# Patient Record
Sex: FEMALE | Race: BLACK OR AFRICAN AMERICAN | Employment: OTHER | URBAN - METROPOLITAN AREA
[De-identification: names, ages, dates, MRNs, and addresses within clinical notes are randomized per-mention and may not be internally consistent; named-entity substitution may affect disease eponyms.]

---

## 2017-04-21 ENCOUNTER — APPOINTMENT (OUTPATIENT)
Dept: GENERAL RADIOLOGY | Age: 82
DRG: 871 | End: 2017-04-21
Attending: STUDENT IN AN ORGANIZED HEALTH CARE EDUCATION/TRAINING PROGRAM
Payer: COMMERCIAL

## 2017-04-21 ENCOUNTER — HOSPITAL ENCOUNTER (INPATIENT)
Age: 82
LOS: 6 days | Discharge: NURSING, REHAB OR LONG-TERM CARE | DRG: 871 | End: 2017-04-28
Attending: STUDENT IN AN ORGANIZED HEALTH CARE EDUCATION/TRAINING PROGRAM | Admitting: INTERNAL MEDICINE
Payer: COMMERCIAL

## 2017-04-21 ENCOUNTER — APPOINTMENT (OUTPATIENT)
Dept: ULTRASOUND IMAGING | Age: 82
DRG: 871 | End: 2017-04-21
Attending: STUDENT IN AN ORGANIZED HEALTH CARE EDUCATION/TRAINING PROGRAM
Payer: COMMERCIAL

## 2017-04-21 ENCOUNTER — APPOINTMENT (OUTPATIENT)
Dept: CT IMAGING | Age: 82
DRG: 871 | End: 2017-04-21
Attending: STUDENT IN AN ORGANIZED HEALTH CARE EDUCATION/TRAINING PROGRAM
Payer: COMMERCIAL

## 2017-04-21 DIAGNOSIS — Z71.89 GOALS OF CARE, COUNSELING/DISCUSSION: ICD-10-CM

## 2017-04-21 DIAGNOSIS — R53.81 DEBILITY: ICD-10-CM

## 2017-04-21 DIAGNOSIS — R06.02 SHORTNESS OF BREATH: ICD-10-CM

## 2017-04-21 DIAGNOSIS — K81.0 ACUTE CHOLECYSTITIS: ICD-10-CM

## 2017-04-21 DIAGNOSIS — R41.0 ACUTE DELIRIUM: ICD-10-CM

## 2017-04-21 DIAGNOSIS — R40.4 TRANSIENT ALTERATION OF AWARENESS: Primary | ICD-10-CM

## 2017-04-21 LAB
ALBUMIN SERPL BCP-MCNC: 3.7 G/DL (ref 3.5–5)
ALBUMIN/GLOB SERPL: 0.9 {RATIO} (ref 1.1–2.2)
ALP SERPL-CCNC: 112 U/L (ref 45–117)
ALT SERPL-CCNC: 25 U/L (ref 12–78)
AMORPH CRY URNS QL MICRO: ABNORMAL
AMPHET UR QL SCN: NEGATIVE
ANION GAP BLD CALC-SCNC: 10 MMOL/L (ref 5–15)
APPEARANCE UR: ABNORMAL
AST SERPL W P-5'-P-CCNC: 23 U/L (ref 15–37)
BACTERIA URNS QL MICRO: NEGATIVE /HPF
BARBITURATES UR QL SCN: NEGATIVE
BASOPHILS # BLD AUTO: 0 K/UL (ref 0–0.1)
BASOPHILS # BLD: 0 % (ref 0–1)
BENZODIAZ UR QL: NEGATIVE
BILIRUB SERPL-MCNC: 0.7 MG/DL (ref 0.2–1)
BILIRUB UR QL CFM: NEGATIVE
BUN SERPL-MCNC: 28 MG/DL (ref 6–20)
BUN/CREAT SERPL: 17 (ref 12–20)
CALCIUM SERPL-MCNC: 9.4 MG/DL (ref 8.5–10.1)
CANNABINOIDS UR QL SCN: NEGATIVE
CHLORIDE SERPL-SCNC: 101 MMOL/L (ref 97–108)
CO2 SERPL-SCNC: 25 MMOL/L (ref 21–32)
COCAINE UR QL SCN: NEGATIVE
COLOR UR: ABNORMAL
CREAT SERPL-MCNC: 1.67 MG/DL (ref 0.55–1.02)
DRUG SCRN COMMENT,DRGCM: NORMAL
EOSINOPHIL # BLD: 0 K/UL (ref 0–0.4)
EOSINOPHIL NFR BLD: 0 % (ref 0–7)
EPITH CASTS URNS QL MICRO: ABNORMAL /LPF
ERYTHROCYTE [DISTWIDTH] IN BLOOD BY AUTOMATED COUNT: 15.5 % (ref 11.5–14.5)
ETHANOL SERPL-MCNC: <10 MG/DL
GLOBULIN SER CALC-MCNC: 4 G/DL (ref 2–4)
GLUCOSE SERPL-MCNC: 178 MG/DL (ref 65–100)
GLUCOSE UR STRIP.AUTO-MCNC: NEGATIVE MG/DL
GRAN CASTS URNS QL MICRO: ABNORMAL /LPF
HCT VFR BLD AUTO: 34.4 % (ref 35–47)
HGB BLD-MCNC: 10.8 G/DL (ref 11.5–16)
HGB UR QL STRIP: NEGATIVE
HYALINE CASTS URNS QL MICRO: ABNORMAL /LPF (ref 0–5)
KETONES UR QL STRIP.AUTO: ABNORMAL MG/DL
LACTATE SERPL-SCNC: 1.7 MMOL/L (ref 0.4–2)
LACTATE SERPL-SCNC: 2.3 MMOL/L (ref 0.4–2)
LEUKOCYTE ESTERASE UR QL STRIP.AUTO: NEGATIVE
LYMPHOCYTES # BLD AUTO: 6 % (ref 12–49)
LYMPHOCYTES # BLD: 1.2 K/UL (ref 0.8–3.5)
MCH RBC QN AUTO: 25.4 PG (ref 26–34)
MCHC RBC AUTO-ENTMCNC: 31.4 G/DL (ref 30–36.5)
MCV RBC AUTO: 80.8 FL (ref 80–99)
METHADONE UR QL: NEGATIVE
MONOCYTES # BLD: 1.3 K/UL (ref 0–1)
MONOCYTES NFR BLD AUTO: 7 % (ref 5–13)
NEUTS SEG # BLD: 16.5 K/UL (ref 1.8–8)
NEUTS SEG NFR BLD AUTO: 87 % (ref 32–75)
NITRITE UR QL STRIP.AUTO: NEGATIVE
OPIATES UR QL: NEGATIVE
PCP UR QL: NEGATIVE
PH UR STRIP: 5 [PH] (ref 5–8)
PLATELET # BLD AUTO: 352 K/UL (ref 150–400)
POTASSIUM SERPL-SCNC: 4.4 MMOL/L (ref 3.5–5.1)
PROT SERPL-MCNC: 7.7 G/DL (ref 6.4–8.2)
PROT UR STRIP-MCNC: 300 MG/DL
RBC # BLD AUTO: 4.26 M/UL (ref 3.8–5.2)
RBC #/AREA URNS HPF: ABNORMAL /HPF (ref 0–5)
SODIUM SERPL-SCNC: 136 MMOL/L (ref 136–145)
SP GR UR REFRACTOMETRY: 1.02 (ref 1–1.03)
TROPONIN I SERPL-MCNC: <0.04 NG/ML
UROBILINOGEN UR QL STRIP.AUTO: 0.2 EU/DL (ref 0.2–1)
WBC # BLD AUTO: 19 K/UL (ref 3.6–11)
WBC URNS QL MICRO: ABNORMAL /HPF (ref 0–4)

## 2017-04-21 PROCEDURE — 80307 DRUG TEST PRSMV CHEM ANLYZR: CPT | Performed by: STUDENT IN AN ORGANIZED HEALTH CARE EDUCATION/TRAINING PROGRAM

## 2017-04-21 PROCEDURE — 83605 ASSAY OF LACTIC ACID: CPT | Performed by: STUDENT IN AN ORGANIZED HEALTH CARE EDUCATION/TRAINING PROGRAM

## 2017-04-21 PROCEDURE — 96366 THER/PROPH/DIAG IV INF ADDON: CPT

## 2017-04-21 PROCEDURE — 77030005563 HC CATH URETH INT MMGH -A

## 2017-04-21 PROCEDURE — 70450 CT HEAD/BRAIN W/O DYE: CPT

## 2017-04-21 PROCEDURE — 74011000250 HC RX REV CODE- 250: Performed by: STUDENT IN AN ORGANIZED HEALTH CARE EDUCATION/TRAINING PROGRAM

## 2017-04-21 PROCEDURE — 51701 INSERT BLADDER CATHETER: CPT

## 2017-04-21 PROCEDURE — 71010 XR CHEST PORT: CPT

## 2017-04-21 PROCEDURE — 93005 ELECTROCARDIOGRAM TRACING: CPT

## 2017-04-21 PROCEDURE — 80053 COMPREHEN METABOLIC PANEL: CPT | Performed by: STUDENT IN AN ORGANIZED HEALTH CARE EDUCATION/TRAINING PROGRAM

## 2017-04-21 PROCEDURE — 74176 CT ABD & PELVIS W/O CONTRAST: CPT

## 2017-04-21 PROCEDURE — 74011250636 HC RX REV CODE- 250/636: Performed by: STUDENT IN AN ORGANIZED HEALTH CARE EDUCATION/TRAINING PROGRAM

## 2017-04-21 PROCEDURE — 96361 HYDRATE IV INFUSION ADD-ON: CPT

## 2017-04-21 PROCEDURE — 96365 THER/PROPH/DIAG IV INF INIT: CPT

## 2017-04-21 PROCEDURE — 84484 ASSAY OF TROPONIN QUANT: CPT | Performed by: STUDENT IN AN ORGANIZED HEALTH CARE EDUCATION/TRAINING PROGRAM

## 2017-04-21 PROCEDURE — 85025 COMPLETE CBC W/AUTO DIFF WBC: CPT | Performed by: STUDENT IN AN ORGANIZED HEALTH CARE EDUCATION/TRAINING PROGRAM

## 2017-04-21 PROCEDURE — 81001 URINALYSIS AUTO W/SCOPE: CPT | Performed by: STUDENT IN AN ORGANIZED HEALTH CARE EDUCATION/TRAINING PROGRAM

## 2017-04-21 PROCEDURE — 36415 COLL VENOUS BLD VENIPUNCTURE: CPT | Performed by: STUDENT IN AN ORGANIZED HEALTH CARE EDUCATION/TRAINING PROGRAM

## 2017-04-21 PROCEDURE — 99285 EMERGENCY DEPT VISIT HI MDM: CPT

## 2017-04-21 PROCEDURE — 74011250636 HC RX REV CODE- 250/636

## 2017-04-21 PROCEDURE — 87040 BLOOD CULTURE FOR BACTERIA: CPT | Performed by: STUDENT IN AN ORGANIZED HEALTH CARE EDUCATION/TRAINING PROGRAM

## 2017-04-21 PROCEDURE — 76705 ECHO EXAM OF ABDOMEN: CPT

## 2017-04-21 PROCEDURE — 94640 AIRWAY INHALATION TREATMENT: CPT

## 2017-04-21 RX ORDER — LEVOFLOXACIN 5 MG/ML
750 INJECTION, SOLUTION INTRAVENOUS
Status: COMPLETED | OUTPATIENT
Start: 2017-04-21 | End: 2017-04-21

## 2017-04-21 RX ORDER — POLYVINYL ALCOHOL 14 MG/ML
2 SOLUTION/ DROPS OPHTHALMIC
COMMUNITY

## 2017-04-21 RX ORDER — SIMETHICONE 80 MG
160 TABLET,CHEWABLE ORAL
COMMUNITY
End: 2019-09-28

## 2017-04-21 RX ORDER — IPRATROPIUM BROMIDE AND ALBUTEROL SULFATE 2.5; .5 MG/3ML; MG/3ML
3 SOLUTION RESPIRATORY (INHALATION)
COMMUNITY
End: 2017-04-21

## 2017-04-21 RX ORDER — LISINOPRIL 40 MG/1
40 TABLET ORAL DAILY
COMMUNITY
End: 2019-09-28

## 2017-04-21 RX ORDER — GABAPENTIN 300 MG/1
300 CAPSULE ORAL
COMMUNITY

## 2017-04-21 RX ORDER — METRONIDAZOLE 500 MG/100ML
500 INJECTION, SOLUTION INTRAVENOUS
Status: COMPLETED | OUTPATIENT
Start: 2017-04-21 | End: 2017-04-21

## 2017-04-21 RX ORDER — ALUMINA, MAGNESIA, AND SIMETHICONE 2400; 2400; 240 MG/30ML; MG/30ML; MG/30ML
5 SUSPENSION ORAL
COMMUNITY
End: 2017-04-21

## 2017-04-21 RX ORDER — POLYETHYLENE GLYCOL 3350 17 G/17G
17 POWDER, FOR SOLUTION ORAL DAILY
COMMUNITY

## 2017-04-21 RX ORDER — DICLOFENAC SODIUM 10 MG/G
2 GEL TOPICAL
COMMUNITY
End: 2019-09-28

## 2017-04-21 RX ORDER — GUAIFENESIN 100 MG/5ML
200 SOLUTION ORAL
COMMUNITY
End: 2019-09-28

## 2017-04-21 RX ORDER — DONEPEZIL HYDROCHLORIDE 5 MG/1
5 TABLET, FILM COATED ORAL
COMMUNITY
End: 2019-09-28

## 2017-04-21 RX ORDER — GABAPENTIN 100 MG/1
200 CAPSULE ORAL
COMMUNITY
End: 2017-04-21 | Stop reason: DRUGHIGH

## 2017-04-21 RX ORDER — HYDROCODONE BITARTRATE AND ACETAMINOPHEN 5; 325 MG/1; MG/1
1 TABLET ORAL
COMMUNITY
End: 2017-04-28

## 2017-04-21 RX ORDER — IPRATROPIUM BROMIDE AND ALBUTEROL SULFATE 2.5; .5 MG/3ML; MG/3ML
3 SOLUTION RESPIRATORY (INHALATION)
Status: COMPLETED | OUTPATIENT
Start: 2017-04-21 | End: 2017-04-21

## 2017-04-21 RX ORDER — ERGOCALCIFEROL 1.25 MG/1
100000 CAPSULE ORAL
COMMUNITY
End: 2019-09-28

## 2017-04-21 RX ORDER — ASPIRIN 325 MG
100000 TABLET, DELAYED RELEASE (ENTERIC COATED) ORAL
COMMUNITY
End: 2017-04-21 | Stop reason: SDUPTHER

## 2017-04-21 RX ORDER — NALOXONE HYDROCHLORIDE 1 MG/ML
INJECTION INTRAMUSCULAR; INTRAVENOUS; SUBCUTANEOUS
Status: COMPLETED
Start: 2017-04-21 | End: 2017-04-21

## 2017-04-21 RX ORDER — LANOLIN ALCOHOL/MO/W.PET/CERES
400 CREAM (GRAM) TOPICAL DAILY
COMMUNITY

## 2017-04-21 RX ORDER — ONDANSETRON 4 MG/1
4 TABLET, FILM COATED ORAL
COMMUNITY
End: 2019-09-28

## 2017-04-21 RX ADMIN — IPRATROPIUM BROMIDE AND ALBUTEROL SULFATE 3 ML: .5; 3 SOLUTION RESPIRATORY (INHALATION) at 19:35

## 2017-04-21 RX ADMIN — METRONIDAZOLE 500 MG: 500 INJECTION, SOLUTION INTRAVENOUS at 21:59

## 2017-04-21 RX ADMIN — NALOXONE HYDROCHLORIDE 2 MG: 1 INJECTION PARENTERAL at 17:03

## 2017-04-21 RX ADMIN — LEVOFLOXACIN 750 MG: 5 INJECTION, SOLUTION INTRAVENOUS at 20:33

## 2017-04-21 RX ADMIN — SODIUM CHLORIDE 1000 ML: 900 INJECTION, SOLUTION INTRAVENOUS at 17:14

## 2017-04-21 NOTE — PROGRESS NOTES
Admission Medication Reconciliation:    Information obtained from:  [facility] from Erica Kline with last administrations noted    Comments/Recommendations: Updated PTA meds/reviewed patient's allergies. 1)  Medications/Last dose per Shila COLUNGA. Patient also had medication list from [facility] where she goes during the day however [facility] list does not accurately reflect the medications patient is receiving at facility. Significant PMH/Disease States:   Past Medical History:   Diagnosis Date    Asthma     [facility] [address]     [facility] failure ([facility] [address] [facility]     Hypertension     Stroke [facility])      Chief Complaint for this Admission: AMS    Allergies:  Aspirin; Isoniazid; Pcn [penicillins]; and Sulfa (sulfonamide antibiotics)    Prior to Admission Medications:   Prior to Admission Medications   Prescriptions Last Dose Informant Patient Reported? Taking? HYDROcodone-acetaminophen (NORCO) 5-325 mg per tablet 4/15/2017  Yes Yes   Sig: Take 1 Tab by mouth every six (6) hours as needed for Pain. acetaminophen (TYLENOL) 500 mg tablet   Yes Yes   Sig: Take 500 mg by mouth every eight (8) hours as needed for Pain. Patient received afternoon dose at [facility]   amLODIPine (NORVASC) 2.5 mg tablet 4/20/2017 at HS  Yes Yes   Sig: Take 2.5 mg by mouth nightly. camphor-menthol (SARNA ANTI-ITCH) 0.5-0.5 % lotion   Yes Yes   Sig: Apply  to affected area four (4) times daily as needed (neuropathich pain). citalopram (CELEXA) 20 mg tablet 4/21/2017 at AM  Yes Yes   Sig: Take 20 mg by mouth daily. clopidogrel (PLAVIX) 75 mg tablet 4/21/2017 at AM Transfer Papers Yes Yes   Sig: Take 75 mg by mouth daily. diclofenac (VOLTAREN) 1 % gel   Yes Yes   Sig: Apply 2 g to affected area four (4) times daily as needed (Knee and shoulder pain).    donepezil (ARICEPT) 5 mg tablet 4/20/2017 at HS  Yes Yes   Sig: Take 5 mg by mouth nightly.   ergocalciferol (ERGOCALCIFEROL) 50,000 unit capsule 4/6/2017 at AM  Yes Yes   Sig: Take 100,000 Units by mouth every month. furosemide (LASIX) 20 mg tablet 4/21/2017 at AM  Yes Yes   Sig: Take 20 mg by mouth every Monday, Wednesday, Friday. gabapentin (NEURONTIN) 300 mg capsule 4/20/2017 at HS  Yes Yes   Sig: Take 300 mg by mouth nightly. guaiFENesin (ROBITUSSIN) 100 mg/5 mL liquid   Yes Yes   Sig: Take 200 mg by mouth every four (4) hours as needed for Cough. hydrALAZINE (APRESOLINE) 100 mg tablet 4/21/2017 at 1200  Yes Yes   Sig: Take 50 mg by mouth three (3) times daily. levothyroxine (SYNTHROID) 100 mcg tablet 4/21/2017 at ACB  Yes Yes   Sig: Take 100 mcg by mouth Daily (before breakfast). lisinopril (PRINIVIL, ZESTRIL) 40 mg tablet 4/21/2017 at AM  Yes Yes   Sig: Take 40 mg by mouth daily. magnesium oxide (MAG-OX) 400 mg tablet 4/21/2017 at AM  Yes Yes   Sig: Take 400 mg by mouth daily. ondansetron hcl (ZOFRAN, AS HYDROCHLORIDE,) 4 mg tablet 4/21/2017 at 0840  Yes Yes   Sig: Take 4 mg by mouth every eight (8) hours as needed for Nausea. polyethylene glycol (MIRALAX) 17 gram packet 4/21/2017 at AM  Yes Yes   Sig: Take 17 g by mouth daily. Mix in 8 oz of water, juice, soda, coffee, or tea prior to administration   polyvinyl alcohol (LIQUIFILM TEARS) 1.4 % ophthalmic solution 4/21/2017 at AM  Yes Yes   Sig: Administer 2 Drops to both eyes ACB/HS. simethicone (MYLICON) 80 mg chewable tablet   Yes Yes   Sig: Take 160 mg by mouth every eight (8) hours as needed for Flatulence.       Facility-Administered Medications: None

## 2017-04-21 NOTE — ED TRIAGE NOTES
Pt lives at [facility]. Pt was at adult  today and was noted to have increased AMS at 1500. . Previous stroke. Nonambulatory and uses raysa lift for mobility. Pt's eyes open to voice. Unaware of pt's baseline mentation.

## 2017-04-21 NOTE — ED PROVIDER NOTES
HPI Comments: 80 y.o. female with past medical history significant for heart failure, stroke, HTN, DM, and asthma who presents via EMS with chief complaint of altered mental status. EMS personnel report picking up the patient from the [facility] for a complaint of altered mental status with onset \"earlier today. \" They report the patient was noted to be altered while at adult day care \"earlier today. \" They report the patient's blood glucose level was ~175 en route to the ED. They report the patient has a history of stroke and dementia. They report the patient is non-ambulatory. They report the patient opens her eyes to verbal stimuli. They report not knowing the patient's baseline mentation. Patient's family report the patient is alert and able to eat well at her baseline; report the patient is not currently at her baseline. They report last seeing the patient ~4 days ago; report the patient was at her baseline mentation at that time. They deny the patient has fever, nausea, and vomiting. There are no other acute medical concerns at this time. Full history, physical exam, and ROS unable to be obtained due to: altered mental status. PCP: PROVIDER UNKNOWN    Note written by Mike Ball, as dictated by Mercedes Adame MD 4:00 PM     The history is provided by the EMS personnel. Past Medical History:   Diagnosis Date    Asthma     [facility] ([facility] [address]     Heart failure ([facility] [address]     Hypertension     Stroke [facility])        Past Surgical History:   Procedure Laterality Date    HX GYN      hysterectomy         No family history on file. Social History     Social History    Marital status:      Spouse name: N/A    Number of children: N/A    Years of education: N/A     Occupational History    Not on file.      Social History Main Topics    Smoking status: Never Smoker    Smokeless tobacco: Not on file    Alcohol use No    Drug use: No    Sexual activity: Not on file Other Topics Concern    Not on file     Social History Narrative    No narrative on file         ALLERGIES: Aspirin; Isoniazid; Pcn [penicillins]; and Sulfa (sulfonamide antibiotics)    Review of Systems   Unable to perform ROS: Mental status change       Vitals:    04/21/17 1546 04/21/17 1601 04/21/17 1630 04/21/17 1700   BP: 142/61 138/55 136/57 138/70   Pulse: 61 (!) 59 79 81   Resp: 18 21 19 22   Temp: 99.9 °F (37.7 °C)      SpO2: 96% 96% 95% 94%            Physical Exam   Constitutional: She appears well-developed and well-nourished. No distress. Lethargic. HENT:   Head: Normocephalic and atraumatic. Nose: Nose normal.   Mouth/Throat: Oropharynx is clear and moist. No oropharyngeal exudate. Eyes: Right eye exhibits no discharge. Left eye exhibits no discharge. No scleral icterus. Pinpoint pupils. Neck: Normal range of motion. Neck supple. No JVD present. No tracheal deviation present. No thyromegaly present. Cardiovascular: Normal rate, regular rhythm, normal heart sounds and intact distal pulses. Exam reveals no gallop and no friction rub. No murmur heard. Pulmonary/Chest: Effort normal and breath sounds normal. No stridor. No respiratory distress. She has no wheezes. She has no rales. She exhibits no tenderness. Abdominal: Bowel sounds are normal. She exhibits no distension and no mass. There is no tenderness. There is no rebound. Musculoskeletal: Normal range of motion. She exhibits no edema or tenderness. Lymphadenopathy:     She has no cervical adenopathy. Neurological: She is alert. No cranial nerve deficit. Coordination normal.   Responds to verbal stimuli. Skin: Skin is warm and dry. No rash noted. She is not diaphoretic. No erythema. No pallor. Psychiatric: She has a normal mood and affect.  Her behavior is normal. Judgment and thought content normal.   Note written by Mike Girard, as dictated by Naomie Hurley MD 4:12 PM      MDM  Number of Diagnoses or Management Options  Diagnosis management comments: Sepsis, AMS, CVA. 81 y/o female with unclear etiology of AMS. Will eval with cbc, cmp, blood, urine cultures, ecg, ct head, drug/etoh screen. CXR. Pt with leukocytosis, elevated lactate. Unclear source will obtain CT abd/pelvis. Concern for acute cholecystitis will order US. Consult surgery and hospitalist for admission. Amount and/or Complexity of Data Reviewed  Clinical lab tests: ordered and reviewed  Tests in the radiology section of CPT®: ordered and reviewed  Obtain history from someone other than the patient: yes  Review and summarize past medical records: yes  Discuss the patient with other providers: yes    Risk of Complications, Morbidity, and/or Mortality  Presenting problems: high  Diagnostic procedures: moderate  Management options: moderate    Critical Care  Total time providing critical care: 30-74 minutes (Total critical care time spend exclusive of procedures:  40 min)    Patient Progress  Patient progress: stable    ED Course       Procedures    PROGRESS NOTE:  6:50 PM  Radiologist called stating that the patient's CT scan shows concern for acute cholecystitis. Recommends abdominal ultrasound. CONSULT NOTE:  8:18 PM Jimmie Mosley MD spoke with Dr. Emelyn Erickson MD, Consult for Hospitalist. Discussed available diagnostic tests and clinical findings. Provider is in agreement with care plans as outlined. Dr. Emelyn Erickson MD recommends getting an ultrasound of the gallbladder. CONSULT NOTE:  9:55 PM Jimmie Mosley MD spoke with Dr. Emelyn Erickson MD, Consult for Hospitalist. Discussed available diagnostic tests and clinical findings. Provider is in agreement with care plans as outlined. Dr. Emelyn Erickson MD will see and admit the patient. CONSULT NOTE:  10:07 PM Jimmie Mosley MD spoke with Dr. Salvador Toney for General Surgery.  Discussed available diagnostic tests and clinical findings. Provider is in agreement with care plans as outlined. Dr. Valeriy Kramer will see the patient.

## 2017-04-21 NOTE — IP AVS SNAPSHOT
Summary of Care Report The Summary of Care report has been created to help improve care coordination. Users with access to [employer] or [address] (Web-based application) may access additional patient information including the Discharge Summary. If you are not currently a [address] user and need more information, please call the number listed below in the [place] [address] section and ask to be connected with Medical Records. Facility Information Name Address Phone [address] [facility] [address] [phone] [phone] Patient Information Patient Name Sex  Miranda Boyce ([id number]) Female 3/8/1933 Discharge Information Admitting Provider Service Area Unit Brandi Chen MD / [address] [facility] / [phone] Discharge Provider Discharge Date/Time Discharge Disposition Destination (none) 2017 (Pending) SNF (none) Patient Language Language ENGLISH [13] Hospital Problems as of 2017  Reviewed: 2017  3:36 PM by Yeison Kim NP Class Noted - Resolved Last Modified POA Active Problems * (Principal)Acute delirium  2017 - Present 2017 by Brandi Chen MD Yes Entered by Brandi Chen MD  
  Shortness of breath  2017 - Present 2017 by Yan Lemus MD Unknown Entered by Yan Lemus MD  
  Delirium  2017 - Present 2017 by Yan Lemus MD Unknown Entered by Yan Lemus MD  
  Debility  2017 - Present 2017 by Yan Lemus MD Unknown Entered by Yan Lemus MD  
  Goals of care, counseling/discussion  2017 - Present 2017 by Yan Lemus MD Unknown Entered by Yan Lemus MD  
  
Non-Hospital Problems as of 2017  Reviewed: 2017  3:36 PM by Yeison Kim NP None You are allergic to the following Allergen Reactions Latex Rash Aspirin Unknown (comments) Isoniazid Other (comments) Pcn (Penicillins) Rash  
    
 Sulfa (Sulfonamide Antibiotics) Rash Current Discharge Medication List  
  
CONTINUE these medications which have CHANGED Dose & Instructions Dispensing Information Comments  
 donepezil 5 mg tablet Commonly known as:  ARICEPT What changed:  Another medication with the same name was removed. Continue taking this medication, and follow the directions you see here. Dose:  5 mg Take 5 mg by mouth nightly. Refills:  0  
   
 gabapentin 300 mg capsule Commonly known as:  NEURONTIN What changed:  Another medication with the same name was removed. Continue taking this medication, and follow the directions you see here. Dose:  300 mg Take 300 mg by mouth nightly. Refills:  0 CONTINUE these medications which have NOT CHANGED Dose & Instructions Dispensing Information Comments  
 acetaminophen 500 mg tablet Commonly known as:  TYLENOL Dose:  500 mg Take 500 mg by mouth every eight (8) hours as needed for Pain. Patient received afternoon dose at [facility] Refills:  0  
   
 amLODIPine 2.5 mg tablet Commonly known as:  Karyle Rohrer Dose:  2.5 mg Take 2.5 mg by mouth nightly. Refills:  0  
   
 citalopram 20 mg tablet Commonly known as:  Mary Guess Dose:  20 mg Take 20 mg by mouth daily. Refills:  0  
   
 ergocalciferol 50,000 unit capsule Commonly known as:  ERGOCALCIFEROL Dose:  121593 Units Take 100,000 Units by mouth every month. Refills:  0  
   
 furosemide 20 mg tablet Commonly known as:  LASIX Dose:  20 mg Take 20 mg by mouth every Monday, Wednesday, Friday. Refills:  0  
   
 guaiFENesin 100 mg/5 mL liquid Commonly known as:  ROBITUSSIN Dose:  200 mg Take 200 mg by mouth every four (4) hours as needed for Cough. Refills:  0 hydrALAZINE 100 mg tablet Commonly known as:  APRESOLINE Dose:  50 mg Take 50 mg by mouth three (3) times daily. Refills:  0  
   
 levothyroxine 100 mcg tablet Commonly known as:  SYNTHROID Dose:  100 mcg Take 100 mcg by mouth Daily (before breakfast). Refills:  0  
   
 lisinopril 40 mg tablet Commonly known as:  Adam Apo Dose:  40 mg Take 40 mg by mouth daily. Refills:  0  
   
 magnesium oxide 400 mg tablet Commonly known as:  MAG-OX Dose:  400 mg Take 400 mg by mouth daily. Refills:  0 MIRALAX 17 gram packet Generic drug:  polyethylene glycol Dose:  17 g Take 17 g by mouth daily. Mix in 8 oz of water, juice, soda, coffee, or tea prior to administration Refills:  0 PLAVIX 75 mg Tab Generic drug:  clopidogrel Dose:  75 mg Take 75 mg by mouth daily. Refills:  0  
   
 polyvinyl alcohol 1.4 % ophthalmic solution Commonly known as:  Hurshel Leann Dose:  2 Drop Administer 2 Drops to both eyes ACB/HS. Refills:  0 SARNA ANTI-ITCH 0.5-0.5 % lotion Generic drug:  camphor-menthol Apply  to affected area four (4) times daily as needed (neuropathich pain). Refills:  0  
   
 simethicone 80 mg chewable tablet Commonly known as:  Vickey Peers Dose:  160 mg Take 160 mg by mouth every eight (8) hours as needed for Flatulence. Refills:  0 VOLTAREN 1 % Gel Generic drug:  diclofenac Dose:  2 g Apply 2 g to affected area four (4) times daily as needed (Knee and shoulder pain). Refills:  0 ZOFRAN (AS HYDROCHLORIDE) 4 mg tablet Generic drug:  ondansetron hcl Dose:  4 mg Take 4 mg by mouth every eight (8) hours as needed for Nausea. Refills:  0 STOP taking these medications Wilman DECARA 50,000 unit Cap Generic drug:  Cholecalciferol (Vitamin D3) HYDROcodone-acetaminophen 5-325 mg per tablet Commonly known as:  Ayah Alonzo  
   
   
 VITAMIN D3 2,000 unit Tab Generic drug:  cholecalciferol (vitamin D3) Current Immunizations Name Date Influenza Vaccine 10/30/2014 Pneumococcal Vaccine (Unspecified Type) 2/28/2013 Surgery Information ID Date/Time Status Primary Surgeon All Procedures Location [id number] 4/26/2017 Shania John MD CHOLECYSTECTOMY LAPAROSCOPIC POSSIBLE OPEN  [facility] MAIN OR Follow-up Information Follow up With Details Comments Contact Info Provider Unknown   Patient not available to ask [address] [facility] [address] [facility] [address] 
[phone] Discharge Instructions Discharge SNF/Rehab Instructions/LTAC  
 
 
PATIENT ID: Will Bender MRN: [id number] YOB: 1933 DATE OF ADMISSION: 4/21/2017  3:29 PM   
DATE OF DISCHARGE: 4/28/2017 PRIMARY CARE PROVIDER: PROVIDER UNKNOWN  
 
 
DISCHARGING PHYSICIAN: Danya Schwab, NP To contact this individual call [phone] and ask the  to page. If unavailable ask to be transferred the Adult Hospitalist Department. CONSULTATIONS: IP CONSULT TO HOSPITALIST 
IP CONSULT TO GENERAL SURGERY 
IP CONSULT TO CARDIOLOGY 
IP CONSULT TO INTERVENTIONAL RADIOLOGY 
IP CONSULT TO PALLIATIVE CARE - PROVIDER PROCEDURES/SURGERIES: Procedure(s): CHOLECYSTECTOMY LAPAROSCOPIC POSSIBLE OPEN  
 
ADMITTING DIAGNOSES & HOSPITAL COURSE:  
 
80year old female with history of prior CVA with residual right-sided weakness, HTN, DM-2, dementia, chronic diastolic heart failure, hypothyroidism presented on 4/22/17 with altered mental status and poorly characterized right upper quadrant abdominal pain. She was noted to have findings concerning for acute cholecystitis on CT Abd/Pelvis which was also confirmed via hida scan.  She was offered a surgical removal of gallbladder however family wished for more conservative treatment with fluids and IV antibiotics. Despite five days of IV antibiotics and aggressive medical treatments Ms. Adelso Andre continues to not improve medically ( fever, increasing WBC, lethargy and confusion). There was a palliative care meeting today to discuss goals of care and the following was decided : 
 
 
Note from Palliative care today :  
 
We discussed the option of continuing antibiotics for a few more days vs stopping everything including TPN and focus on comfort alone. She can also return to a PACE facility and receive comfort care under their care. Family agreeable to this option and want comfort care. 
  
2. Comfort care orders initiated. 3. TPN and antibiotics stopped. 4. [facility]ACE  will co ordinate discharge with primary team and  5. Initial consult note routed to primary continuity provider 6. Communicated plan of care with: Palliative IDT, Dr. Dereje Mcnamara DISCHARGE DIAGNOSES / PLAN DURING ADMISSION:   
 
1. Delirium, acute metabolic encephalopathy 
-has hit a plateau at this point , no further improvement seen  
- suspect secondary to sepsis in setting of possible acute cholecystitis - CT head 4/21 - Chronic microvascular ischemic change. No acute intracranial process. - MRI brain 4/22 - No evidence for acute infarction. Mild to moderate bilateral subcortical and periventricular increased T2/FLAIR signal abnormalities are nonspecific but may represent changes of chronic small 
vessel ischemic disease. 
  
2. Suspected sepsis secondary to acute cholecystitis - with tachycardia, leukocytosis upon admission ( white count is rising again) - CT Abd/Pelvis 4/21 - Findings are suggestive of acute cholecystitis. - Abd U/S 4/21 - Distended gallbladder with gallstones; no biliary ductal dilatation. No right hydronephrosis. - blood cultures 4/21 - gram positive cocci in clusters in 1/2 bottles drawn - coagulase negative Staphylococcus - on empiric levofloxacin, Flagyl, added vancomycin due to GPCs in clusters as above, however, this is likely contaminant , Vanc dc'd 
- NGTD on repeat cultures 4/26 
- HIDA on 4/24 with cystic duct obstruction and possible acute cholecystitis - Attempted to place cholecystostomy tube on 4/24 and unable to do so. Family declines surgery. 
  
3. Possible STACEY 
-resolved 
- unclear baseline Cr, possibly some underlying CKD, Cr elevated to 1.80 upon admission, suspect prerenal azotemia and sepsis contributing, seemingly improving 4. Prior CVA  
- holding home Plavix in setting of possible need for intervention  
  
5. Chronic diastolic heart failure 
- does not currently appear in acute exacerbation of this diagnosis 
- unclear baseline NYHA classification, unable to determine at this juncture given her mentation 
- on hydralazine 
  
6. Dementia 
- continue home Aricept, Celexa 
  
7. DM-2 
- sliding scale insulin coverage increased to resistant, stable 
- continue home gabapentin 
  
8. HTN 
- home amlodipine, hydralazine 
-increased Amlodipine dose on 4/26- BP minimally improved on 4/27  
- monitor  
  
9. Hypothyroidism 
- resume home levothyroxine 
  
10. Wheezing- improved - predominantly upper airways, suspect some slight reactive airways - prn DuoNebs 
  
11. Morbid Obesity - BMI 37.96 
  
12 Hypernatremia - TPN started per general surgery - stopped today per comfort care measures 
- accu checks AC & HS with sliding scale insulin ordered 
-monitor 
  
  
13. Malnutrition in the setting of dysphagia 
-stop TPN today t 
-??? Dietary put in reccs for dysphagia II pureed diet PENDING TEST RESULTS:  
At the time of discharge the following test results are still pending: none FOLLOW UP APPOINTMENTS:   
Follow-up Information Follow up With Details Comments Contact Info Provider Unknown   Patient not available to ask ADDITIONAL CARE RECOMMENDATIONS: per [facility] clinic DIET: Dysphagia diet-pureed- Dysphagia II and Comfort feedings OXYGEN / BiPAP SETTINGS: Supplemental oxygen for comfort to keep saturations > 90% ACTIVITY: Activity as tolerated DISCHARGE MEDICATIONS: 
 See Medication Reconciliation Form NOTIFY YOUR PHYSICIAN FOR ANY OF THE FOLLOWING:  
Fever over 101 degrees for 24 hours. Chest pain, shortness of breath, fever, chills, nausea, vomiting, diarrhea, change in mentation, falling, weakness, bleeding. Severe pain or pain not relieved by medications. Or, any other signs or symptoms that you may have questions about. DISPOSITION: 
  Home With: 
 OT  PT  HH  RN  
  
X SNF/Inpatient Rehab/LTAC Independent/assisted living Hospice Other:  
 
 
PATIENT CONDITION AT DISCHARGE:  
 
Functional status X Poor Deconditioned Independent Cognition Ilean Mings Forgetful X Dementia Catheters/lines (plus indication) Lua PICC   
 PEG   
X None Code status Full code X DNR   
 
 
 
CHRONIC MEDICAL DIAGNOSES: 
Problem List as of 4/28/2017  Date Reviewed: 4/28/2017 Codes Class Noted - Resolved Shortness of breath ICD-10-CM: R06.02 
ICD-9-CM: 786.05  4/27/2017 - Present Delirium ICD-10-CM: R41.0 ICD-9-CM: 780.09  4/27/2017 - Present Debility ICD-10-CM: R53.81 ICD-9-CM: 799.3  4/27/2017 - Present Goals of care, counseling/discussion ICD-10-CM: Z71.89 ICD-9-CM: V65.49  4/27/2017 - Present * (Principal)Acute delirium ICD-10-CM: R41.0 ICD-9-CM: 780.09  4/22/2017 - Present CDMP Checked: Yes X Signed:  
Abiodun Vance NP 
4/28/2017 
3:37 PM 
 
  
 
Chart Review Routing History Recipient Method Report Sent By Linda Blanton Provider Lakia, MD [address] Mail IP Auto Routed Notes Yaakov Granda MD [[id number]] 1/27/2015  1:04 PM 01/27/2015 Provider Unknown, MD  
[address] Mail IP Auto Routed Notes Judge Peter MD [[id number]] 1/29/2015 10:34 AM 01/29/2015 Provider Unknown, MD  
Patient not available to ask [address] Mail IP Auto Routed Notes Naseem Tobias MD [[id number]] 4/22/2017 12:24 AM 04/22/2017 Provider Unknown, MD  
Patient not available to ask [address] Mail IP Auto Routed Ezequiel Tabor MD [[id number]] 4/22/2017  6:19 AM 04/22/2017

## 2017-04-22 ENCOUNTER — APPOINTMENT (OUTPATIENT)
Dept: MRI IMAGING | Age: 82
DRG: 871 | End: 2017-04-22
Attending: INTERNAL MEDICINE
Payer: COMMERCIAL

## 2017-04-22 PROBLEM — R41.0 ACUTE DELIRIUM: Status: ACTIVE | Noted: 2017-04-22

## 2017-04-22 LAB
ALBUMIN SERPL BCP-MCNC: 3.2 G/DL (ref 3.5–5)
ALBUMIN/GLOB SERPL: 0.8 {RATIO} (ref 1.1–2.2)
ALP SERPL-CCNC: 90 U/L (ref 45–117)
ALT SERPL-CCNC: 22 U/L (ref 12–78)
AMMONIA PLAS-SCNC: 15 UMOL/L
AMYLASE SERPL-CCNC: 73 U/L (ref 25–115)
ANION GAP BLD CALC-SCNC: 8 MMOL/L (ref 5–15)
AST SERPL W P-5'-P-CCNC: 17 U/L (ref 15–37)
ATRIAL RATE: 69 BPM
BILIRUB SERPL-MCNC: 0.6 MG/DL (ref 0.2–1)
BUN SERPL-MCNC: 32 MG/DL (ref 6–20)
BUN/CREAT SERPL: 18 (ref 12–20)
CALCIUM SERPL-MCNC: 9.1 MG/DL (ref 8.5–10.1)
CALCULATED R AXIS, ECG10: 5 DEGREES
CALCULATED T AXIS, ECG11: 50 DEGREES
CHLORIDE SERPL-SCNC: 105 MMOL/L (ref 97–108)
CK MB CFR SERPL CALC: NORMAL % (ref 0–2.5)
CK MB SERPL-MCNC: <1 NG/ML (ref 5–25)
CK SERPL-CCNC: 72 U/L (ref 26–192)
CO2 SERPL-SCNC: 26 MMOL/L (ref 21–32)
CREAT SERPL-MCNC: 1.8 MG/DL (ref 0.55–1.02)
DIAGNOSIS, 93000: NORMAL
ERYTHROCYTE [DISTWIDTH] IN BLOOD BY AUTOMATED COUNT: 15.5 % (ref 11.5–14.5)
EST. AVERAGE GLUCOSE BLD GHB EST-MCNC: 117 MG/DL
GLOBULIN SER CALC-MCNC: 4 G/DL (ref 2–4)
GLUCOSE BLD STRIP.AUTO-MCNC: 131 MG/DL (ref 65–100)
GLUCOSE BLD STRIP.AUTO-MCNC: 139 MG/DL (ref 65–100)
GLUCOSE BLD STRIP.AUTO-MCNC: 146 MG/DL (ref 65–100)
GLUCOSE SERPL-MCNC: 145 MG/DL (ref 65–100)
HBA1C MFR BLD: 5.7 % (ref 4.2–6.3)
HCT VFR BLD AUTO: 31 % (ref 35–47)
HGB BLD-MCNC: 9.8 G/DL (ref 11.5–16)
LIPASE SERPL-CCNC: 38 U/L (ref 73–393)
MAGNESIUM SERPL-MCNC: 1.9 MG/DL (ref 1.6–2.4)
MCH RBC QN AUTO: 25.5 PG (ref 26–34)
MCHC RBC AUTO-ENTMCNC: 31.6 G/DL (ref 30–36.5)
MCV RBC AUTO: 80.5 FL (ref 80–99)
PHOSPHATE SERPL-MCNC: 3 MG/DL (ref 2.6–4.7)
PLATELET # BLD AUTO: 285 K/UL (ref 150–400)
POTASSIUM SERPL-SCNC: 4.5 MMOL/L (ref 3.5–5.1)
PROT SERPL-MCNC: 7.2 G/DL (ref 6.4–8.2)
Q-T INTERVAL, ECG07: 448 MS
QRS DURATION, ECG06: 82 MS
QTC CALCULATION (BEZET), ECG08: 458 MS
RBC # BLD AUTO: 3.85 M/UL (ref 3.8–5.2)
SERVICE CMNT-IMP: ABNORMAL
SODIUM SERPL-SCNC: 139 MMOL/L (ref 136–145)
TROPONIN I SERPL-MCNC: <0.04 NG/ML
TSH SERPL DL<=0.05 MIU/L-ACNC: 1.96 UIU/ML (ref 0.36–3.74)
VENTRICULAR RATE, ECG03: 63 BPM
WBC # BLD AUTO: 18 K/UL (ref 3.6–11)

## 2017-04-22 PROCEDURE — 84443 ASSAY THYROID STIM HORMONE: CPT | Performed by: INTERNAL MEDICINE

## 2017-04-22 PROCEDURE — 83690 ASSAY OF LIPASE: CPT | Performed by: INTERNAL MEDICINE

## 2017-04-22 PROCEDURE — 74011250636 HC RX REV CODE- 250/636: Performed by: INTERNAL MEDICINE

## 2017-04-22 PROCEDURE — 85027 COMPLETE CBC AUTOMATED: CPT | Performed by: SURGERY

## 2017-04-22 PROCEDURE — 82962 GLUCOSE BLOOD TEST: CPT

## 2017-04-22 PROCEDURE — 84484 ASSAY OF TROPONIN QUANT: CPT | Performed by: INTERNAL MEDICINE

## 2017-04-22 PROCEDURE — 70551 MRI BRAIN STEM W/O DYE: CPT

## 2017-04-22 PROCEDURE — 74011000250 HC RX REV CODE- 250: Performed by: INTERNAL MEDICINE

## 2017-04-22 PROCEDURE — 83036 HEMOGLOBIN GLYCOSYLATED A1C: CPT | Performed by: INTERNAL MEDICINE

## 2017-04-22 PROCEDURE — 65660000000 HC RM CCU STEPDOWN

## 2017-04-22 PROCEDURE — 82140 ASSAY OF AMMONIA: CPT | Performed by: INTERNAL MEDICINE

## 2017-04-22 PROCEDURE — 77030029684 HC NEB SM VOL KT MONA -A

## 2017-04-22 PROCEDURE — 82550 ASSAY OF CK (CPK): CPT | Performed by: INTERNAL MEDICINE

## 2017-04-22 PROCEDURE — 93970 EXTREMITY STUDY: CPT

## 2017-04-22 PROCEDURE — 83735 ASSAY OF MAGNESIUM: CPT | Performed by: INTERNAL MEDICINE

## 2017-04-22 PROCEDURE — 80053 COMPREHEN METABOLIC PANEL: CPT | Performed by: SURGERY

## 2017-04-22 PROCEDURE — 84100 ASSAY OF PHOSPHORUS: CPT | Performed by: INTERNAL MEDICINE

## 2017-04-22 PROCEDURE — 93306 TTE W/DOPPLER COMPLETE: CPT

## 2017-04-22 PROCEDURE — 36415 COLL VENOUS BLD VENIPUNCTURE: CPT | Performed by: INTERNAL MEDICINE

## 2017-04-22 PROCEDURE — 82150 ASSAY OF AMYLASE: CPT | Performed by: INTERNAL MEDICINE

## 2017-04-22 PROCEDURE — 74011250636 HC RX REV CODE- 250/636: Performed by: STUDENT IN AN ORGANIZED HEALTH CARE EDUCATION/TRAINING PROGRAM

## 2017-04-22 RX ORDER — VANCOMYCIN 2 GRAM/500 ML IN 0.9 % SODIUM CHLORIDE INTRAVENOUS
2000 ONCE
Status: COMPLETED | OUTPATIENT
Start: 2017-04-22 | End: 2017-04-22

## 2017-04-22 RX ORDER — HEPARIN SODIUM 5000 [USP'U]/ML
5000 INJECTION, SOLUTION INTRAVENOUS; SUBCUTANEOUS EVERY 8 HOURS
Status: DISCONTINUED | OUTPATIENT
Start: 2017-04-22 | End: 2017-04-28

## 2017-04-22 RX ORDER — LEVOTHYROXINE SODIUM 100 UG/1
100 TABLET ORAL
Status: DISCONTINUED | OUTPATIENT
Start: 2017-04-22 | End: 2017-04-28 | Stop reason: HOSPADM

## 2017-04-22 RX ORDER — HYDRALAZINE HYDROCHLORIDE 50 MG/1
50 TABLET, FILM COATED ORAL 3 TIMES DAILY
Status: DISCONTINUED | OUTPATIENT
Start: 2017-04-22 | End: 2017-04-28 | Stop reason: HOSPADM

## 2017-04-22 RX ORDER — ERGOCALCIFEROL 1.25 MG/1
100000 CAPSULE ORAL
Status: DISCONTINUED | OUTPATIENT
Start: 2017-04-22 | End: 2017-04-28

## 2017-04-22 RX ORDER — MAGNESIUM SULFATE 100 %
4 CRYSTALS MISCELLANEOUS AS NEEDED
Status: DISCONTINUED | OUTPATIENT
Start: 2017-04-22 | End: 2017-04-28 | Stop reason: HOSPADM

## 2017-04-22 RX ORDER — LANOLIN ALCOHOL/MO/W.PET/CERES
400 CREAM (GRAM) TOPICAL DAILY
Status: DISCONTINUED | OUTPATIENT
Start: 2017-04-22 | End: 2017-04-28 | Stop reason: HOSPADM

## 2017-04-22 RX ORDER — IPRATROPIUM BROMIDE AND ALBUTEROL SULFATE 2.5; .5 MG/3ML; MG/3ML
3 SOLUTION RESPIRATORY (INHALATION)
Status: DISCONTINUED | OUTPATIENT
Start: 2017-04-22 | End: 2017-04-28 | Stop reason: HOSPADM

## 2017-04-22 RX ORDER — CITALOPRAM 20 MG/1
20 TABLET, FILM COATED ORAL DAILY
Status: DISCONTINUED | OUTPATIENT
Start: 2017-04-22 | End: 2017-04-28 | Stop reason: HOSPADM

## 2017-04-22 RX ORDER — ONDANSETRON 2 MG/ML
4 INJECTION INTRAMUSCULAR; INTRAVENOUS
Status: DISCONTINUED | OUTPATIENT
Start: 2017-04-22 | End: 2017-04-28 | Stop reason: HOSPADM

## 2017-04-22 RX ORDER — ACETAMINOPHEN 500 MG
500 TABLET ORAL
Status: DISCONTINUED | OUTPATIENT
Start: 2017-04-22 | End: 2017-04-28 | Stop reason: HOSPADM

## 2017-04-22 RX ORDER — DONEPEZIL HYDROCHLORIDE 5 MG/1
5 TABLET, FILM COATED ORAL
Status: DISCONTINUED | OUTPATIENT
Start: 2017-04-22 | End: 2017-04-28

## 2017-04-22 RX ORDER — AMLODIPINE BESYLATE 5 MG/1
2.5 TABLET ORAL
Status: DISCONTINUED | OUTPATIENT
Start: 2017-04-22 | End: 2017-04-26

## 2017-04-22 RX ORDER — GUAIFENESIN 100 MG/5ML
200 SOLUTION ORAL
Status: DISCONTINUED | OUTPATIENT
Start: 2017-04-22 | End: 2017-04-28 | Stop reason: HOSPADM

## 2017-04-22 RX ORDER — POLYETHYLENE GLYCOL 3350 17 G/17G
17 POWDER, FOR SOLUTION ORAL DAILY
Status: DISCONTINUED | OUTPATIENT
Start: 2017-04-22 | End: 2017-04-28 | Stop reason: HOSPADM

## 2017-04-22 RX ORDER — LEVOFLOXACIN 5 MG/ML
250 INJECTION, SOLUTION INTRAVENOUS EVERY 24 HOURS
Status: DISCONTINUED | OUTPATIENT
Start: 2017-04-22 | End: 2017-04-26

## 2017-04-22 RX ORDER — ACETAMINOPHEN 500 MG
TABLET ORAL
Status: DISPENSED
Start: 2017-04-22 | End: 2017-04-22

## 2017-04-22 RX ORDER — HYDROCODONE BITARTRATE AND ACETAMINOPHEN 5; 325 MG/1; MG/1
1 TABLET ORAL
Status: DISCONTINUED | OUTPATIENT
Start: 2017-04-22 | End: 2017-04-28 | Stop reason: HOSPADM

## 2017-04-22 RX ORDER — METRONIDAZOLE 500 MG/100ML
500 INJECTION, SOLUTION INTRAVENOUS EVERY 8 HOURS
Status: DISCONTINUED | OUTPATIENT
Start: 2017-04-22 | End: 2017-04-28

## 2017-04-22 RX ORDER — DEXTROSE 50 % IN WATER (D50W) INTRAVENOUS SYRINGE
12.5-25 AS NEEDED
Status: DISCONTINUED | OUTPATIENT
Start: 2017-04-22 | End: 2017-04-28 | Stop reason: HOSPADM

## 2017-04-22 RX ORDER — ACETAMINOPHEN 650 MG/1
650 SUPPOSITORY RECTAL
Status: DISCONTINUED | OUTPATIENT
Start: 2017-04-22 | End: 2017-04-28 | Stop reason: HOSPADM

## 2017-04-22 RX ORDER — INSULIN LISPRO 100 [IU]/ML
INJECTION, SOLUTION INTRAVENOUS; SUBCUTANEOUS
Status: DISCONTINUED | OUTPATIENT
Start: 2017-04-22 | End: 2017-04-22

## 2017-04-22 RX ORDER — LEVOFLOXACIN 5 MG/ML
500 INJECTION, SOLUTION INTRAVENOUS
Status: DISCONTINUED | OUTPATIENT
Start: 2017-04-23 | End: 2017-04-22

## 2017-04-22 RX ORDER — HYDROMORPHONE HYDROCHLORIDE 1 MG/ML
0.5 INJECTION, SOLUTION INTRAMUSCULAR; INTRAVENOUS; SUBCUTANEOUS
Status: DISCONTINUED | OUTPATIENT
Start: 2017-04-22 | End: 2017-04-28 | Stop reason: HOSPADM

## 2017-04-22 RX ORDER — GABAPENTIN 300 MG/1
300 CAPSULE ORAL
Status: DISCONTINUED | OUTPATIENT
Start: 2017-04-22 | End: 2017-04-28 | Stop reason: HOSPADM

## 2017-04-22 RX ORDER — SIMETHICONE 80 MG
160 TABLET,CHEWABLE ORAL
Status: DISCONTINUED | OUTPATIENT
Start: 2017-04-22 | End: 2017-04-28 | Stop reason: HOSPADM

## 2017-04-22 RX ORDER — INSULIN LISPRO 100 [IU]/ML
INJECTION, SOLUTION INTRAVENOUS; SUBCUTANEOUS EVERY 6 HOURS
Status: DISCONTINUED | OUTPATIENT
Start: 2017-04-22 | End: 2017-04-25

## 2017-04-22 RX ORDER — SODIUM CHLORIDE 9 MG/ML
75 INJECTION, SOLUTION INTRAVENOUS CONTINUOUS
Status: DISCONTINUED | OUTPATIENT
Start: 2017-04-22 | End: 2017-04-25

## 2017-04-22 RX ADMIN — HEPARIN SODIUM 5000 UNITS: 5000 INJECTION, SOLUTION INTRAVENOUS; SUBCUTANEOUS at 20:38

## 2017-04-22 RX ADMIN — LEVOFLOXACIN 250 MG: 5 INJECTION, SOLUTION INTRAVENOUS at 21:42

## 2017-04-22 RX ADMIN — SODIUM CHLORIDE 75 ML/HR: 900 INJECTION, SOLUTION INTRAVENOUS at 04:00

## 2017-04-22 RX ADMIN — METRONIDAZOLE 500 MG: 500 INJECTION, SOLUTION INTRAVENOUS at 20:38

## 2017-04-22 RX ADMIN — VANCOMYCIN HYDROCHLORIDE 2000 MG: 10 INJECTION, POWDER, LYOPHILIZED, FOR SOLUTION INTRAVENOUS at 15:06

## 2017-04-22 RX ADMIN — METRONIDAZOLE 500 MG: 500 INJECTION, SOLUTION INTRAVENOUS at 12:32

## 2017-04-22 RX ADMIN — METRONIDAZOLE 500 MG: 500 INJECTION, SOLUTION INTRAVENOUS at 05:09

## 2017-04-22 RX ADMIN — HEPARIN SODIUM 5000 UNITS: 5000 INJECTION, SOLUTION INTRAVENOUS; SUBCUTANEOUS at 12:34

## 2017-04-22 RX ADMIN — HEPARIN SODIUM 5000 UNITS: 5000 INJECTION, SOLUTION INTRAVENOUS; SUBCUTANEOUS at 05:09

## 2017-04-22 NOTE — PROGRESS NOTES
Pt's granddaughter called requesting information and for the dr to call her. RN returned call to pt's son Narcisa Kinsey, whose contact information is listed on pt's demographics sheet. Informed son of inability to release information to anyone not listed on demographics sheet due to HIPAA laws and son Narcisa Kinsey verbalized understanding. Status update given. Son stated he missed a call from the dr earlier today and requests a return call from doctor around noon tomorrow on his cell phone: [phone].

## 2017-04-22 NOTE — PROGRESS NOTES
Bedside shift change report given to Jareth Naranjo (oncoming nurse) by Usha Galeana RN (offgoing nurse). Report included the following information SBAR, Kardex, Intake/Output, MAR, Recent Results and Cardiac Rhythm SR with 1AVB 80s.

## 2017-04-22 NOTE — PROGRESS NOTES
Hospitalist Progress Note  Vasyl Sullivan MD  Office: [phone]  Cell: [phone]      Date of Service:  2017  NAME:  Martha Yanez  :  3/8/1933  MRN:  [id number]      Admission Summary:    80year old female with history of prior CVA with residual right-sided weakness, HTN, DM-2, dementia, chronic diastolic heart failure, hypothyroidism presented on 17 with altered mental status and poorly characterized right upper quadrant abdominal pain. She was noted to have findings concerning for acute cholecystitis on CT Abd/Pelvis. Interval history / Subjective:    She is mainly able mumble words to me. Denies any pain, does not endorse any other complaints. Unable to assess orientation with patient. Assessment & Plan:     1. Delirium, acute metabolic encephalopathy   - suspect secondary to sepsis in setting of possible acute cholecystitis   - CT head  - Chronic microvascular ischemic change. No acute intracranial process. - MRI brain  - No evidence for acute infarction. Mild to moderate bilateral subcortical and periventricular increased T2/FLAIR signal abnormalities are nonspecific but may represent changes of chronic small  vessel ischemic disease.   - treating underlying conditions as below    2. Suspected sepsis secondary to acute cholecystitis   - with tachycardia, leukocytosis upon admission   - CT Abd/Pelvis  - Findings are suggestive of acute cholecystitis. - Abd U/S  - Distended gallbladder with gallstones; no biliary ductal dilatation. No right hydronephrosis. - blood cultures  - gram positive cocci in clusters in 1/2 bottles drawn   - on empiric levofloxacin, Flagyl, added vancomycin   - await HIDA scan    3. Possible STACEY   - unclear baseline Cr, possibly some underlying CKD, Cr elevated to 1.80 upon admission, suspect prerenal azotemia and sepsis contributing   - IV fluid hydration    4. Prior CVA    - holding home Plavix in setting of possible need for intervention     5. Chronic diastolic heart failure   - does not currently appear in acute exacerbation of this diagnosis   - unclear baseline NYHA classification, unable to determine at this juncture given her mentation   - on hydralazine    6. Dementia   - continue home Aricept, Celexa    7. DM-2   - sliding scale insulin coverage   - continue home gabapentin    8. HTN   - resume home amlodipine, hydralazine    9. Hypothyroidism   - resume home levothyroxine    Code status: DNR  DVT prophylaxis: heparin    Care Plan discussed with: Patient/Family and Nurse  Disposition: TBD     Hospital Problems  Date Reviewed: 4/22/2017          Codes Class Noted POA    * (Principal)Acute delirium ICD-10-CM: R41.0  ICD-9-CM: 780.09  4/22/2017 Yes                Review of Systems:   Review of systems not obtained due to patient factors. Vital Signs:    Last 24hrs VS reviewed since prior progress note. Most recent are:  Visit Vitals    /56 (BP 1 Location: Right arm, BP Patient Position: At rest)    Pulse 85    Temp 99.8 °F (37.7 °C)    Resp 22    Ht 5' 3\" (1.6 m)    Wt 99 kg (218 lb 4.8 oz)    SpO2 94%    Breastfeeding No    BMI 38.67 kg/m2       No intake or output data in the 24 hours ending 04/22/17 0732     Physical Examination:             Constitutional:  No acute distress, cooperative, mumbling words   ENT:  Oral mucous slightly dry, oropharynx benign. Neck supple    Resp:  CTA bilaterally. No wheezing/rhonchi/rales. No accessory muscle use   CV:  Regular rhythm, normal rate, no murmurs, gallops, rubs    GI:  Soft, non distended, grimaces slightly on palpation of RUQ though no guarding. normoactive bowel sounds, no hepatosplenomegaly     Musculoskeletal:  No edema, warm, 2+ pulses throughout    Neurologic:  Moves all extremities.   AAOx1 (self only), otherwise, seemingly non-focal examination            Data Review:    Review and/or order of clinical lab test      Labs:     Recent Labs      04/22/17   0336  04/21/17   1557   WBC  18.0*  19.0*   HGB  9.8*  10.8*   HCT  31.0*  34.4*   PLT  285  352     Recent Labs      04/22/17   0336  04/21/17   1557   NA  139  136   K  4.5  4.4   CL  105  101   CO2  26  25   BUN  32*  28*   CREA  1.80*  1.67*   GLU  145*  178*   CA  9.1  9.4   MG  1.9   --    PHOS  3.0   --      Recent Labs      04/22/17   0336  04/21/17   1557   SGOT  17  23   ALT  22  25   AP  90  112   TBILI  0.6  0.7   TP  7.2  7.7   ALB  3.2*  3.7   GLOB  4.0  4.0   AML  73   --    LPSE  38*   --      No results for input(s): INR, PTP, APTT in the last 72 hours. No lab exists for component: INREXT   No results for input(s): FE, TIBC, PSAT, FERR in the last 72 hours. No results found for: FOL, RBCF   No results for input(s): PH, PCO2, PO2 in the last 72 hours.   Recent Labs      04/22/17 0336 04/21/17   1557   CPK  72   --    CKNDX  Cannot be calulated   --    TROIQ  <0.04  <0.04     Lab Results   Component Value Date/Time    Cholesterol, total 142 01/28/2015 03:59 AM    HDL Cholesterol 54 01/28/2015 03:59 AM    LDL, calculated 76.4 01/28/2015 03:59 AM    Triglyceride 58 01/28/2015 03:59 AM    CHOL/HDL Ratio 2.6 01/28/2015 03:59 AM     Lab Results   Component Value Date/Time    Glucose (POC) 131 04/22/2017 07:24 AM     Lab Results   Component Value Date/Time    Color DARK YELLOW 04/21/2017 03:57 PM    Appearance CLOUDY 04/21/2017 03:57 PM    Specific gravity 1.024 04/21/2017 03:57 PM    pH (UA) 5.0 04/21/2017 03:57 PM    Protein 300 04/21/2017 03:57 PM    Glucose NEGATIVE  04/21/2017 03:57 PM    Ketone TRACE 04/21/2017 03:57 PM    Bilirubin NEGATIVE  01/27/2015 12:20 PM    Urobilinogen 0.2 04/21/2017 03:57 PM    Nitrites NEGATIVE  04/21/2017 03:57 PM    Leukocyte Esterase NEGATIVE  04/21/2017 03:57 PM    Epithelial cells FEW 04/21/2017 03:57 PM    Bacteria NEGATIVE  04/21/2017 03:57 PM    WBC 0-4 04/21/2017 03:57 PM    RBC 5-10 04/21/2017 03:57 PM         Medications Reviewed:     Current Facility-Administered Medications   Medication Dose Route Frequency    acetaminophen (TYLENOL) tablet 500 mg  500 mg Oral Q8H PRN    amLODIPine (NORVASC) tablet 2.5 mg  2.5 mg Oral QHS    citalopram (CELEXA) tablet 20 mg  20 mg Oral DAILY    donepezil (ARICEPT) tablet 5 mg  5 mg Oral QHS    ergocalciferol (ERGOCALCIFEROL) capsule 100,000 Units  100,000 Units Oral EVERY MONTH    gabapentin (NEURONTIN) capsule 300 mg  300 mg Oral QHS    guaiFENesin (ROBITUSSIN) 100 mg/5 mL oral liquid 200 mg  200 mg Oral Q4H PRN    hydrALAZINE (APRESOLINE) tablet 50 mg  50 mg Oral TID    HYDROcodone-acetaminophen (NORCO) 5-325 mg per tablet 1 Tab  1 Tab Oral Q6H PRN    levothyroxine (SYNTHROID) tablet 100 mcg  100 mcg Oral ACB    magnesium oxide (MAG-OX) tablet 400 mg  400 mg Oral DAILY    polyethylene glycol (MIRALAX) packet 17 g  17 g Oral DAILY    simethicone (MYLICON) tablet 425 mg  160 mg Oral Q8H PRN    0.9% sodium chloride infusion  75 mL/hr IntraVENous CONTINUOUS    HYDROmorphone (PF) (DILAUDID) injection 0.5 mg  0.5 mg IntraVENous Q4H PRN    ondansetron (ZOFRAN) injection 4 mg  4 mg IntraVENous Q4H PRN    heparin (porcine) injection 5,000 Units  5,000 Units SubCUTAneous Q8H    albuterol-ipratropium (DUO-NEB) 2.5 MG-0.5 MG/3 ML  3 mL Nebulization Q4H PRN    metroNIDAZOLE (FLAGYL) IVPB premix 500 mg  500 mg IntraVENous Q8H    [START ON 4/23/2017] levoFLOXacin (LEVAQUIN) 500 mg in D5W IVPB  500 mg IntraVENous Q48H    lactobac ac& pc-s.therm-b.anim (ARACELIS Q/RISAQUAD)  1 Cap Oral DAILY    insulin lispro (HUMALOG) injection   SubCUTAneous AC&HS    glucose chewable tablet 16 g  4 Tab Oral PRN    dextrose (D50W) injection syrg 12.5-25 g  12.5-25 g IntraVENous PRN    glucagon (GLUCAGEN) injection 1 mg  1 mg IntraMUSCular PRN    acetaminophen (TYLENOL) 500 mg tablet ______________________________________________________________________  EXPECTED LENGTH OF STAY: - - -  ACTUAL LENGTH OF STAY:          Lisa Finley MD

## 2017-04-22 NOTE — PROCEDURES
[facility]  *** FINAL REPORT ***    Name: Christina Mobley  MRN: [id number]    Inpatient  : 08 Mar 1933  HIS Order #: [id number]  [id number] [facility] Visit #: [id number]  Date: 2017    TYPE OF TEST: Peripheral Venous Testing    REASON FOR TEST  Pain in limb, Limb swelling    Right Leg:-  Deep venous thrombosis:           No  Superficial venous thrombosis:    No  Deep venous insufficiency:        Not examined  Superficial venous insufficiency: Not examined    Left Leg:-  Deep venous thrombosis:           No  Superficial venous thrombosis:    No  Deep venous insufficiency:        Not examined  Superficial venous insufficiency: Not examined      INTERPRETATION/FINDINGS  PROCEDURE:  Color duplex ultrasound imaging of lower extremity veins. FINDINGS:       Right: The common femoral, deep femoral, femoral, popliteal,  posterior tibial, peroneal, and great saphenous are patent and without   evidence of thrombus;  each is fully compressible and there is no  narrowing of the flow channel on color Doppler imaging. Phasic flow  is observed in the common femoral vein. Left:   The common femoral, deep femoral, femoral, popliteal,  posterior tibial, peroneal, and great saphenous are patent and without   evidence of thrombus;  each is fully compressible and there is no  narrowing of the flow channel on color Doppler imaging. Phasic flow  is observed in the common femoral vein. IMPRESSION:  No evidence of right or left lower extremity vein  thrombosis. ADDITIONAL COMMENTS    I have personally reviewed the data relevant to the interpretation of  this  study.     TECHNOLOGIST: Evan Pratt RVT  Signed: 2017 09:36 AM    PHYSICIAN: Ryan Harper MD  Signed: 2017 07:27 AM

## 2017-04-22 NOTE — CONSULTS
Consult    Patient: Lo Infante MRN: [id number]  SSN: [id number]    YOB: 1933  Age: 80 y.o. Sex: female       Subjective:      Date of  Admission: 4/21/2017     Admission type: Emergency     Reason for consult: a.fib    Lo Infante is a 80 y.o. female admitted for Acute delirium. She has chronic dementia and is apparently more confused  She is unable to give any history and is oriented only to her name. The consult was for a.fib and her H&P mentions the initial interpretation of the EKG was a.fib, but this was a misinterpretation. Her EKG shows NSR. As far as I can tell from review of notes she has not have a.fib. Telemetry since she has been here shows NSR. Primary Care Provider: PROVIDER UNKNOWN  Past Medical History:   Diagnosis Date    Asthma     Diabetes ([facility]     Heart failure ([facility]     Hypertension     Stroke [facility])       Past Surgical History:   Procedure Laterality Date    HX GYN      hysterectomy     History reviewed. No pertinent family history.    Social History   Substance Use Topics    Smoking status: Never Smoker    Smokeless tobacco: Not on file    Alcohol use No      Current Facility-Administered Medications   Medication Dose Route Frequency    acetaminophen (TYLENOL) tablet 500 mg  500 mg Oral Q8H PRN    amLODIPine (NORVASC) tablet 2.5 mg  2.5 mg Oral QHS    citalopram (CELEXA) tablet 20 mg  20 mg Oral DAILY    donepezil (ARICEPT) tablet 5 mg  5 mg Oral QHS    ergocalciferol (ERGOCALCIFEROL) capsule 100,000 Units  100,000 Units Oral EVERY MONTH    gabapentin (NEURONTIN) capsule 300 mg  300 mg Oral QHS    guaiFENesin (ROBITUSSIN) 100 mg/5 mL oral liquid 200 mg  200 mg Oral Q4H PRN    hydrALAZINE (APRESOLINE) tablet 50 mg  50 mg Oral TID    HYDROcodone-acetaminophen (NORCO) 5-325 mg per tablet 1 Tab  1 Tab Oral Q6H PRN    levothyroxine (SYNTHROID) tablet 100 mcg  100 mcg Oral ACB    magnesium oxide (MAG-OX) tablet 400 mg  400 mg Oral DAILY    polyethylene glycol (MIRALAX) packet 17 g  17 g Oral DAILY    simethicone (MYLICON) tablet 297 mg  160 mg Oral Q8H PRN    0.9% sodium chloride infusion  75 mL/hr IntraVENous CONTINUOUS    HYDROmorphone (PF) (DILAUDID) injection 0.5 mg  0.5 mg IntraVENous Q4H PRN    ondansetron (ZOFRAN) injection 4 mg  4 mg IntraVENous Q4H PRN    heparin (porcine) injection 5,000 Units  5,000 Units SubCUTAneous Q8H    albuterol-ipratropium (DUO-NEB) 2.5 MG-0.5 MG/3 ML  3 mL Nebulization Q4H PRN    metroNIDAZOLE (FLAGYL) IVPB premix 500 mg  500 mg IntraVENous Q8H    lactobac ac& pc-s.therm-b.anim (ARACELIS Q/RISAQUAD)  1 Cap Oral DAILY    glucose chewable tablet 16 g  4 Tab Oral PRN    dextrose (D50W) injection syrg 12.5-25 g  12.5-25 g IntraVENous PRN    glucagon (GLUCAGEN) injection 1 mg  1 mg IntraMUSCular PRN    insulin lispro (HUMALOG) injection   SubCUTAneous Q6H    levoFLOXacin (LEVAQUIN) 250 mg in D5W IVPB  250 mg IntraVENous Q24H        Allergies   Allergen Reactions    Aspirin Unknown (comments)    Isoniazid Other (comments)    Pcn [Penicillins] Rash    Sulfa (Sulfonamide Antibiotics) Rash        Review of Systems:  A comprehensive review of systems was negative except for that written in the History of Present Illness. Subjective:       Physical Exam:  Visit Vitals    /60 (BP 1 Location: Right arm, BP Patient Position: Head of bed elevated (Comment degrees))    Pulse 90    Temp 99.1 °F (37.3 °C)    Resp 21    Ht 5' 3\" (1.6 m)    Wt 99 kg (218 lb 4.8 oz)    SpO2 96%    Breastfeeding No    BMI 38.67 kg/m2     General Appearance:  Elderly female in no acute distress. Oriented x 1   Ears/Nose/Mouth/Throat:   Hearing grossly normal.         Neck: Supple. Chest:   Lungs clear to auscultation bilaterally. Cardiovascular:  Regular rate and rhythm, S1, S2 normal, no murmur. Abdomen:   Soft, non-tender, bowel sounds are active. Extremities: No edema bilaterally. Skin: Warm and dry. Cardiographics:  Telemetry: normal sinus rhythm  ECG: normal sinus rhythm, nonspecific ST and T waves changes  Echocardiogram: Not done    Data Reviewed:   BMP:   Lab Results   Component Value Date/Time     04/22/2017 03:36 AM    K 4.5 04/22/2017 03:36 AM     04/22/2017 03:36 AM    CO2 26 04/22/2017 03:36 AM    AGAP 8 04/22/2017 03:36 AM     (H) 04/22/2017 03:36 AM    BUN 32 (H) 04/22/2017 03:36 AM    CREA 1.80 (H) 04/22/2017 03:36 AM    GFRAA 32 (L) 04/22/2017 03:36 AM    GFRNA 27 (L) 04/22/2017 03:36 AM     CBC:   Lab Results   Component Value Date/Time    WBC 18.0 (H) 04/22/2017 03:36 AM    HGB 9.8 (L) 04/22/2017 03:36 AM    HCT 31.0 (L) 04/22/2017 03:36 AM     04/22/2017 03:36 AM     All Cardiac Markers in the last 24 hours:   Lab Results   Component Value Date/Time    CPK 72 04/22/2017 03:36 AM    CKMB <1.0 04/22/2017 03:36 AM    CKNDX Cannot be calulated 04/22/2017 03:36 AM    TROIQ <0.04 04/22/2017 03:36 AM    TROIQ <0.04 04/21/2017 03:57 PM        Assessment:      1) Confusion  2) Dementia    She does not have a.fib currently and has not been in a.fib at any time during this admission. I do not see any evidence she has had documented afib prior to this admission either. Her initial EKG was misinterpreted. Plan:     Signing off.     Signed By: Kay Katz MD     April 22, 2017

## 2017-04-22 NOTE — H&P
[address]   [address] [facility], [address] [place] [address]   HISTORY AND PHYSICAL       Name:  Jose Angel Johnson   MR#:  [id number]   :  1933   Account #:  [de-identified]        Date of Adm:  2017       PRIMARY CARE PHYSICIAN: Unknown. SOURCE OF INFORMATION: The patient's son, who was present at   the bedside. CHIEF COMPLAINT: Change in mental status. HISTORY OF PRESENT ILLNESS: This is an 80-year-old woman with   a past medical history significant for stroke with predominantly right   upper extremity weakness, hypertension, type 2 diabetes, asthma,   congestive heart failure, dementia, hypothyroidism, who was in her   usual state of health until the day of presentation at the emergency   room when the patient developed change in mental status. The patient   resides at a nursing home. She goes to  from the nursing   home every other day. When the patient woke up this morning it was   noted that the patient's mental status had changed, but she was taken   to the adult . She was sent from the adult  to the   emergency room for further evaluation. According to the son, the   patient is normally alert and able to eat at baseline. She does not do   more than that, but today the patient is not alert, would not open her   eyes to verbal stimuli. This is a change for the patient, according to the   son. The last time the son saw the patient at the nursing home was 4   days ago and she was in her usual state of health at that time. It was   also reported that the patient complained of abdominal pain which is   located at the right upper quadrant, the pain is poorly characterize   because of the patient's mental status. When the patient arrived at the   emergency room, a CT scan of the head was obtained, this did not   show acute pathology. CT scan of abdomen was then obtained, the CT   scan shows evidence of cholecystitis.  The ultrasound also shows   evidence of gallbladder disease. The patient also has leukocytosis. There was a concern for acute cholecystitis, the emergency room   physician consulted the surgeon on-call. The patient was seen by the   surgeon while she was still in the emergency room. Medical treatment   was advised which will consist of antibiotics. Performing a HIDA scan   was also advised by the surgical service. The patient was   subsequently referred to the hospitalist service for evaluation for   admission. There was no history of fever, no rigors, and no chills. PAST MEDICAL HISTORY: Congestive heart failure, stroke,   hypertension, type 2 diabetes, asthma, dementia, hypothyroidism. ALLERGIES   THE PATIENT IS ALLERGIC TO:   1. ASPIRIN. 2. PENICILLIN. 3. ISONIAZID. MEDICATIONS   1. Tylenol 500 mg every 8 hours as needed for pain. 2. Norvasc 2.5 mg daily. 3. Celexa 20 mg daily. 4. Plavix 75 mg daily. 5. Aricept 5 mg daily. 6. Lasix 20 mg every other day. 7. Neurontin 300 mg daily at bedtime. 8. Apresoline 50 mg 3 times daily. 9. Norco 5/325 one tablet every 6 hours as needed for pain. 10. Synthroid 100 mcg daily. 11. Lisinopril 40 mg daily. 12. Magnesium oxide 400 mg daily. 13. MiraLax 17 grams daily. FAMILY HISTORY: This was reviewed. No family history of stroke. PAST SURGICAL HISTORY: This is significant for hysterectomy. SOCIAL HISTORY: No history of alcohol or tobacco abuse. The   patient resides at a nursing home. REVIEW OF SYSTEMS: Unable to obtain because of the patient's   mental status. PHYSICAL EXAMINATION   GENERAL APPEARANCE: The patient appeared ill, in moderate   distress. VITAL SIGNS: On arrival at the emergency room, temperature 99.9,   pulse 61, respiratory rate 18. Blood pressure 142/61, oxygen   saturation 96% on room air. HEAD: Normocephalic, atraumatic. EYES: Unable to assess eye movement, but no redness, no drainage,   no discharge.    EARS: Normal external ears with no obvious drainage. NOSE: No deformity and no drainage. MOUTH AND THROAT: Poor oral hygiene. Dry oral mucosa. NECK: Supple. No JVD. No thyromegaly. CHEST: Few expiratory wheezing. No crackles. HEART: Normal S1 and S2. Irregularly irregular. No clinically   appreciable murmur. ABDOMEN: Soft. Mild right upper quadrant tenderness. No rebound   tenderness, no guarding. Normal bowel sounds. CENTRAL NERVOUS SYSTEM: Lethargic, but arousable. EXTREMITIES: Edema 2+, pulses 2+ bilaterally. MUSCULOSKELETAL: Contracture of the right upper extremity noted. SKIN: No active skin lesions seen in exposed parts of the body. PSYCHIATRY: Normal mood, but flat affect. LYMPHATICS: No cervical lymphadenopathy. DIAGNOSTIC DATA: EKG shows atrial fibrillation and nonspecific ST   and T-wave abnormalities. Chest x-ray shows mild cardiomegaly with   increased vascular congestion. CT scan of the abdomen and pelvis   shows evidence of cholecystitis. CT scan of the head without contrast   negative for acute pathology. Abdominal ultrasound shows a distended   gallbladder with gallstones, no biliary ductal dilatation. LABORATORY DATA: Urine toxicology negative. Urinalysis: This is   significant for negative nitrites, negative leukocyte esterase. Serum   alcohol level less than 10. Lactic acid level 2.3. A repeat lactic acid   level was 1.7. Cardiac profile: Troponin less than 0.04. Chemistry:   Sodium 136, potassium 4.4, chloride 101, CO2 of 25, glucose 178,   BUN 28, creatinine 1.67, calcium 9.4, bilirubin total 0.7, ALT 25, AST   23, alkaline phosphatase 113. Total protein 7.7, albumin level 3.7,   globulin 4.0. Hematology: WBC 19.0, hemoglobin 10.8, hematocrit   34.4, platelets 235. ASSESSMENT   1. Acute delirium. 2. Suspected acute cholecystitis. 3. Hypertension. 4. Hypothyroidism. 5. Dementia. 6. Type 2 diabetes with hyperglycemia. 7. Leukocytosis. 8. Acute renal failure. 9. Asthma.    10. Persistent atrial fibrillation. 11. Leg swelling. PLAN   1. Acute delirium. This is most likely due to metabolic event, but the   patient has had a stroke in the past. Because of that, we will obtain an   MRI of the brain to evaluate the patient for recurrent stroke as a   possible cause of acute delirium. Will monitor the patient closely. 2. Suspected acute cholecystitis. Will obtain a HIDA scan. Will   continue with Levaquin and Flagyl started in the emergency room. Will   await further recommendation from the surgeon. The patient may   require cholecystectomy tube placement if the patient is not a surgical candidate   because of multiple medical problems and that this is only if the HIDA   scan is positive for cholecystitis. 3. Hypertension. We will resume preadmission medication. We will   monitor the patient's blood pressure closely. 4. Hypothyroidism. Will continue with Synthroid. Will check a TSH level. 5. Dementia. Will continue with supportive therapy as well as Aricept. 6. Type 2 diabetes with hyperglycemia. Will place the patient on sliding   scale with insulin coverage. Will check hemoglobin A1c level. The   patient is not on any medication at home. 7. Leukocytosis. This is most likely due to the suspected acute   cholecystitis. We will treat the underlying etiological factor. 8. Acute renal failure. This is most likely due to volume depletion. Will   hold Lasix. Will carry out gentle hydration. Will monitor the patient's   renal function. 9. Asthma. The patient is stable. We will place the patient on DuoNeb. 10. Persistent atrial fibrillation. Will check TSH. We will obtain an   echocardiogram. Cardiology consult will be requested to assist in   evaluation and treatment. 11. Leg swelling. Will check ultrasound of the lower extremities for   deep venous thrombosis. OTHER ISSUES: Code status: The patient is a DNR. This was   discussed with the patient's son at the bedside.  We will place the   patient on heparin for DVT prophylaxis.         Verner Castillo, MD RE / FRANK   D:  04/22/2017   00:24   T:  04/22/2017   01:13   Job #:  [id number]

## 2017-04-22 NOTE — PROGRESS NOTES
Progress Note    Patient: James Holland MRN: [id number]  SSN: [id number]    YOB: 1933  Age: 80 y.o. Sex: female      Admit Date: 2017    Abdominal pain    Subjective:     No acute surgical issues. Pt still reporting RUQ pain. Pt with significant wheezing. Objective:     Visit Vitals    /61 (BP 1 Location: Left arm, BP Patient Position: At rest)    Pulse 88    Temp 100.1 °F (37.8 °C)    Resp 22    Ht 5' 3\" (1.6 m)    Wt 218 lb 4.8 oz (99 kg)    SpO2 96%    Breastfeeding No    BMI 38.67 kg/m2       Temp (24hrs), Av.7 °F (37.6 °C), Min:99.1 °F (37.3 °C), Max:100.3 °F (37.9 °C)        Physical Exam:    Gen:  NAD  Pulm:  Unlabored  Abd:  S/moderately distended/mild  TTP    Recent Results (from the past 24 hour(s))   LACTIC ACID, PLASMA    Collection Time: 17  8:20 PM   Result Value Ref Range    Lactic acid 1.7 0.4 - 2.0 MMOL/L   CBC W/O DIFF    Collection Time: 17  3:36 AM   Result Value Ref Range    WBC 18.0 (H) 3.6 - 11.0 K/uL    RBC 3.85 3.80 - 5.20 M/uL    HGB 9.8 (L) 11.5 - 16.0 g/dL    HCT 31.0 (L) 35.0 - 47.0 %    MCV 80.5 80.0 - 99.0 FL    MCH 25.5 (L) 26.0 - 34.0 PG    MCHC 31.6 30.0 - 36.5 g/dL    RDW 15.5 (H) 11.5 - 14.5 %    PLATELET 080 595 - 862 K/uL   METABOLIC PANEL, COMPREHENSIVE    Collection Time: 17  3:36 AM   Result Value Ref Range    Sodium 139 136 - 145 mmol/L    Potassium 4.5 3.5 - 5.1 mmol/L    Chloride 105 97 - 108 mmol/L    CO2 26 21 - 32 mmol/L    Anion gap 8 5 - 15 mmol/L    Glucose 145 (H) 65 - 100 mg/dL    BUN 32 (H) 6 - 20 MG/DL    Creatinine 1.80 (H) 0.55 - 1.02 MG/DL    BUN/Creatinine ratio 18 12 - 20      GFR est AA 32 (L) >60 ml/min/1.73m2    GFR est non-AA 27 (L) >60 ml/min/1.73m2    Calcium 9.1 8.5 - 10.1 MG/DL    Bilirubin, total 0.6 0.2 - 1.0 MG/DL    ALT (SGPT) 22 12 - 78 U/L    AST (SGOT) 17 15 - 37 U/L    Alk.  phosphatase 90 45 - 117 U/L    Protein, total 7.2 6.4 - 8.2 g/dL    Albumin 3.2 (L) 3.5 - 5.0 g/dL Globulin 4.0 2.0 - 4.0 g/dL    A-G Ratio 0.8 (L) 1.1 - 2.2     TROPONIN I    Collection Time: 04/22/17  3:36 AM   Result Value Ref Range    Troponin-I, Qt. <0.04 <0.05 ng/mL   PHOSPHORUS    Collection Time: 04/22/17  3:36 AM   Result Value Ref Range    Phosphorus 3.0 2.6 - 4.7 MG/DL   MAGNESIUM    Collection Time: 04/22/17  3:36 AM   Result Value Ref Range    Magnesium 1.9 1.6 - 2.4 mg/dL   CK W/ CKMB & INDEX    Collection Time: 04/22/17  3:36 AM   Result Value Ref Range    CK 72 26 - 192 U/L    CK - MB <1.0 <3.6 NG/ML    CK-MB Index Cannot be calulated 0 - 2.5     TSH 3RD GENERATION    Collection Time: 04/22/17  3:36 AM   Result Value Ref Range    TSH 1.96 0.36 - 3.74 uIU/mL   HEMOGLOBIN A1C WITH EAG    Collection Time: 04/22/17  3:36 AM   Result Value Ref Range    Hemoglobin A1c 5.7 4.2 - 6.3 %    Est. average glucose 117 mg/dL   AMYLASE    Collection Time: 04/22/17  3:36 AM   Result Value Ref Range    Amylase 73 25 - 115 U/L   LIPASE    Collection Time: 04/22/17  3:36 AM   Result Value Ref Range    Lipase 38 (L) 73 - 393 U/L   AMMONIA    Collection Time: 04/22/17  3:36 AM   Result Value Ref Range    Ammonia 15 <32 UMOL/L   GLUCOSE, POC    Collection Time: 04/22/17  7:24 AM   Result Value Ref Range    Glucose (POC) 131 (H) 65 - 100 mg/dL    Performed by Eljanesda Gregorio    GLUCOSE, POC    Collection Time: 04/22/17 11:35 AM   Result Value Ref Range    Glucose (POC) 139 (H) 65 - 100 mg/dL    Performed by Clearance Cushing    GLUCOSE, POC    Collection Time: 04/22/17  5:36 PM   Result Value Ref Range    Glucose (POC) 146 (H) 65 - 100 mg/dL    Performed by Blair Aguilar          Assessment:     Hospital Problems  Date Reviewed: 4/22/2017          Codes Class Noted POA    * (Principal)Acute delirium ICD-10-CM: R41.0  ICD-9-CM: 780.09  4/22/2017 Yes              Plan/Recommendations/Medical Decision Making:     - Abdominal pain:  Most likely related to cholecystitis but would recommend HIDA to evaluate  - If HIDA is abnormal then will plan for percutaneous cholecystostomy tube versus lap cholecystectomy.   Lap cholecystectomy would be difficult at this time given significant comorbidity and Plavix   - Continue to hold Plavix for now  - NPO with sips of clears  - Continue IV antibiotic    Signed By: Manuel Zaidi MD     April 22, 2017

## 2017-04-22 NOTE — ROUTINE PROCESS
Bedside shift change report given to Blair Kam RN (oncoming nurse) by Jenn Patricio RN (offgoing nurse). Report included the following information SBAR, ED Summary, MAR, Accordion, Recent Results and Med Rec Status.

## 2017-04-22 NOTE — ROUTINE PROCESS
Primary Nurse Lalito Méndez RN and Mitali Escobar RN performed a dual skin assessment on this patient Impairment: skin tears: R gluteal fold, under left breast  Enrico score is 15

## 2017-04-22 NOTE — CONSULTS
Chief Complaint:  Abdominal pain    HPI:  79 yo woman with hx DM, CVA, HTN, questionable dementia, Heart failure, asthma who presented from SNF with confusion. Pt was reportedly altered this morning. Pt also reported some abdominal pain especially in RUQ. No reported nausea, vomiting. No fever or chills. Pt had CT scan performed which showed cholelithiasis and some pericholecystic stranding. RUQ US showed cholelithiasis without cholecystitis. Pt does have leukocytosis. Past Medical History:   Diagnosis Date    Asthma     [facility]     Heart failure ([facility]     Hypertension     Stroke [facility])        Past Surgical History:   Procedure Laterality Date    HX GYN      hysterectomy       No current facility-administered medications on file prior to encounter. Current Outpatient Prescriptions on File Prior to Encounter   Medication Sig Dispense Refill    acetaminophen (TYLENOL) 500 mg tablet Take 500 mg by mouth every eight (8) hours as needed for Pain. [facility]      citalopram (CELEXA) 20 mg tablet Take 20 mg by mouth daily.  furosemide (LASIX) 20 mg tablet Take 20 mg by mouth every Monday, Wednesday, Friday.  hydrALAZINE (APRESOLINE) 100 mg tablet Take 50 mg by mouth three (3) times daily.  levothyroxine (SYNTHROID) 100 mcg tablet Take 100 mcg by mouth Daily (before breakfast).  amLODIPine (NORVASC) 2.5 mg tablet Take 2.5 mg by mouth nightly.  clopidogrel (PLAVIX) 75 mg tablet Take 75 mg by mouth daily.          Allergies   Allergen Reactions    Aspirin Unknown (comments)    Isoniazid Other (comments)    Pcn [Penicillins] Rash    Sulfa (Sulfonamide Antibiotics) Rash       Review of Systems - General ROS: negative  Psychological ROS: negative  Respiratory ROS: negative  Cardiovascular ROS: negative  Gastrointestinal ROS: positive for - abdominal pain     Visit Vitals    /60    Pulse 87    Temp 99.9 °F (37.7 °C)    Resp 20    SpO2 95%         Physical Exam:    Gen:  NAD  Pulm:  Unlabored  Abd:  S/mildly distended/mild TTP in RUQ    Recent Results (from the past 24 hour(s))   CBC WITH AUTOMATED DIFF    Collection Time: 04/21/17  3:57 PM   Result Value Ref Range    WBC 19.0 (H) 3.6 - 11.0 K/uL    RBC 4.26 3.80 - 5.20 M/uL    HGB 10.8 (L) 11.5 - 16.0 g/dL    HCT 34.4 (L) 35.0 - 47.0 %    MCV 80.8 80.0 - 99.0 FL    MCH 25.4 (L) 26.0 - 34.0 PG    MCHC 31.4 30.0 - 36.5 g/dL    RDW 15.5 (H) 11.5 - 14.5 %    PLATELET 470 508 - 411 K/uL    NEUTROPHILS 87 (H) 32 - 75 %    LYMPHOCYTES 6 (L) 12 - 49 %    MONOCYTES 7 5 - 13 %    EOSINOPHILS 0 0 - 7 %    BASOPHILS 0 0 - 1 %    ABS. NEUTROPHILS 16.5 (H) 1.8 - 8.0 K/UL    ABS. LYMPHOCYTES 1.2 0.8 - 3.5 K/UL    ABS. MONOCYTES 1.3 (H) 0.0 - 1.0 K/UL    ABS. EOSINOPHILS 0.0 0.0 - 0.4 K/UL    ABS. BASOPHILS 0.0 0.0 - 0.1 K/UL   METABOLIC PANEL, COMPREHENSIVE    Collection Time: 04/21/17  3:57 PM   Result Value Ref Range    Sodium 136 136 - 145 mmol/L    Potassium 4.4 3.5 - 5.1 mmol/L    Chloride 101 97 - 108 mmol/L    CO2 25 21 - 32 mmol/L    Anion gap 10 5 - 15 mmol/L    Glucose 178 (H) 65 - 100 mg/dL    BUN 28 (H) 6 - 20 MG/DL    Creatinine 1.67 (H) 0.55 - 1.02 MG/DL    BUN/Creatinine ratio 17 12 - 20      GFR est AA 35 (L) >60 ml/min/1.73m2    GFR est non-AA 29 (L) >60 ml/min/1.73m2    Calcium 9.4 8.5 - 10.1 MG/DL    Bilirubin, total 0.7 0.2 - 1.0 MG/DL    ALT (SGPT) 25 12 - 78 U/L    AST (SGOT) 23 15 - 37 U/L    Alk.  phosphatase 112 45 - 117 U/L    Protein, total 7.7 6.4 - 8.2 g/dL    Albumin 3.7 3.5 - 5.0 g/dL    Globulin 4.0 2.0 - 4.0 g/dL    A-G Ratio 0.9 (L) 1.1 - 2.2     TROPONIN I    Collection Time: 04/21/17  3:57 PM   Result Value Ref Range    Troponin-I, Qt. <0.04 <0.05 ng/mL   LACTIC ACID, PLASMA    Collection Time: 04/21/17  3:57 PM   Result Value Ref Range    Lactic acid 2.3 (HH) 0.4 - 2.0 MMOL/L   URINALYSIS W/ RFLX MICROSCOPIC    Collection Time: 04/21/17  3:57 PM   Result Value Ref Range Color DARK YELLOW      Appearance CLOUDY (A) CLEAR      Specific gravity 1.024 1.003 - 1.030      pH (UA) 5.0 5.0 - 8.0      Protein 300 (A) NEG mg/dL    Glucose NEGATIVE  NEG mg/dL    Ketone TRACE (A) NEG mg/dL    Blood NEGATIVE  NEG      Urobilinogen 0.2 0.2 - 1.0 EU/dL    Nitrites NEGATIVE  NEG      Leukocyte Esterase NEGATIVE  NEG      WBC 0-4 0 - 4 /hpf    RBC 5-10 0 - 5 /hpf    Epithelial cells FEW FEW /lpf    Bacteria NEGATIVE  NEG /hpf    Amorphous Crystals 3+ (A) NEG    Hyaline cast 10-20 0 - 5 /lpf    Granular cast 2-5 (A) NEG /lpf   ETHYL ALCOHOL    Collection Time: 04/21/17  3:57 PM   Result Value Ref Range    ALCOHOL(ETHYL),SERUM <10 <10 MG/DL   BILIRUBIN, CONFIRM    Collection Time: 04/21/17  3:57 PM   Result Value Ref Range    Bilirubin UA, confirm NEGATIVE  NEG     DRUG SCREEN, URINE    Collection Time: 04/21/17  3:57 PM   Result Value Ref Range    AMPHETAMINE NEGATIVE  NEG      BARBITURATES NEGATIVE  NEG      BENZODIAZEPINE NEGATIVE  NEG      COCAINE NEGATIVE  NEG      METHADONE NEGATIVE  NEG      OPIATES NEGATIVE  NEG      PCP(PHENCYCLIDINE) NEGATIVE  NEG      THC (TH-CANNABINOL) NEGATIVE  NEG      Drug screen comment (NOTE)    EKG, 12 LEAD, INITIAL    Collection Time: 04/21/17  4:09 PM   Result Value Ref Range    Ventricular Rate 63 BPM    Atrial Rate 69 BPM    QRS Duration 82 ms    Q-T Interval 448 ms    QTC Calculation (Bezet) 458 ms    Calculated R Axis 5 degrees    Calculated T Axis 50 degrees    Diagnosis       Atrial fibrillation  Inferior infarct (cited on or before 27-JAN-2015)  When compared with ECG of 27-JAN-2015 10:19,  Atrial fibrillation has replaced Sinus rhythm  Non-specific change in ST segment in Inferior leads     LACTIC ACID, PLASMA    Collection Time: 04/21/17  8:20 PM   Result Value Ref Range    Lactic acid 1.7 0.4 - 2.0 MMOL/L       AP: 79 yo woman with altered mental status consulted for possible cholecystitis    - Abdominal pain:  May be cholecystitis but RUQ US failed to showed gallbladder wall thickening  - Patient is a high surgical risk due to CVA, heart failure and actively wheezing in ED  - Recommend medical evaluation for admission for IV antibiotic and IV fluid hydration  - No acute indication for cholecystectomy at this time. Patient is also on Plavix which will need to be held if an operation is indicated.   - If abdominal pain and AMS persist then recommend HIDA   - Labs in am

## 2017-04-22 NOTE — ROUTINE PROCESS
TRANSFER - OUT REPORT:    Verbal report given to [address], RN (name[facility] ([facility] for routine progression of care       Report consisted of patients Situation, Background, Assessment and   Recommendations(SBAR). Information from the following report(s) SBAR, ED Summary, [facility] and Recent Results was reviewed with the receiving nurse. Lines:   Peripheral IV 04/21/17 Left Antecubital (Active)   Site Assessment Clean, dry, & intact 4/21/2017  7:04 PM   Phlebitis Assessment 0 4/21/2017  7:04 PM   Infiltration Assessment 0 4/21/2017  7:04 PM   Dressing Status Clean, dry, & intact 4/21/2017  7:04 PM   Dressing Type Transparent 4/21/2017  7:04 PM       Peripheral IV 04/21/17 Right Forearm (Active)   Site Assessment Clean, dry, & intact 4/21/2017  7:04 PM   Phlebitis Assessment 0 4/21/2017  7:04 PM   Infiltration Assessment 0 4/21/2017  7:04 PM   Dressing Status Clean, dry, & intact 4/21/2017  7:04 PM   Dressing Type Transparent 4/21/2017  7:04 PM        Opportunity for questions and clarification was provided.       Patient transported with:   Monitor  Registered Nurse

## 2017-04-22 NOTE — PROGRESS NOTES
Day #2 of Levaquin  Indication:  Suspected acute cholecystitis  Current regimen:  750 mg IV Q 24 hr  Abx regimen:  Levaquin + Flagyl  ID Following ?: NO  Frequency of BMP?: Not ordered, last done   Recent Labs      17   0336  17   1557   WBC  18.0*  19.0*   CREA  1.80*  1.67*   BUN  32*  28*     Est CrCl: ~20-30 ml/min  Temp (24hrs), Av.8 °F (37.7 °C), Min:99.3 °F (37.4 °C), Max:100.3 °F (37.9 °C)    Cultures:    Blood: NGTD    Plan: Change to 250 mg IV Q 24 hr per [facility] P&T Committee Protocol with respect to renal function. Pharmacy will continue to monitor patient daily and will make dosage adjustments based upon changing renal function.

## 2017-04-22 NOTE — ED NOTES
Spoke with pt's son Dara Lindsey. Son stating that pt is typically more alert at baseline and able to hold logical conversations.  Son updated on pt's status and informed of pt's admission, son stating he will come to hospital to be with patient

## 2017-04-23 LAB
ANION GAP BLD CALC-SCNC: 5 MMOL/L (ref 5–15)
BACTERIA SPEC CULT: NORMAL
BACTERIA SPEC CULT: NORMAL
BASOPHILS # BLD AUTO: 0 K/UL (ref 0–0.1)
BASOPHILS # BLD: 0 % (ref 0–1)
BUN SERPL-MCNC: 37 MG/DL (ref 6–20)
BUN/CREAT SERPL: 25 (ref 12–20)
CALCIUM SERPL-MCNC: 8.6 MG/DL (ref 8.5–10.1)
CHLORIDE SERPL-SCNC: 107 MMOL/L (ref 97–108)
CO2 SERPL-SCNC: 28 MMOL/L (ref 21–32)
CREAT SERPL-MCNC: 1.49 MG/DL (ref 0.55–1.02)
EOSINOPHIL # BLD: 0 K/UL (ref 0–0.4)
EOSINOPHIL NFR BLD: 0 % (ref 0–7)
ERYTHROCYTE [DISTWIDTH] IN BLOOD BY AUTOMATED COUNT: 15.4 % (ref 11.5–14.5)
GLUCOSE BLD STRIP.AUTO-MCNC: 105 MG/DL (ref 65–100)
GLUCOSE BLD STRIP.AUTO-MCNC: 121 MG/DL (ref 65–100)
GLUCOSE BLD STRIP.AUTO-MCNC: 122 MG/DL (ref 65–100)
GLUCOSE BLD STRIP.AUTO-MCNC: 123 MG/DL (ref 65–100)
GLUCOSE BLD STRIP.AUTO-MCNC: 129 MG/DL (ref 65–100)
GLUCOSE SERPL-MCNC: 132 MG/DL (ref 65–100)
HCT VFR BLD AUTO: 28.6 % (ref 35–47)
HGB BLD-MCNC: 9.1 G/DL (ref 11.5–16)
LYMPHOCYTES # BLD AUTO: 9 % (ref 12–49)
LYMPHOCYTES # BLD: 1.4 K/UL (ref 0.8–3.5)
MCH RBC QN AUTO: 25.3 PG (ref 26–34)
MCHC RBC AUTO-ENTMCNC: 31.8 G/DL (ref 30–36.5)
MCV RBC AUTO: 79.4 FL (ref 80–99)
MONOCYTES # BLD: 1.1 K/UL (ref 0–1)
MONOCYTES NFR BLD AUTO: 7 % (ref 5–13)
NEUTS SEG # BLD: 13.8 K/UL (ref 1.8–8)
NEUTS SEG NFR BLD AUTO: 84 % (ref 32–75)
PLATELET # BLD AUTO: 303 K/UL (ref 150–400)
POTASSIUM SERPL-SCNC: 4.3 MMOL/L (ref 3.5–5.1)
RBC # BLD AUTO: 3.6 M/UL (ref 3.8–5.2)
SERVICE CMNT-IMP: ABNORMAL
SERVICE CMNT-IMP: NORMAL
SODIUM SERPL-SCNC: 140 MMOL/L (ref 136–145)
WBC # BLD AUTO: 16.3 K/UL (ref 3.6–11)

## 2017-04-23 PROCEDURE — 80048 BASIC METABOLIC PNL TOTAL CA: CPT | Performed by: STUDENT IN AN ORGANIZED HEALTH CARE EDUCATION/TRAINING PROGRAM

## 2017-04-23 PROCEDURE — 74011250636 HC RX REV CODE- 250/636: Performed by: INTERNAL MEDICINE

## 2017-04-23 PROCEDURE — 74011250636 HC RX REV CODE- 250/636: Performed by: STUDENT IN AN ORGANIZED HEALTH CARE EDUCATION/TRAINING PROGRAM

## 2017-04-23 PROCEDURE — 74011000250 HC RX REV CODE- 250: Performed by: INTERNAL MEDICINE

## 2017-04-23 PROCEDURE — 36415 COLL VENOUS BLD VENIPUNCTURE: CPT | Performed by: STUDENT IN AN ORGANIZED HEALTH CARE EDUCATION/TRAINING PROGRAM

## 2017-04-23 PROCEDURE — 77030029684 HC NEB SM VOL KT MONA -A

## 2017-04-23 PROCEDURE — 94640 AIRWAY INHALATION TREATMENT: CPT

## 2017-04-23 PROCEDURE — 65660000000 HC RM CCU STEPDOWN

## 2017-04-23 PROCEDURE — 85025 COMPLETE CBC W/AUTO DIFF WBC: CPT | Performed by: STUDENT IN AN ORGANIZED HEALTH CARE EDUCATION/TRAINING PROGRAM

## 2017-04-23 RX ORDER — VANCOMYCIN HYDROCHLORIDE
1250 EVERY 24 HOURS
Status: DISCONTINUED | OUTPATIENT
Start: 2017-04-23 | End: 2017-04-23

## 2017-04-23 RX ADMIN — LEVOFLOXACIN 250 MG: 5 INJECTION, SOLUTION INTRAVENOUS at 22:46

## 2017-04-23 RX ADMIN — IPRATROPIUM BROMIDE AND ALBUTEROL SULFATE 3 ML: .5; 3 SOLUTION RESPIRATORY (INHALATION) at 18:22

## 2017-04-23 RX ADMIN — SODIUM CHLORIDE 75 ML/HR: 900 INJECTION, SOLUTION INTRAVENOUS at 15:31

## 2017-04-23 RX ADMIN — HEPARIN SODIUM 5000 UNITS: 5000 INJECTION, SOLUTION INTRAVENOUS; SUBCUTANEOUS at 12:45

## 2017-04-23 RX ADMIN — METRONIDAZOLE 500 MG: 500 INJECTION, SOLUTION INTRAVENOUS at 21:36

## 2017-04-23 RX ADMIN — HEPARIN SODIUM 5000 UNITS: 5000 INJECTION, SOLUTION INTRAVENOUS; SUBCUTANEOUS at 21:36

## 2017-04-23 RX ADMIN — HEPARIN SODIUM 5000 UNITS: 5000 INJECTION, SOLUTION INTRAVENOUS; SUBCUTANEOUS at 04:30

## 2017-04-23 RX ADMIN — METRONIDAZOLE 500 MG: 500 INJECTION, SOLUTION INTRAVENOUS at 04:30

## 2017-04-23 RX ADMIN — METRONIDAZOLE 500 MG: 500 INJECTION, SOLUTION INTRAVENOUS at 12:44

## 2017-04-23 NOTE — PROGRESS NOTES
Day #2 of Vancomycin  Indication:  Possible GPC bacteremia; acute cholecystitis  Current regimen:  2 gm IV x 1 dose (given  @ 1506)  Abx regimen:  Levaquin + Flagyl + Vancomycin  ID Following ?: NO  Concomitant nephrotoxic drugs (requires more frequent monitoring): None  Frequency of BMP?: Daily    Recent Labs      17   0243  17   0336  17   1557   WBC  16.3*  18.0*  19.0*   CREA  1.49*  1.80*  1.67*   BUN  37*  32*  28*     Est CrCl: ~30-35 ml/min  Temp (24hrs), Av.6 °F (37.6 °C), Min:99.1 °F (37.3 °C), Max:100.1 °F (37.8 °C)    Cultures:    Blood: CoNS in 1/2 bottles, pending   MRSA screen: pending    Goal trough = 15 - 20 mcg/mL    Recent trough history (date/time/level/dose/action taken):  None    Plan: Given the improvement in the patient's Scr, a maintenance dose of vancomycin 1250 mg IV Q 24 hr will be started this afternoon at 1500.

## 2017-04-23 NOTE — PROGRESS NOTES
Bedside shift change report given to Jareth Naranjo (oncoming nurse) by Ghazal Coy RN (offgoing nurse). Report included the following information SBAR, Kardex, Intake/Output, MAR, Recent Results and Cardiac Rhythm SR 80s.

## 2017-04-23 NOTE — PROGRESS NOTES
Hospitalist Progress Note  Riki Prescott MD  Office: [phone]  Cell: [phone]      Date of Service:  2017  NAME:  Dennis Bobo  :  3/8/1933  MRN:  [id number]      Admission Summary:    80year old female with history of prior CVA with residual right-sided weakness, HTN, DM-2, dementia, chronic diastolic heart failure, hypothyroidism presented on 17 with altered mental status and poorly characterized right upper quadrant abdominal pain. She was noted to have findings concerning for acute cholecystitis on CT Abd/Pelvis. Interval history / Subjective:    Mainly mumbling words still, grimaces to palpation of right upper quadrant, otherwise no pain apparent. Assessment & Plan:     1. Delirium, acute metabolic encephalopathy   - suspect secondary to sepsis in setting of possible acute cholecystitis   - CT head  - Chronic microvascular ischemic change. No acute intracranial process. - MRI brain  - No evidence for acute infarction. Mild to moderate bilateral subcortical and periventricular increased T2/FLAIR signal abnormalities are nonspecific but may represent changes of chronic small  vessel ischemic disease.   - treating underlying conditions as below    2. Suspected sepsis secondary to acute cholecystitis   - with tachycardia, leukocytosis upon admission   - CT Abd/Pelvis  - Findings are suggestive of acute cholecystitis. - Abd U/S  - Distended gallbladder with gallstones; no biliary ductal dilatation. No right hydronephrosis. - blood cultures  - gram positive cocci in clusters in 1/2 bottles drawn - coagulase negative Staphylococcus   - on empiric levofloxacin, Flagyl, added vancomycin due to GPCs in clusters as above, however, this is likely contaminant   - repeat blood cultures in AM   - await HIDA scan    3.   Possible STACEY   - unclear baseline Cr, possibly some underlying CKD, Cr elevated to 1.80 upon admission, suspect prerenal azotemia and sepsis contributing, seemingly improving   - IV fluid hydration    4. Prior CVA    - holding home Plavix in setting of possible need for intervention     5. Chronic diastolic heart failure   - does not currently appear in acute exacerbation of this diagnosis   - unclear baseline NYHA classification, unable to determine at this juncture given her mentation   - on hydralazine    6. Dementia   - continue home Aricept, Celexa    7. DM-2   - sliding scale insulin coverage   - continue home gabapentin    8. HTN   - resume home amlodipine, hydralazine    9. Hypothyroidism   - resume home levothyroxine    10. Wheezing   - predominantly upper airways, suspect some slight reactive airways   - prn DuoNebs    Code status: DNR  DVT prophylaxis: heparin    Care Plan discussed with: Patient/Family and Nurse  Disposition: TBD     Hospital Problems  Date Reviewed: 4/22/2017          Codes Class Noted POA    * (Principal)Acute delirium ICD-10-CM: R41.0  ICD-9-CM: 780.09  4/22/2017 Yes                Review of Systems:   Review of systems not obtained due to patient factors. Vital Signs:    Last 24hrs VS reviewed since prior progress note. Most recent are:  Visit Vitals    /60 (BP 1 Location: Left arm, BP Patient Position: At rest;Lying right side)    Pulse 82    Temp 99 °F (37.2 °C)    Resp 21    Ht 5' 3\" (1.6 m)    Wt 97.9 kg (215 lb 12.8 oz)    SpO2 99%    Breastfeeding No    BMI 38.23 kg/m2       No intake or output data in the 24 hours ending 04/23/17 1452     Physical Examination:             Constitutional:  No acute distress, cooperative, mumbling words   ENT:  Oral mucous slightly dry, oropharynx benign. Neck supple    Resp:  Mainly upper airway wheeze, bases are clear to auscultation   CV:  Regular rhythm, normal rate, no murmurs, gallops, rubs    GI:  Soft, non distended, grimaces slightly on palpation of RUQ though no guarding.  normoactive bowel sounds, no hepatosplenomegaly     Musculoskeletal:  No edema, warm, 2+ pulses throughout    Neurologic:  Moves all extremities. AAOx1 (self only), otherwise, seemingly non-focal examination            Data Review:    Review and/or order of clinical lab test      Labs:     Recent Labs      04/23/17 0243 04/22/17   0336   WBC  16.3*  18.0*   HGB  9.1*  9.8*   HCT  28.6*  31.0*   PLT  303  285     Recent Labs      04/23/17   0243  04/22/17   0336  04/21/17   1557   NA  140  139  136   K  4.3  4.5  4.4   CL  107  105  101   CO2  28  26  25   BUN  37*  32*  28*   CREA  1.49*  1.80*  1.67*   GLU  132*  145*  178*   CA  8.6  9.1  9.4   MG   --   1.9   --    PHOS   --   3.0   --      Recent Labs      04/22/17 0336 04/21/17   1557   SGOT  17  23   ALT  22  25   AP  90  112   TBILI  0.6  0.7   TP  7.2  7.7   ALB  3.2*  3.7   GLOB  4.0  4.0   AML  73   --    LPSE  38*   --      No results for input(s): INR, PTP, APTT in the last 72 hours. No lab exists for component: INREXT, INREXT   No results for input(s): FE, TIBC, PSAT, FERR in the last 72 hours. No results found for: FOL, RBCF   No results for input(s): PH, PCO2, PO2 in the last 72 hours.   Recent Labs      04/22/17 0336 04/21/17   1557   CPK  72   --    CKNDX  Cannot be calulated   --    TROIQ  <0.04  <0.04     Lab Results   Component Value Date/Time    Cholesterol, total 142 01/28/2015 03:59 AM    HDL Cholesterol 54 01/28/2015 03:59 AM    LDL, calculated 76.4 01/28/2015 03:59 AM    Triglyceride 58 01/28/2015 03:59 AM    CHOL/HDL Ratio 2.6 01/28/2015 03:59 AM     Lab Results   Component Value Date/Time    Glucose (POC) 121 04/23/2017 11:46 AM    Glucose (POC) 122 04/23/2017 06:10 AM    Glucose (POC) 123 04/22/2017 11:58 PM    Glucose (POC) 146 04/22/2017 05:36 PM    Glucose (POC) 139 04/22/2017 11:35 AM     Lab Results   Component Value Date/Time    Color DARK YELLOW 04/21/2017 03:57 PM    Appearance CLOUDY 04/21/2017 03:57 PM    Specific gravity 1.024 04/21/2017 03:57 PM    pH (UA) 5.0 04/21/2017 03:57 PM    Protein 300 04/21/2017 03:57 PM    Glucose NEGATIVE  04/21/2017 03:57 PM    Ketone TRACE 04/21/2017 03:57 PM    Bilirubin NEGATIVE  01/27/2015 12:20 PM    Urobilinogen 0.2 04/21/2017 03:57 PM    Nitrites NEGATIVE  04/21/2017 03:57 PM    Leukocyte Esterase NEGATIVE  04/21/2017 03:57 PM    Epithelial cells FEW 04/21/2017 03:57 PM    Bacteria NEGATIVE  04/21/2017 03:57 PM    WBC 0-4 04/21/2017 03:57 PM    RBC 5-10 04/21/2017 03:57 PM         Medications Reviewed:     Current Facility-Administered Medications   Medication Dose Route Frequency    vancomycin (VANCOCIN) 1250 mg in  ml infusion  1,250 mg IntraVENous Q24H    acetaminophen (TYLENOL) tablet 500 mg  500 mg Oral Q8H PRN    amLODIPine (NORVASC) tablet 2.5 mg  2.5 mg Oral QHS    citalopram (CELEXA) tablet 20 mg  20 mg Oral DAILY    donepezil (ARICEPT) tablet 5 mg  5 mg Oral QHS    ergocalciferol (ERGOCALCIFEROL) capsule 100,000 Units  100,000 Units Oral EVERY MONTH    gabapentin (NEURONTIN) capsule 300 mg  300 mg Oral QHS    guaiFENesin (ROBITUSSIN) 100 mg/5 mL oral liquid 200 mg  200 mg Oral Q4H PRN    hydrALAZINE (APRESOLINE) tablet 50 mg  50 mg Oral TID    HYDROcodone-acetaminophen (NORCO) 5-325 mg per tablet 1 Tab  1 Tab Oral Q6H PRN    levothyroxine (SYNTHROID) tablet 100 mcg  100 mcg Oral ACB    magnesium oxide (MAG-OX) tablet 400 mg  400 mg Oral DAILY    polyethylene glycol (MIRALAX) packet 17 g  17 g Oral DAILY    simethicone (MYLICON) tablet 495 mg  160 mg Oral Q8H PRN    0.9% sodium chloride infusion  75 mL/hr IntraVENous CONTINUOUS    HYDROmorphone (PF) (DILAUDID) injection 0.5 mg  0.5 mg IntraVENous Q4H PRN    ondansetron (ZOFRAN) injection 4 mg  4 mg IntraVENous Q4H PRN    heparin (porcine) injection 5,000 Units  5,000 Units SubCUTAneous Q8H    albuterol-ipratropium (DUO-NEB) 2.5 MG-0.5 MG/3 ML  3 mL Nebulization Q4H PRN    metroNIDAZOLE (FLAGYL) IVPB premix 500 mg  500 mg IntraVENous Q8H    lactobac ac& pc-s.therm-b.anim (ARACELIS Q/RISAQUAD)  1 Cap Oral DAILY    glucose chewable tablet 16 g  4 Tab Oral PRN    dextrose (D50W) injection syrg 12.5-25 g  12.5-25 g IntraVENous PRN    glucagon (GLUCAGEN) injection 1 mg  1 mg IntraMUSCular PRN    insulin lispro (HUMALOG) injection   SubCUTAneous Q6H    levoFLOXacin (LEVAQUIN) 250 mg in D5W IVPB  250 mg IntraVENous Q24H    acetaminophen (TYLENOL) suppository 650 mg  650 mg Rectal Q6H PRN    Vancomycin dosing per pharmacy   Other Rx Dosing/Monitoring     ______________________________________________________________________  EXPECTED LENGTH OF STAY: - - -  ACTUAL LENGTH OF STAY:          [address] [facility], [place]

## 2017-04-23 NOTE — PROGRESS NOTES
Pharmacist Note - Vancomycin Dosing    Consult provided for this 80 y.o. female for indication of suspected acute cholecystitis. Recent Labs      17   0336  17   1557   WBC  18.0*  19.0*   CREA  1.80*  1.67*   BUN  32*  28*     Frequency of BMP: daily per protocol  Height: 160 cm  Weight: 99 kg  Est CrCl: ~26 ml/min based on an adjusted body weight as the patient weighs > 120% of IBW  Temp (24hrs), Av.7 °F (37.6 °C), Min:99.1 °F (37.3 °C), Max:100.3 °F (37.9 °C)    Cultures:   Blood:  gram positive cocci in clusters    Goal trough = 15 - 20 mcg/mL    Therapy will be initiated with a loading dose of 2000 mg IV x 1. A maintenance dose will not be scheduled at this time secondary to the patient's current renal insufficiency requiring at least a 36 hour dosing interval to avoid a supra-therapeutic trough. Recommend review of am labs on 2017 to help determine a dosing regimen or if the vancomycin should be dosed based on levels at this time. Pharmacy to follow patient daily and order levels / make dose adjustments as appropriate.

## 2017-04-23 NOTE — PROGRESS NOTES
Problem: Sepsis: Day 2  Goal: *Tolerating diet  Outcome: Not Progressing Towards Goal  NPO pending SLP eval d/t failed STAND.

## 2017-04-23 NOTE — ROUTINE PROCESS
Bedside shift change report given to Laila Ashley RN (oncoming nurse) by Tyler Sin RN (offgoing nurse). Report included the following information SBAR, ED Summary, MAR, Accordion, Recent Results and Med Rec Status.

## 2017-04-24 ENCOUNTER — APPOINTMENT (OUTPATIENT)
Dept: NUCLEAR MEDICINE | Age: 82
DRG: 871 | End: 2017-04-24
Attending: INTERNAL MEDICINE
Payer: COMMERCIAL

## 2017-04-24 ENCOUNTER — APPOINTMENT (OUTPATIENT)
Dept: INTERVENTIONAL RADIOLOGY/VASCULAR | Age: 82
DRG: 871 | End: 2017-04-24
Attending: SURGERY
Payer: COMMERCIAL

## 2017-04-24 ENCOUNTER — APPOINTMENT (OUTPATIENT)
Dept: INTERVENTIONAL RADIOLOGY/VASCULAR | Age: 82
DRG: 871 | End: 2017-04-24
Attending: HOSPITALIST
Payer: COMMERCIAL

## 2017-04-24 ENCOUNTER — APPOINTMENT (OUTPATIENT)
Dept: GENERAL RADIOLOGY | Age: 82
DRG: 871 | End: 2017-04-24
Attending: RADIOLOGY
Payer: COMMERCIAL

## 2017-04-24 ENCOUNTER — APPOINTMENT (OUTPATIENT)
Dept: CT IMAGING | Age: 82
DRG: 871 | End: 2017-04-24
Attending: SURGERY
Payer: COMMERCIAL

## 2017-04-24 LAB
ANION GAP BLD CALC-SCNC: 8 MMOL/L (ref 5–15)
APTT PPP: 34.5 SEC (ref 22.1–32.5)
BASOPHILS # BLD AUTO: 0 K/UL (ref 0–0.1)
BASOPHILS # BLD: 0 % (ref 0–1)
BUN SERPL-MCNC: 32 MG/DL (ref 6–20)
BUN/CREAT SERPL: 29 (ref 12–20)
CALCIUM SERPL-MCNC: 8.3 MG/DL (ref 8.5–10.1)
CHLORIDE SERPL-SCNC: 111 MMOL/L (ref 97–108)
CO2 SERPL-SCNC: 25 MMOL/L (ref 21–32)
CREAT SERPL-MCNC: 1.11 MG/DL (ref 0.55–1.02)
EOSINOPHIL # BLD: 0 K/UL (ref 0–0.4)
EOSINOPHIL NFR BLD: 0 % (ref 0–7)
ERYTHROCYTE [DISTWIDTH] IN BLOOD BY AUTOMATED COUNT: 15.5 % (ref 11.5–14.5)
GLUCOSE BLD STRIP.AUTO-MCNC: 139 MG/DL (ref 65–100)
GLUCOSE BLD STRIP.AUTO-MCNC: 149 MG/DL (ref 65–100)
GLUCOSE SERPL-MCNC: 149 MG/DL (ref 65–100)
HCT VFR BLD AUTO: 29.5 % (ref 35–47)
HGB BLD-MCNC: 9.2 G/DL (ref 11.5–16)
INR PPP: 1.2 (ref 0.9–1.1)
LYMPHOCYTES # BLD AUTO: 8 % (ref 12–49)
LYMPHOCYTES # BLD: 1.1 K/UL (ref 0.8–3.5)
MCH RBC QN AUTO: 24.7 PG (ref 26–34)
MCHC RBC AUTO-ENTMCNC: 31.2 G/DL (ref 30–36.5)
MCV RBC AUTO: 79.3 FL (ref 80–99)
MONOCYTES # BLD: 1.3 K/UL (ref 0–1)
MONOCYTES NFR BLD AUTO: 9 % (ref 5–13)
NEUTS SEG # BLD: 12 K/UL (ref 1.8–8)
NEUTS SEG NFR BLD AUTO: 83 % (ref 32–75)
PLATELET # BLD AUTO: 305 K/UL (ref 150–400)
POTASSIUM SERPL-SCNC: 3.9 MMOL/L (ref 3.5–5.1)
PROTHROMBIN TIME: 11.9 SEC (ref 9–11.1)
RBC # BLD AUTO: 3.72 M/UL (ref 3.8–5.2)
SERVICE CMNT-IMP: ABNORMAL
SERVICE CMNT-IMP: ABNORMAL
SODIUM SERPL-SCNC: 144 MMOL/L (ref 136–145)
THERAPEUTIC RANGE,PTTT: ABNORMAL SECS (ref 58–77)
WBC # BLD AUTO: 14.5 K/UL (ref 3.6–11)

## 2017-04-24 PROCEDURE — 74150 CT ABDOMEN W/O CONTRAST: CPT

## 2017-04-24 PROCEDURE — 94640 AIRWAY INHALATION TREATMENT: CPT

## 2017-04-24 PROCEDURE — 36556 INSERT NON-TUNNEL CV CATH: CPT

## 2017-04-24 PROCEDURE — 36415 COLL VENOUS BLD VENIPUNCTURE: CPT | Performed by: STUDENT IN AN ORGANIZED HEALTH CARE EDUCATION/TRAINING PROGRAM

## 2017-04-24 PROCEDURE — 74011250636 HC RX REV CODE- 250/636: Performed by: RADIOLOGY

## 2017-04-24 PROCEDURE — A9537 TC99M MEBROFENIN: HCPCS

## 2017-04-24 PROCEDURE — 65660000000 HC RM CCU STEPDOWN

## 2017-04-24 PROCEDURE — 85025 COMPLETE CBC W/AUTO DIFF WBC: CPT | Performed by: STUDENT IN AN ORGANIZED HEALTH CARE EDUCATION/TRAINING PROGRAM

## 2017-04-24 PROCEDURE — 82962 GLUCOSE BLOOD TEST: CPT

## 2017-04-24 PROCEDURE — 85730 THROMBOPLASTIN TIME PARTIAL: CPT | Performed by: STUDENT IN AN ORGANIZED HEALTH CARE EDUCATION/TRAINING PROGRAM

## 2017-04-24 PROCEDURE — 74011250636 HC RX REV CODE- 250/636: Performed by: STUDENT IN AN ORGANIZED HEALTH CARE EDUCATION/TRAINING PROGRAM

## 2017-04-24 PROCEDURE — 74011000250 HC RX REV CODE- 250: Performed by: INTERNAL MEDICINE

## 2017-04-24 PROCEDURE — 74011250636 HC RX REV CODE- 250/636: Performed by: INTERNAL MEDICINE

## 2017-04-24 PROCEDURE — 80048 BASIC METABOLIC PNL TOTAL CA: CPT | Performed by: STUDENT IN AN ORGANIZED HEALTH CARE EDUCATION/TRAINING PROGRAM

## 2017-04-24 PROCEDURE — 02HV33Z INSERTION OF INFUSION DEVICE INTO SUPERIOR VENA CAVA, PERCUTANEOUS APPROACH: ICD-10-PCS | Performed by: RADIOLOGY

## 2017-04-24 PROCEDURE — 71010 XR CHEST PORT: CPT

## 2017-04-24 PROCEDURE — 87040 BLOOD CULTURE FOR BACTERIA: CPT | Performed by: STUDENT IN AN ORGANIZED HEALTH CARE EDUCATION/TRAINING PROGRAM

## 2017-04-24 PROCEDURE — 85610 PROTHROMBIN TIME: CPT | Performed by: STUDENT IN AN ORGANIZED HEALTH CARE EDUCATION/TRAINING PROGRAM

## 2017-04-24 RX ORDER — SODIUM CHLORIDE 0.9 % (FLUSH) 0.9 %
SYRINGE (ML) INJECTION
Status: COMPLETED
Start: 2017-04-24 | End: 2017-04-24

## 2017-04-24 RX ORDER — LIDOCAINE HYDROCHLORIDE 10 MG/ML
INJECTION INFILTRATION; PERINEURAL
Status: DISPENSED
Start: 2017-04-24 | End: 2017-04-25

## 2017-04-24 RX ORDER — MORPHINE SULFATE 2 MG/ML
2 INJECTION, SOLUTION INTRAMUSCULAR; INTRAVENOUS
Status: COMPLETED | OUTPATIENT
Start: 2017-04-24 | End: 2017-04-24

## 2017-04-24 RX ORDER — SODIUM CHLORIDE 0.9 % (FLUSH) 0.9 %
5 SYRINGE (ML) INJECTION AS NEEDED
Status: DISCONTINUED | OUTPATIENT
Start: 2017-04-24 | End: 2017-04-28 | Stop reason: HOSPADM

## 2017-04-24 RX ORDER — SODIUM CHLORIDE 0.9 % (FLUSH) 0.9 %
5 SYRINGE (ML) INJECTION EVERY 8 HOURS
Status: DISCONTINUED | OUTPATIENT
Start: 2017-04-24 | End: 2017-04-28 | Stop reason: HOSPADM

## 2017-04-24 RX ORDER — FENTANYL CITRATE 50 UG/ML
200 INJECTION, SOLUTION INTRAMUSCULAR; INTRAVENOUS
Status: DISCONTINUED | OUTPATIENT
Start: 2017-04-24 | End: 2017-04-24

## 2017-04-24 RX ADMIN — METRONIDAZOLE 500 MG: 500 INJECTION, SOLUTION INTRAVENOUS at 12:00

## 2017-04-24 RX ADMIN — METRONIDAZOLE 500 MG: 500 INJECTION, SOLUTION INTRAVENOUS at 21:46

## 2017-04-24 RX ADMIN — Medication 5 ML: at 21:49

## 2017-04-24 RX ADMIN — Medication 2 MG: at 10:09

## 2017-04-24 RX ADMIN — METRONIDAZOLE 500 MG: 500 INJECTION, SOLUTION INTRAVENOUS at 04:37

## 2017-04-24 RX ADMIN — Medication 10 ML: at 10:09

## 2017-04-24 RX ADMIN — HEPARIN SODIUM 5000 UNITS: 5000 INJECTION, SOLUTION INTRAVENOUS; SUBCUTANEOUS at 21:47

## 2017-04-24 RX ADMIN — LEVOFLOXACIN 250 MG: 5 INJECTION, SOLUTION INTRAVENOUS at 21:46

## 2017-04-24 RX ADMIN — FENTANYL CITRATE 25 MCG: 50 INJECTION, SOLUTION INTRAMUSCULAR; INTRAVENOUS at 15:28

## 2017-04-24 RX ADMIN — HEPARIN SODIUM 5000 UNITS: 5000 INJECTION, SOLUTION INTRAVENOUS; SUBCUTANEOUS at 04:37

## 2017-04-24 RX ADMIN — SODIUM CHLORIDE 75 ML/HR: 900 INJECTION, SOLUTION INTRAVENOUS at 12:41

## 2017-04-24 RX ADMIN — IPRATROPIUM BROMIDE AND ALBUTEROL SULFATE 3 ML: .5; 3 SOLUTION RESPIRATORY (INHALATION) at 13:54

## 2017-04-24 NOTE — PROGRESS NOTES
Bedside shift change report given to [address] [place] [address] Street (oncoming nurse) by Miguel Angel Murrieta RN (offgoing nurse). Report included the following information SBAR, Kardex, Intake/Output, MAR, Recent Results and Cardiac Rhythm SR 80s.

## 2017-04-24 NOTE — PROGRESS NOTES
Hospitalist Progress Note  Aidee Chavez NP  Office: [phone]  Cell: [phone]      Date of Service:  2017  NAME:  Apoorva Carter  :  3/8/1933  MRN:  [id number]      Admission Summary:    80year old female with history of prior CVA with residual right-sided weakness, HTN, DM-2, dementia, chronic diastolic heart failure, hypothyroidism presented on 17 with altered mental status and poorly characterized right upper quadrant abdominal pain. She was noted to have findings concerning for acute cholecystitis on CT Abd/Pelvis. Interval history / Subjective:   No issues overnight. WBC trending down but HIDA this am clearly shows abnormality. Attempted to get cholecystostomy tube today unsuccessfully so weighing options for next steps. She seems to be comfortable and denies pain. Assessment & Plan:     1. Delirium, acute metabolic encephalopathy   - suspect secondary to sepsis in setting of possible acute cholecystitis   - CT head  - Chronic microvascular ischemic change. No acute intracranial process. - MRI brain  - No evidence for acute infarction. Mild to moderate bilateral subcortical and periventricular increased T2/FLAIR signal abnormalities are nonspecific but may represent changes of chronic small  vessel ischemic disease.   - treating underlying conditions as below   - Family reports some improvement towards her baseline    2. Suspected sepsis secondary to acute cholecystitis   - with tachycardia, leukocytosis upon admission   - CT Abd/Pelvis  - Findings are suggestive of acute cholecystitis. - Abd U/S  - Distended gallbladder with gallstones; no biliary ductal dilatation. No right hydronephrosis.    - blood cultures  - gram positive cocci in clusters in 1/2 bottles drawn - coagulase negative Staphylococcus   - on empiric levofloxacin, Flagyl, added vancomycin due to GPCs in clusters as above, however, this is likely contaminant   - F/U final blood cultures   - repeat blood cultures in AM done and pending   - HIDA on 4/24 with cystic duct obstruction and possible acute cholecystitis   - Attempted to place cholecystostomy tube on 4/24 and unable to do so. Surgery notified    3. Possible STACEY   - unclear baseline Cr, possibly some underlying CKD, Cr elevated to 1.80 upon admission, suspect prerenal azotemia and sepsis contributing, seemingly improving   - IV fluid hydration    4. Prior CVA    - holding home Plavix in setting of possible need for intervention     5. Chronic diastolic heart failure   - does not currently appear in acute exacerbation of this diagnosis   - unclear baseline NYHA classification, unable to determine at this juncture given her mentation   - on hydralazine    6. Dementia   - continue home Aricept, Celexa    7. DM-2   - sliding scale insulin coverage   - continue home gabapentin    8. HTN   - resume home amlodipine, hydralazine    9. Hypothyroidism   - resume home levothyroxine    10. Wheezing   - predominantly upper airways, suspect some slight reactive airways   - prn DuoNebs    Code status: DNR  DVT prophylaxis: heparin    Care Plan discussed with: Patient/Family and Nurse. Son updated at bedside on 4/24  Disposition: TBD     Hospital Problems  Date Reviewed: 4/22/2017          Codes Class Noted POA    * (Principal)Acute delirium ICD-10-CM: R41.0  ICD-9-CM: 780.09  4/22/2017 Yes                Review of Systems:   Review of systems not obtained due to patient factors. Vital Signs:    Last 24hrs VS reviewed since prior progress note.  Most recent are:  Visit Vitals    /75    Pulse 86    Temp 99.6 °F (37.6 °C)    Resp 18    Ht 5' 3\" (1.6 m)    Wt 97.2 kg (214 lb 4.6 oz)    SpO2 97%    Breastfeeding No    BMI 37.96 kg/m2         Intake/Output Summary (Last 24 hours) at 04/24/17 9638  Last data filed at 04/24/17 0732   Gross per 24 hour   Intake 947 ml   Output             1000 ml   Net              -53 ml        Physical Examination:             Constitutional:  No acute distress, cooperative, mumbling words   ENT:  Oral mucous slightly dry, oropharynx benign. Neck supple    Resp:  Overall fairly CTAB   CV:  Regular rhythm, normal rate, no murmurs, gallops, rubs    GI:  Soft, non distended, no grimacing with deep palpation normoactive bowel sounds, no hepatosplenomegaly     Musculoskeletal:  No edema, warm, 2+ pulses throughout    Neurologic:  Moves all extremities. AAOx1 (self only), otherwise, seemingly non-focal examination            Data Review:    Review and/or order of clinical lab test on 4/24      Labs:     Recent Labs      04/24/17 0137 04/23/17 0243   WBC  14.5*  16.3*   HGB  9.2*  9.1*   HCT  29.5*  28.6*   PLT  305  303     Recent Labs      04/24/17 0137 04/23/17 0243 04/22/17 0336   NA  144  140  139   K  3.9  4.3  4.5   CL  111*  107  105   CO2  25  28  26   BUN  32*  37*  32*   CREA  1.11*  1.49*  1.80*   GLU  149*  132*  145*   CA  8.3*  8.6  9.1   MG   --    --   1.9   PHOS   --    --   3.0     Recent Labs      04/22/17 0336   SGOT  17   ALT  22   AP  90   TBILI  0.6   TP  7.2   ALB  3.2*   GLOB  4.0   AML  73   LPSE  38*     Recent Labs      04/24/17 0137   INR  1.2*   PTP  11.9*   APTT  34.5*      No results for input(s): FE, TIBC, PSAT, FERR in the last 72 hours. No results found for: FOL, RBCF   No results for input(s): PH, PCO2, PO2 in the last 72 hours.   Recent Labs      04/22/17 0336   CPK  72   CKNDX  Cannot be calulated   TROIQ  <0.04     Lab Results   Component Value Date/Time    Cholesterol, total 142 01/28/2015 03:59 AM    HDL Cholesterol 54 01/28/2015 03:59 AM    LDL, calculated 76.4 01/28/2015 03:59 AM    Triglyceride 58 01/28/2015 03:59 AM    CHOL/HDL Ratio 2.6 01/28/2015 03:59 AM     Lab Results   Component Value Date/Time    Glucose (POC) 139 04/24/2017 12:03 PM    Glucose (POC) 149 04/24/2017 07:28 AM    Glucose (POC) 129 04/23/2017 11:18 PM    Glucose (POC) 105 04/23/2017 06:03 PM    Glucose (POC) 121 04/23/2017 11:46 AM     Lab Results   Component Value Date/Time    Color DARK YELLOW 04/21/2017 03:57 PM    Appearance CLOUDY 04/21/2017 03:57 PM    Specific gravity 1.024 04/21/2017 03:57 PM    pH (UA) 5.0 04/21/2017 03:57 PM    Protein 300 04/21/2017 03:57 PM    Glucose NEGATIVE  04/21/2017 03:57 PM    Ketone TRACE 04/21/2017 03:57 PM    Bilirubin NEGATIVE  01/27/2015 12:20 PM    Urobilinogen 0.2 04/21/2017 03:57 PM    Nitrites NEGATIVE  04/21/2017 03:57 PM    Leukocyte Esterase NEGATIVE  04/21/2017 03:57 PM    Epithelial cells FEW 04/21/2017 03:57 PM    Bacteria NEGATIVE  04/21/2017 03:57 PM    WBC 0-4 04/21/2017 03:57 PM    RBC 5-10 04/21/2017 03:57 PM         Medications Reviewed:     Current Facility-Administered Medications   Medication Dose Route Frequency    lidocaine (XYLOCAINE) 10 mg/mL (1 %) injection        sodium chloride (NS) flush 5 mL  5 mL InterCATHeter PRN    sodium chloride (NS) flush 5 mL  5 mL InterCATHeter Q8H    acetaminophen (TYLENOL) tablet 500 mg  500 mg Oral Q8H PRN    amLODIPine (NORVASC) tablet 2.5 mg  2.5 mg Oral QHS    citalopram (CELEXA) tablet 20 mg  20 mg Oral DAILY    donepezil (ARICEPT) tablet 5 mg  5 mg Oral QHS    ergocalciferol (ERGOCALCIFEROL) capsule 100,000 Units  100,000 Units Oral EVERY MONTH    gabapentin (NEURONTIN) capsule 300 mg  300 mg Oral QHS    guaiFENesin (ROBITUSSIN) 100 mg/5 mL oral liquid 200 mg  200 mg Oral Q4H PRN    hydrALAZINE (APRESOLINE) tablet 50 mg  50 mg Oral TID    HYDROcodone-acetaminophen (NORCO) 5-325 mg per tablet 1 Tab  1 Tab Oral Q6H PRN    levothyroxine (SYNTHROID) tablet 100 mcg  100 mcg Oral ACB    magnesium oxide (MAG-OX) tablet 400 mg  400 mg Oral DAILY    polyethylene glycol (MIRALAX) packet 17 g  17 g Oral DAILY    simethicone (MYLICON) tablet 019 mg  160 mg Oral Q8H PRN    0.9% sodium chloride infusion  75 mL/hr IntraVENous CONTINUOUS    HYDROmorphone (PF) (DILAUDID) injection 0.5 mg  0.5 mg IntraVENous Q4H PRN    ondansetron (ZOFRAN) injection 4 mg  4 mg IntraVENous Q4H PRN    heparin (porcine) injection 5,000 Units  5,000 Units SubCUTAneous Q8H    albuterol-ipratropium (DUO-NEB) 2.5 MG-0.5 MG/3 ML  3 mL Nebulization Q4H PRN    metroNIDAZOLE (FLAGYL) IVPB premix 500 mg  500 mg IntraVENous Q8H    lactobac ac& pc-s.therm-b.anim (ARACELIS Q/RISAQUAD)  1 Cap Oral DAILY    glucose chewable tablet 16 g  4 Tab Oral PRN    dextrose (D50W) injection syrg 12.5-25 g  12.5-25 g IntraVENous PRN    glucagon (GLUCAGEN) injection 1 mg  1 mg IntraMUSCular PRN    insulin lispro (HUMALOG) injection   SubCUTAneous Q6H    levoFLOXacin (LEVAQUIN) 250 mg in D5W IVPB  250 mg IntraVENous Q24H    acetaminophen (TYLENOL) suppository 650 mg  650 mg Rectal Q6H PRN     ______________________________________________________________________  EXPECTED LENGTH OF STAY: 4d 21h  ACTUAL LENGTH OF STAY:          2                 Ivett Toledo NP

## 2017-04-24 NOTE — ROUTINE PROCESS
TRANSFER - OUT REPORT:    Verbal report given to James(name) on [place] Me[place]  being transferred to [facility](unit) for routine progression of care       Report consisted of patients Situation, Background, Assessment and   Recommendations(SBAR). Information from the following report(s) SBAR, Procedure Summary and MAR was reviewed with the receiving nurse. Lines:   Venous Access Device central line 04/24/17 Upper chest (subclavicular area, right (Active)   Central Line Being Utilized Yes 4/24/2017  2:50 PM   Criteria for Appropriate Use Limited/no vessel suitable for conventional peripheral access 4/24/2017  2:50 PM   Site Assessment Clean, dry, & intact 4/24/2017  2:50 PM   Date of Last Dressing Change 04/24/17 4/24/2017  2:50 PM   Dressing Status Clean, dry, & intact 4/24/2017  2:50 PM   Dressing Type Transparent 4/24/2017  2:50 PM   Positive Blood Return (Medial Site) Yes 4/24/2017  2:50 PM   Action Taken (Medial Site) Flushed 4/24/2017  2:50 PM       Peripheral IV 04/21/17 Right Forearm (Active)   Site Assessment Clean, dry, & intact 4/24/2017  8:00 AM   Phlebitis Assessment 0 4/24/2017  8:00 AM   Infiltration Assessment 0 4/24/2017  8:00 AM   Dressing Status Clean, dry, & intact 4/24/2017  8:00 AM   Dressing Type Transparent 4/24/2017  8:00 AM   Hub Color/Line Status Blue; Infusing 4/24/2017  4:00 AM   Action Taken Open ports on tubing capped 4/24/2017  4:00 AM   Alcohol Cap Used Yes 4/24/2017  4:00 AM        Opportunity for questions and clarification was provided.       Patient transported with:   [employer]

## 2017-04-24 NOTE — PROGRESS NOTES
General Surgery Daily Progress Note    Admit Date: 2017  Post-Operative Day: * No surgery found * from * No surgery found *     Subjective:     Last 24 hrs: Pt is lying in bed. Non-verbal but appears to nod her head 'no\" when asked if she is having abd pain   WBC trending down; tmax 99.3, on vanc, levaquiny. Objective:     Blood pressure 151/75, pulse 82, temperature 98.8 °F (37.1 °C), resp. rate 22, height 5' 3\" (1.6 m), weight 214 lb 4.6 oz (97.2 kg), SpO2 98 %, not currently breastfeeding. Temp (24hrs), Av.1 °F (37.3 °C), Min:98.8 °F (37.1 °C), Max:99.3 °F (37.4 °C)      _____________________  Physical Exam:     Alert, in no acute distress.   Cardiovascular: RRR, no peripheral edema  Lungs:CTAB anteriorally   Abdomen: soft, nl BS, TTP (deep) RUQ      Assessment:   Principal Problem:    Acute delirium (2017)            Plan:     Cholecystostomy drain placement today  Cont abx  dvt ppx  Cbc in am    Data Review:    Recent Labs      17   0137  17   0243  17   0336   WBC  14.5*  16.3*  18.0*   HGB  9.2*  9.1*  9.8*   HCT  29.5*  28.6*  31.0*   PLT  305  303  285     Recent Labs      17   0137  17   0243  17   0336  17   1557   NA  144  140  139  136   K  3.9  4.3  4.5  4.4   CL  111*  107  105  101   CO2  25  28  26  25   GLU  149*  132*  145*  178*   BUN  32*  37*  32*  28*   CREA  1.11*  1.49*  1.80*  1.67*   CA  8.3*  8.6  9.1  9.4   MG   --    --   1.9   --    PHOS   --    --   3.0   --    ALB   --    --   3.2*  3.7   SGOT   --    --   17  23   ALT   --    --      INR  1.2*   --    --    --      Recent Labs      17   0336   AML  73   LPSE  38*           ______________________  Medications:    Current Facility-Administered Medications   Medication Dose Route Frequency    acetaminophen (TYLENOL) tablet 500 mg  500 mg Oral Q8H PRN    amLODIPine (NORVASC) tablet 2.5 mg  2.5 mg Oral QHS    citalopram (CELEXA) tablet 20 mg  20 mg Oral DAILY    donepezil (ARICEPT) tablet 5 mg  5 mg Oral QHS    ergocalciferol (ERGOCALCIFEROL) capsule 100,000 Units  100,000 Units Oral EVERY MONTH    gabapentin (NEURONTIN) capsule 300 mg  300 mg Oral QHS    guaiFENesin (ROBITUSSIN) 100 mg/5 mL oral liquid 200 mg  200 mg Oral Q4H PRN    hydrALAZINE (APRESOLINE) tablet 50 mg  50 mg Oral TID    HYDROcodone-acetaminophen (NORCO) 5-325 mg per tablet 1 Tab  1 Tab Oral Q6H PRN    levothyroxine (SYNTHROID) tablet 100 mcg  100 mcg Oral ACB    magnesium oxide (MAG-OX) tablet 400 mg  400 mg Oral DAILY    polyethylene glycol (MIRALAX) packet 17 g  17 g Oral DAILY    simethicone (MYLICON) tablet 272 mg  160 mg Oral Q8H PRN    0.9% sodium chloride infusion  75 mL/hr IntraVENous CONTINUOUS    HYDROmorphone (PF) (DILAUDID) injection 0.5 mg  0.5 mg IntraVENous Q4H PRN    ondansetron (ZOFRAN) injection 4 mg  4 mg IntraVENous Q4H PRN    heparin (porcine) injection 5,000 Units  5,000 Units SubCUTAneous Q8H    albuterol-ipratropium (DUO-NEB) 2.5 MG-0.5 MG/3 ML  3 mL Nebulization Q4H PRN    metroNIDAZOLE (FLAGYL) IVPB premix 500 mg  500 mg IntraVENous Q8H    lactobac ac& pc-s.therm-b.anim (ARACELIS Q/RISAQUAD)  1 Cap Oral DAILY    glucose chewable tablet 16 g  4 Tab Oral PRN    dextrose (D50W) injection syrg 12.5-25 g  12.5-25 g IntraVENous PRN    glucagon (GLUCAGEN) injection 1 mg  1 mg IntraMUSCular PRN    insulin lispro (HUMALOG) injection   SubCUTAneous Q6H    levoFLOXacin (LEVAQUIN) 250 mg in D5W IVPB  250 mg IntraVENous Q24H    acetaminophen (TYLENOL) suppository 650 mg  650 mg Rectal Q6H PRN       Liddie Ing Ettie Scheuermann) Mohsen Pinedo NP  4/24/2017

## 2017-04-24 NOTE — PROGRESS NOTES
Consult received and appreciated, however order entered per protocol, which is a nursing order. SLP requires a physician's order to complete swallowing evaluation. If formal evaluation is desired, please re-consult with MD order. Thanks. Shelby Duverney, M.CD.  CCC-SLP

## 2017-04-24 NOTE — PROGRESS NOTES
IR-Unable to perform cholecystostomy due to anatomic limitations including limited mobility of right arm and position of right colon overlying GB.-IMER.

## 2017-04-24 NOTE — PROGRESS NOTES
Care Management: patient has [employer] / [employer]: reaching out to  / Derick Pennington / [phone] ( for [employer]) to give update on patient. Left message on voice mail to call writer back. Fax number for updates is : [phone]. Will follow up and discuss first with [address]. 3 [facility] still reviewing.     Care Management Interventions  PCP Verified by CM: No ([facility] doctors)  Mode of Transport at Discharge: BLS  Transition of [address] (CM Consult): [address] ([facility])  Franchesca Pollock: No  Discharge Durable Medical Equipment: No  Physical Therapy Consult: No  Occupational Therapy Consult: No  Speech Therapy Consult: Yes  Current Support Network: [address] [place] [address] ([facility]  Confirm Follow Up Transport: Other (see comment)  Discharge Location  Discharge Placement: [facility] ([facility]      Donna Sin RN BSN CM

## 2017-04-24 NOTE — PROGRESS NOTES
Son, Garett Hsieh, called and updated that although central line is in, unable to access gallbladder as right arm is contracted and unable to get right arm up over head.

## 2017-04-24 NOTE — PROGRESS NOTES
New orders received and case discussed with nsg who reports coughing with liquids over the weekend. Attempted to see for swallowing evaluation, however patient NPO for procedure this morning, and now off the floor for drain placement. SLP to follow up tomorrow once cleared from surgery standpoint for PO intake. If patient should be cleared for PO intake this evening, recommend complete nsg dysphagia screen prior to any intake. Thanks. Imani Dominguez M.CD.  CCC-SLP

## 2017-04-24 NOTE — PROGRESS NOTES
Progress Note    Patient: Kaylene Galindo MRN: [id number]  SSN: [id number]    YOB: 1933  Age: 80 y.o. Sex: female      Admit Date: 2017    Abdominal pain    Subjective:     No acute surgical issues. Pt still with significant abdominal pain. Leukocytosis persists but is slightly improving. Objective:     Visit Vitals    /54 (BP 1 Location: Left arm, BP Patient Position: At rest;Head of bed elevated (Comment degrees))    Pulse 82    Temp 99 °F (37.2 °C)    Resp 21    Ht 5' 3\" (1.6 m)    Wt 215 lb 12.8 oz (97.9 kg)    SpO2 99%    Breastfeeding No    BMI 38.23 kg/m2       Temp (24hrs), Av.3 °F (37.4 °C), Min:99 °F (37.2 °C), Max:99.8 °F (37.7 °C)        Physical Exam:    Gen:  NAD  Pulm:  Unlabored  Abd:  S/mildly distended/moderate TTP    Recent Results (from the past 24 hour(s))   GLUCOSE, POC    Collection Time: 17 11:58 PM   Result Value Ref Range    Glucose (POC) 123 (H) 65 - 100 mg/dL    Performed by Edu Green    CBC WITH AUTOMATED DIFF    Collection Time: 17  2:43 AM   Result Value Ref Range    WBC 16.3 (H) 3.6 - 11.0 K/uL    RBC 3.60 (L) 3.80 - 5.20 M/uL    HGB 9.1 (L) 11.5 - 16.0 g/dL    HCT 28.6 (L) 35.0 - 47.0 %    MCV 79.4 (L) 80.0 - 99.0 FL    MCH 25.3 (L) 26.0 - 34.0 PG    MCHC 31.8 30.0 - 36.5 g/dL    RDW 15.4 (H) 11.5 - 14.5 %    PLATELET 250 279 - 634 K/uL    NEUTROPHILS 84 (H) 32 - 75 %    LYMPHOCYTES 9 (L) 12 - 49 %    MONOCYTES 7 5 - 13 %    EOSINOPHILS 0 0 - 7 %    BASOPHILS 0 0 - 1 %    ABS. NEUTROPHILS 13.8 (H) 1.8 - 8.0 K/UL    ABS. LYMPHOCYTES 1.4 0.8 - 3.5 K/UL    ABS. MONOCYTES 1.1 (H) 0.0 - 1.0 K/UL    ABS. EOSINOPHILS 0.0 0.0 - 0.4 K/UL    ABS.  BASOPHILS 0.0 0.0 - 0.1 K/UL   METABOLIC PANEL, BASIC    Collection Time: 17  2:43 AM   Result Value Ref Range    Sodium 140 136 - 145 mmol/L    Potassium 4.3 3.5 - 5.1 mmol/L    Chloride 107 97 - 108 mmol/L    CO2 28 21 - 32 mmol/L    Anion gap 5 5 - 15 mmol/L    Glucose 132 (H) 65 - 100 mg/dL    BUN 37 (H) 6 - 20 MG/DL    Creatinine 1.49 (H) 0.55 - 1.02 MG/DL    BUN/Creatinine ratio 25 (H) 12 - 20      GFR est AA 40 (L) >60 ml/min/1.73m2    GFR est non-AA 33 (L) >60 ml/min/1.73m2    Calcium 8.6 8.5 - 10.1 MG/DL   GLUCOSE, POC    Collection Time: 04/23/17  6:10 AM   Result Value Ref Range    Glucose (POC) 122 (H) 65 - 100 mg/dL    Performed by Carrington Anthony    GLUCOSE, POC    Collection Time: 04/23/17 11:46 AM   Result Value Ref Range    Glucose (POC) 121 (H) 65 - 100 mg/dL    Performed by Mitchell Padilla    GLUCOSE, POC    Collection Time: 04/23/17  6:03 PM   Result Value Ref Range    Glucose (POC) 105 (H) 65 - 100 mg/dL    Performed by Rodríguez Freeman I      Assessment:     Hospital Problems  Date Reviewed: 4/22/2017          Codes Class Noted POA    * (Principal)Acute delirium ICD-10-CM: R41.0  ICD-9-CM: 780.09  4/22/2017 Yes              Plan/Recommendations/Medical Decision Making:     - Continue IV antibiotic  - Will order percutaneous cholecystostomy tube placement for am  - NPO with ice chips    Signed By: Олег Torres MD     April 23, 2017

## 2017-04-24 NOTE — PROGRESS NOTES
Daughter noted to have filled up a mug with ice water to take to pt. Discussed NPO status yesterday when RN wrote same on pt's communication board (\"nothing to eat or drink by mouth\") and RN reiterated same at this time. Daughter reports she will just moisten pt's mouth. RN later saw daughter using saturated sponge to drop water into pt's mouth. Reminded that pt failed STAND and needs to remain NPO for safety, as she is at risk for aspiration. Daughter verbalized understanding and stated she would stop. RN also noted juice cup and can of pop in pt's room that were not previously present.

## 2017-04-24 NOTE — INTERDISCIPLINARY ROUNDS
IDR/SLIDR Summary          Patient: Eduardo Lux MRN: [id number]    Age: 80 y.o. YOB: 1933 Room/Bed: [facility]   Admit Diagnosis: Acute delirium  Principal Diagnosis: Acute delirium   Goals: Safety, HIDA scan  Readmission: NO  Quality Measure: SEPSIS  VTE Prophylaxis: Chemical  Influenza Vaccine screening completed? YES  Pneumococcal Vaccine screening completed? YES  Mobility needs: Yes   Nutrition plan:Yes  Consults:P.T, O.T., Speech, Respiratory and Case Management    Financial concerns:No  Escalated to CM? YES  RRAT Score: 12   Interventions:  Testing due for pt today?  YES  LOS: 2 days Expected length of stay >2 days  Discharge plan: TBD   PCP: PROVIDER UNKNOWN  Transportation needs: Yes    Days before discharge:two or more days before discharge   Discharge disposition: Nursing Home    Signed:     Nitish Castro RN  4/24/2017  12:11 AM

## 2017-04-24 NOTE — PROCEDURES
PROCEDURE:CVL placement at bedside. INDICATION:no venous access. ANESTHESIA:local.  COMPLICATION:NONE. SPECIMENS REMOVED:none. BLOOD LOSS:NONE. /ASSISTANT:YARON Sinha RECOMMENDATIONS:may use. CONSENT OBTAINED:YES.  NOTES:none.

## 2017-04-24 NOTE — CDMP QUERY
Patient is noted to have a BMI of 37.96. Please clarify if this patient is:     =>Morbidly obese  =>Obese   =>Overweight  =>Other explanation of clinical findings  =>Unable to determine (no explanation for clinical findings)    Presentation:BMI: 37.96 5'3\" 214LB  REFERENCE:  The [address] [place] [address] has issued a statement indicating that, \"Individuals who are overweight, obese, or morbidly obese are at an increased risk for certain medical conditions when compared to persons of normal weight. Therefore, these conditions are always clinically significant and reportable when documented by the provider. Please clarify and document your clinical opinion in the progress notes and discharge summary, including the definitive and or presumptive diagnosis, (suspected or probable), related to the above clinical findings. Please include clinical findings supporting your diagnosis.      Thank Shavonne Burton [facility]  [phone]

## 2017-04-24 NOTE — PROGRESS NOTES
Attempted new IV access x2, right arm with PIV but swollen with crepitus felt around site. Bedside RN, Joel Paz, called and asked to consult with MD for IV access.

## 2017-04-25 LAB
ALBUMIN SERPL BCP-MCNC: 2.8 G/DL (ref 3.5–5)
ALBUMIN/GLOB SERPL: 0.8 {RATIO} (ref 1.1–2.2)
ALP SERPL-CCNC: 85 U/L (ref 45–117)
ALT SERPL-CCNC: 17 U/L (ref 12–78)
ANION GAP BLD CALC-SCNC: 10 MMOL/L (ref 5–15)
AST SERPL W P-5'-P-CCNC: 13 U/L (ref 15–37)
BILIRUB SERPL-MCNC: 0.5 MG/DL (ref 0.2–1)
BUN SERPL-MCNC: 25 MG/DL (ref 6–20)
BUN/CREAT SERPL: 26 (ref 12–20)
CALCIUM SERPL-MCNC: 8.5 MG/DL (ref 8.5–10.1)
CHLORIDE SERPL-SCNC: 115 MMOL/L (ref 97–108)
CO2 SERPL-SCNC: 24 MMOL/L (ref 21–32)
CREAT SERPL-MCNC: 0.95 MG/DL (ref 0.55–1.02)
ERYTHROCYTE [DISTWIDTH] IN BLOOD BY AUTOMATED COUNT: 15.7 % (ref 11.5–14.5)
GLOBULIN SER CALC-MCNC: 3.4 G/DL (ref 2–4)
GLUCOSE BLD STRIP.AUTO-MCNC: 122 MG/DL (ref 65–100)
GLUCOSE BLD STRIP.AUTO-MCNC: 136 MG/DL (ref 65–100)
GLUCOSE BLD STRIP.AUTO-MCNC: 145 MG/DL (ref 65–100)
GLUCOSE BLD STRIP.AUTO-MCNC: 151 MG/DL (ref 65–100)
GLUCOSE BLD STRIP.AUTO-MCNC: 162 MG/DL (ref 65–100)
GLUCOSE SERPL-MCNC: 150 MG/DL (ref 65–100)
HCT VFR BLD AUTO: 29.7 % (ref 35–47)
HGB BLD-MCNC: 9.2 G/DL (ref 11.5–16)
MCH RBC QN AUTO: 24.8 PG (ref 26–34)
MCHC RBC AUTO-ENTMCNC: 31 G/DL (ref 30–36.5)
MCV RBC AUTO: 80.1 FL (ref 80–99)
PLATELET # BLD AUTO: 279 K/UL (ref 150–400)
POTASSIUM SERPL-SCNC: 3.7 MMOL/L (ref 3.5–5.1)
PROT SERPL-MCNC: 6.2 G/DL (ref 6.4–8.2)
RBC # BLD AUTO: 3.71 M/UL (ref 3.8–5.2)
SERVICE CMNT-IMP: ABNORMAL
SODIUM SERPL-SCNC: 149 MMOL/L (ref 136–145)
WBC # BLD AUTO: 16.2 K/UL (ref 3.6–11)

## 2017-04-25 PROCEDURE — 74011250636 HC RX REV CODE- 250/636: Performed by: INTERNAL MEDICINE

## 2017-04-25 PROCEDURE — 74011636637 HC RX REV CODE- 636/637: Performed by: NURSE PRACTITIONER

## 2017-04-25 PROCEDURE — 74011000250 HC RX REV CODE- 250: Performed by: HOSPITALIST

## 2017-04-25 PROCEDURE — 74011000258 HC RX REV CODE- 258: Performed by: NURSE PRACTITIONER

## 2017-04-25 PROCEDURE — 80053 COMPREHEN METABOLIC PANEL: CPT | Performed by: NURSE PRACTITIONER

## 2017-04-25 PROCEDURE — 92610 EVALUATE SWALLOWING FUNCTION: CPT | Performed by: SPEECH-LANGUAGE PATHOLOGIST

## 2017-04-25 PROCEDURE — 65660000000 HC RM CCU STEPDOWN

## 2017-04-25 PROCEDURE — 85027 COMPLETE CBC AUTOMATED: CPT | Performed by: NURSE PRACTITIONER

## 2017-04-25 PROCEDURE — 74011250637 HC RX REV CODE- 250/637: Performed by: INTERNAL MEDICINE

## 2017-04-25 PROCEDURE — 82962 GLUCOSE BLOOD TEST: CPT

## 2017-04-25 PROCEDURE — 36415 COLL VENOUS BLD VENIPUNCTURE: CPT | Performed by: NURSE PRACTITIONER

## 2017-04-25 PROCEDURE — 94640 AIRWAY INHALATION TREATMENT: CPT

## 2017-04-25 PROCEDURE — 74011000250 HC RX REV CODE- 250: Performed by: INTERNAL MEDICINE

## 2017-04-25 PROCEDURE — 74011250636 HC RX REV CODE- 250/636: Performed by: STUDENT IN AN ORGANIZED HEALTH CARE EDUCATION/TRAINING PROGRAM

## 2017-04-25 RX ORDER — DEXTROSE MONOHYDRATE AND SODIUM CHLORIDE 5; .45 G/100ML; G/100ML
50 INJECTION, SOLUTION INTRAVENOUS CONTINUOUS
Status: DISCONTINUED | OUTPATIENT
Start: 2017-04-25 | End: 2017-04-26

## 2017-04-25 RX ORDER — DEXTROSE 50 % IN WATER (D50W) INTRAVENOUS SYRINGE
12.5-25 AS NEEDED
Status: DISCONTINUED | OUTPATIENT
Start: 2017-04-25 | End: 2017-04-25 | Stop reason: SDUPTHER

## 2017-04-25 RX ORDER — MAGNESIUM SULFATE 100 %
4 CRYSTALS MISCELLANEOUS AS NEEDED
Status: DISCONTINUED | OUTPATIENT
Start: 2017-04-25 | End: 2017-04-25 | Stop reason: SDUPTHER

## 2017-04-25 RX ORDER — HYDRALAZINE HYDROCHLORIDE 20 MG/ML
10 INJECTION INTRAMUSCULAR; INTRAVENOUS
Status: DISCONTINUED | OUTPATIENT
Start: 2017-04-25 | End: 2017-04-28 | Stop reason: HOSPADM

## 2017-04-25 RX ORDER — INSULIN LISPRO 100 [IU]/ML
INJECTION, SOLUTION INTRAVENOUS; SUBCUTANEOUS EVERY 6 HOURS
Status: DISCONTINUED | OUTPATIENT
Start: 2017-04-25 | End: 2017-04-28 | Stop reason: HOSPADM

## 2017-04-25 RX ADMIN — HEPARIN SODIUM 5000 UNITS: 5000 INJECTION, SOLUTION INTRAVENOUS; SUBCUTANEOUS at 23:02

## 2017-04-25 RX ADMIN — HYDROCODONE BITARTRATE AND ACETAMINOPHEN 1 TABLET: 5; 325 TABLET ORAL at 16:44

## 2017-04-25 RX ADMIN — HYDRALAZINE HYDROCHLORIDE 50 MG: 50 TABLET, FILM COATED ORAL at 16:44

## 2017-04-25 RX ADMIN — AMLODIPINE BESYLATE 2.5 MG: 5 TABLET ORAL at 22:57

## 2017-04-25 RX ADMIN — METRONIDAZOLE 500 MG: 500 INJECTION, SOLUTION INTRAVENOUS at 04:46

## 2017-04-25 RX ADMIN — LEVOFLOXACIN 250 MG: 5 INJECTION, SOLUTION INTRAVENOUS at 22:55

## 2017-04-25 RX ADMIN — IPRATROPIUM BROMIDE AND ALBUTEROL SULFATE 3 ML: .5; 3 SOLUTION RESPIRATORY (INHALATION) at 08:52

## 2017-04-25 RX ADMIN — RACEPINEPHRINE HYDROCHLORIDE 0.25 ML: 11.25 SOLUTION RESPIRATORY (INHALATION) at 09:53

## 2017-04-25 RX ADMIN — INSULIN LISPRO 3 UNITS: 100 INJECTION, SOLUTION INTRAVENOUS; SUBCUTANEOUS at 13:06

## 2017-04-25 RX ADMIN — METRONIDAZOLE 500 MG: 500 INJECTION, SOLUTION INTRAVENOUS at 13:07

## 2017-04-25 RX ADMIN — IPRATROPIUM BROMIDE AND ALBUTEROL SULFATE 3 ML: .5; 3 SOLUTION RESPIRATORY (INHALATION) at 00:07

## 2017-04-25 RX ADMIN — Medication 5 ML: at 11:28

## 2017-04-25 RX ADMIN — DEXTROSE MONOHYDRATE AND SODIUM CHLORIDE 50 ML/HR: 5; .45 INJECTION, SOLUTION INTRAVENOUS at 15:27

## 2017-04-25 RX ADMIN — METRONIDAZOLE 500 MG: 500 INJECTION, SOLUTION INTRAVENOUS at 23:02

## 2017-04-25 RX ADMIN — SODIUM CHLORIDE 75 ML/HR: 900 INJECTION, SOLUTION INTRAVENOUS at 08:43

## 2017-04-25 RX ADMIN — HYDRALAZINE HYDROCHLORIDE 10 MG: 20 INJECTION INTRAMUSCULAR; INTRAVENOUS at 07:35

## 2017-04-25 RX ADMIN — Medication 5 ML: at 22:55

## 2017-04-25 RX ADMIN — Medication 5 ML: at 13:06

## 2017-04-25 RX ADMIN — DONEPEZIL HYDROCHLORIDE 5 MG: 5 TABLET, FILM COATED ORAL at 22:57

## 2017-04-25 RX ADMIN — HYDRALAZINE HYDROCHLORIDE 10 MG: 20 INJECTION INTRAMUSCULAR; INTRAVENOUS at 19:09

## 2017-04-25 RX ADMIN — Medication 5 ML: at 06:15

## 2017-04-25 RX ADMIN — HYDROMORPHONE HYDROCHLORIDE 0.5 MG: 1 INJECTION, SOLUTION INTRAMUSCULAR; INTRAVENOUS; SUBCUTANEOUS at 11:28

## 2017-04-25 RX ADMIN — HYDRALAZINE HYDROCHLORIDE 50 MG: 50 TABLET, FILM COATED ORAL at 22:57

## 2017-04-25 RX ADMIN — HEPARIN SODIUM 5000 UNITS: 5000 INJECTION, SOLUTION INTRAVENOUS; SUBCUTANEOUS at 13:06

## 2017-04-25 NOTE — WOUND CARE
WOCN Note:   New consult placed by RN for skin/wound assessment; Chart reviewed prior to assessment;     Assessment:   Patient is alert, verbal, patient rated pain 0/10; Patient repositioned with max assist from left to supine on total care bed with heels floated; Pt tolerated assessment and procedure well. Able to palpate DP pulses bilaterally, feet warm, pink with good capillary refill;       Bilateral heel, buttocks, and sacral skin intact and without erythema. Skin Care/Prevention Recommendations:  1. Continue continence checks;    2. Reposition patient approximately every 2 hours. 3. Assess/protect skin in contact with medical devices. Keep skin moisturized. 4. Float Heels with pillow under calves. 5. Continue on Total care bed for pressure redistribution;  6.  Ensure that patient is repositioning in chair shifting weight approximately every 15 minutes and standing up every hour;       Discussed above assessment and recommendations with Nina (RN)     Sagar Dove RN, BSN, [facility]  Certified Wound, Ostomy, Continence Nurse  office [phone]  pager [facility]

## 2017-04-25 NOTE — PROGRESS NOTES
Problem: Patient Education: Go to Patient Education Activity  Goal: Patient/Family Education  Outcome: Progressing Towards Goal  Pt educated    Problem: Falls - Risk of  Goal: *Absence of falls  Outcome: Progressing Towards Goal  Bed alarms, bed lowest position    Problem: Delirium Treatment  Goal: *Cognitive status restored to baseline  Outcome: Progressing Towards Goal  Reality orrientation

## 2017-04-25 NOTE — PROGRESS NOTES
Problem: Dysphagia (Adult)  Goal: *Acute Goals and Plan of Care (Insert Text)  Speech Therapy Goals  Initiated 4/25/2017   1) Patient will participate in reevaluation of swallow within 7 days. SPEECH LANGUAGE PATHOLOGY BEDSIDE SWALLOW EVALUATION  Patient: Will Bender (28 y.o. female)  Date: 4/25/2017  Primary Diagnosis: Acute delirium  GALLSTONES  Procedure(s) (LRB):  CHOLECYSTECTOMY LAPAROSCOPIC POSSIBLE OPEN  (N/A)     Precautions: Aspiration         ASSESSMENT :  Based on the objective data described below, the patient presents with severe oral dysphagia and moderate-severe pharyngeal dysphagia. Oral dysphagia characterized by poor bolus acceptance and recognition, requiring max verbal cues to accept. No lip closure despite cues with bolus needing to be placed in oral cavity. Poor and uncoordinated bolus manipulation and delayed posterior propulsion with repeated tongue pumping. Bolus control worse with thin liquids, with suspected significant premature spillage and rapid posterior propulsion. Pharyngeal dysphagia characterized by suspected delayed initiation of swallow, and decreased and weak hyolaryngeal elevation/excursion via palpation. Multiple audible and uncoordinated swallows suggestive of pharyngeal residue and decreased pharyngeal coordination. Patient with increased respiratory rate and audible respiration with all consistencies concerning for increased aspiration risk. Mental status negatively impacts safe swallowing. Patient at risk of aspiration and not suitable for PO intake at this time. Patient will benefit from skilled intervention to address the above impairments.   Patients rehabilitation potential is considered to be Guarded  Factors which may influence rehabilitation potential include:   [ ]            None noted  [X]            Mental ability/status  [X]            Medical condition  [ ]            Home/family situation and support systems  [X]            Safety awareness  [ ]            Pain tolerance/management  [ ]            Other:        PLAN :  Recommendations and Planned Interventions:  -Recommend NPO. Meds via non-oral route if possible, if necessary may be crushed in single bite of purees. -SLP to follow-up for reassessment of swallow. Do not anticipate will see improvement unless mental status improves. Frequency/Duration: Patient will be followed by speech-language pathology 3 times a week to address goals. Discharge Recommendations: To Be Determined       SUBJECTIVE:   Patient stated I want coffee. Patient responses inconsistently appropriate throughout session.       OBJECTIVE:       Past Medical History:   Diagnosis Date    Asthma      [facility]      Heart failure ([facility]      Hypertension      Stroke [facility])       Past Surgical History:   Procedure Laterality Date    HX GYN         hysterectomy     Prior Level of Function/Home Situation:   Home Situation  Home Environment: [address] [place] [address] Name: Jered bartlett  # Steps to Enter: 0  One/Two Story Residence: One story  Living Alone: No  Support Systems: Child(jose alfredo)  Patient Expects to be Discharged to[de-identified] Skilled nursing facility  Current DME Used/Available at Home: Adaptive bathing aides, Adaptive dressing aides, Adaptive feeding aides  Diet prior to admission: Regular  Current Diet:  NPO   Cognitive and Communication Status:  Neurologic State: Alert, Confused  Orientation Level: Oriented to person, Disoriented to place, Disoriented to situation, Disoriented to time  Cognition: Impaired decision making, Decreased command following, Poor safety awareness     Perseveration: No perseveration noted  Safety/Judgement: Decreased awareness of environment, Decreased awareness of need for assistance, Decreased awareness of need for safety, Lack of insight into deficits  Oral Assessment:  Oral Assessment  Labial: No impairment  Dentition: Edentulous  Oral Hygiene: moist mucosa  Lingual: No impairment  Mandible: No impairment  P.O. Trials:  Patient Position: upright in bed  Vocal quality prior to P.O.: No impairment  Consistency Presented: Ice chips;Puree; Thin liquid  How Presented: SLP-fed/presented;Cup/sip;Spoon     Bolus Acceptance: Impaired (poor recognition of bolus, max cues to accept)  Bolus Formation/Control: Impaired  Type of Impairment: Premature spillage;Delayed;Poor;Mastication (poor bolus control, severely delayed manipulation)  Propulsion: Delayed (# of seconds); Tongue pumping;Discoordination  Oral Residue: None  Initiation of Swallow: Delayed (# of seconds) (suspected)  Laryngeal Elevation: Decreased;Weak  Aspiration Signs/Symptoms: Other (comment); Increase in RR (audible respiration)  Pharyngeal Phase Characteristics: Audible swallow;Multiple swallows; Suspected pharyngeal residue (decreased coordination)  Effective Modifications: Other (comment) (max verbal cues to increase acceptance)  Cues for Modifications: Maximal        Oral Phase Severity: Severe  Pharyngeal Phase Severity : Moderate-severe        G Codes: In compliance with CMSs Claims Based Outcome Reporting, the following G-code set was chosen for this patient based the use of the NOMS functional outcome to quantify this patient's level of swallowing impairment. Using the NOMS, the patient was determined to be at level 1 for swallow function which correlates with the CN= 100% level of severity. Based on the objective assessment provided within this note, the current, goal, and discharge g-codes are as follows:     Swallow  Swallowing:   Swallow Current Status CN= 100%   Swallow Goal Status CL= 60-79%         NOMS Swallowing Levels:  Level 1 (CN): NPO  Level 2 (CM): NPO but takes consistency in therapy  Level 3 (CL): Takes less than 50% of nutrition p.o. and continues with nonoral feedings; and/or safe with mod cues; and/or max diet restriction  Level 4 (CK):  Safe swallow but needs mod cues; and/or mod diet restriction; and/or still requires some nonoral feeding/supplements  Level 5 (CJ): Safe swallow with min diet restriction; and/or needs min cues  Level 6 (CI): Independent with p.o.; rare cues; usually self cues; may need to avoid some foods or needs extra time  Level 7 ([address] [place] [address]): Independent for all p.o.  GABY. (2003). National Outcomes Measurement System (NOMS): Adult Speech-Language Pathology User's Guide. Pain:  Pain Scale 1: Adult Nonverbal Pain Scale  Pain Intensity 1: 4     After treatment:   [ ]            Patient left in no apparent distress sitting up in chair  [X]            Patient left in no apparent distress in bed  [X]            Call bell left within reach  [X]            Nursing notified  [ ]            Caregiver present  [ ]            Bed alarm activated      COMMUNICATION/EDUCATION:   The patients plan of care including recommendations, planned interventions, and recommended diet changes were discussed with: Registered Nurse and Physician. [ ]            Patient/family have participated as able in goal setting and plan of care. [ ]            Patient/family agree to work toward stated goals and plan of care. [ ]            Patient understands intent and goals of therapy, but is neutral about his/her participation. [X]            Patient is unable to participate in goal setting and plan of care. Thank you for this referral.  ANGELA Espinal Student  Time Calculation: 13 mins        Regarding student involvement in patient care:  A student participated in this treatment session. Per CMS Medicare statements and APTA guidelines I certify that the following was true:  1. I was present and directly observed the entire session. 2. I made all skilled judgments and clinical decisions regarding care. 3. I am the practitioner responsible for assessment, treatment, and documentation.

## 2017-04-25 NOTE — PROGRESS NOTES
Progress Note    Patient: Gary Fowler MRN: [id number]  SSN: [id number]    YOB: 1933  Age: 80 y.o. Sex: female      Admit Date: 2017    Abdominal pain    Subjective:     No acute surgical issues. Pt more alert and conversant. Leukocystosis still persists. Objective:     Visit Vitals    /82    Pulse (!) 104    Temp 99.7 °F (37.6 °C)    Resp 21    Ht 5' 3\" (1.6 m)    Wt 215 lb 13.3 oz (97.9 kg)    SpO2 97%    Breastfeeding No    BMI 38.23 kg/m2       Temp (24hrs), Av.5 °F (37.5 °C), Min:98.8 °F (37.1 °C), Max:100.6 °F (38.1 °C)        Physical Exam:    Gen:  NAD  Pulm:  Unlabored  Abd:  S/mildly distended/mild TTP     Recent Results (from the past 24 hour(s))   GLUCOSE, POC    Collection Time: 17 12:34 AM   Result Value Ref Range    Glucose (POC) 122 (H) 65 - 100 mg/dL    Performed by Mario Hamm    CBC W/O DIFF    Collection Time: 17  2:55 AM   Result Value Ref Range    WBC 16.2 (H) 3.6 - 11.0 K/uL    RBC 3.71 (L) 3.80 - 5.20 M/uL    HGB 9.2 (L) 11.5 - 16.0 g/dL    HCT 29.7 (L) 35.0 - 47.0 %    MCV 80.1 80.0 - 99.0 FL    MCH 24.8 (L) 26.0 - 34.0 PG    MCHC 31.0 30.0 - 36.5 g/dL    RDW 15.7 (H) 11.5 - 14.5 %    PLATELET 211 674 - 576 K/uL   METABOLIC PANEL, COMPREHENSIVE    Collection Time: 17  2:55 AM   Result Value Ref Range    Sodium 149 (H) 136 - 145 mmol/L    Potassium 3.7 3.5 - 5.1 mmol/L    Chloride 115 (H) 97 - 108 mmol/L    CO2 24 21 - 32 mmol/L    Anion gap 10 5 - 15 mmol/L    Glucose 150 (H) 65 - 100 mg/dL    BUN 25 (H) 6 - 20 MG/DL    Creatinine 0.95 0.55 - 1.02 MG/DL    BUN/Creatinine ratio 26 (H) 12 - 20      GFR est AA >60 >60 ml/min/1.73m2    GFR est non-AA 56 (L) >60 ml/min/1.73m2    Calcium 8.5 8.5 - 10.1 MG/DL    Bilirubin, total 0.5 0.2 - 1.0 MG/DL    ALT (SGPT) 17 12 - 78 U/L    AST (SGOT) 13 (L) 15 - 37 U/L    Alk.  phosphatase 85 45 - 117 U/L    Protein, total 6.2 (L) 6.4 - 8.2 g/dL    Albumin 2.8 (L) 3.5 - 5.0 g/dL    Globulin 3.4 2.0 - 4.0 g/dL    A-G Ratio 0.8 (L) 1.1 - 2.2     GLUCOSE, POC    Collection Time: 04/25/17  6:13 AM   Result Value Ref Range    Glucose (POC) 136 (H) 65 - 100 mg/dL    Performed by Taj Gaines, POC    Collection Time: 04/25/17 11:33 AM   Result Value Ref Range    Glucose (POC) 151 (H) 65 - 100 mg/dL    Performed by Narciso Loza            Assessment:     Hospital Problems  Date Reviewed: 4/22/2017          Codes Class Noted POA    * (Principal)Acute delirium ICD-10-CM: R41.0  ICD-9-CM: 780.09  4/22/2017 Yes              Plan/Recommendations/Medical Decision Making:     - Abdominal pain:  Most likely related to cholecystitis. Pt was unable to under cholecystostomy tube placement yesterday. - Discussed with patient's daughter about lap cholecystectomy although this will be high risk.   Daughter said she will talk with family more about operation  - Advance diet as tolerated if cleared by Speech Pathology    Signed By: Jacob Sosa MD     April 25, 2017

## 2017-04-25 NOTE — PROGRESS NOTES
Hospitalist Progress Note  Анна Valerio NP  Office: [phone]  Cell: [phone]      Date of Service:  2017  NAME:  Rigoberto Givens  :  3/8/1933  MRN:  [id number]      Admission Summary:    80year old female with history of prior CVA with residual right-sided weakness, HTN, DM-2, dementia, chronic diastolic heart failure, hypothyroidism presented on 17 with altered mental status and poorly characterized right upper quadrant abdominal pain. She was noted to have findings concerning for acute cholecystitis on CT Abd/Pelvis. Interval history / Subjective:   Discussed plan with patient and daughter. Called General Surgery and they will be in to discuss next steps. Ms. Liban Michelle is having pain. Assessment & Plan:     1. Delirium, acute metabolic encephalopathy  -resolving   - suspect secondary to sepsis in setting of possible acute cholecystitis   - CT head  - Chronic microvascular ischemic change. No acute intracranial process. - MRI brain  - No evidence for acute infarction. Mild to moderate bilateral subcortical and periventricular increased T2/FLAIR signal abnormalities are nonspecific but may represent changes of chronic small  vessel ischemic disease.   - treating underlying conditions as below   - Family reports some improvement towards her baseline    2. Suspected sepsis secondary to acute cholecystitis   - with tachycardia, leukocytosis upon admission   - CT Abd/Pelvis  - Findings are suggestive of acute cholecystitis. - Abd U/S  - Distended gallbladder with gallstones; no biliary ductal dilatation. No right hydronephrosis.    - blood cultures  - gram positive cocci in clusters in 1/2 bottles drawn - coagulase negative Staphylococcus   - on empiric levofloxacin, Flagyl, added vancomycin due to GPCs in clusters as above, however, this is likely contaminant    - F/U final blood cultures   - repeat blood cultures with NGTD   - HIDA on 4/24 with cystic duct obstruction and possible acute cholecystitis   - Attempted to place cholecystostomy tube on 4/24 and unable to do so. Discussed with Katina Hallman NP, Dr. Jordan Cano will be in soon with next step options    3. Possible STACEY  -resolved   - unclear baseline Cr, possibly some underlying CKD, Cr elevated to 1.80 upon admission, suspect prerenal azotemia and sepsis contributing, seemingly improving   - IV fluid hydration    4. Prior CVA    - holding home Plavix in setting of possible need for intervention     5. Chronic diastolic heart failure   - does not currently appear in acute exacerbation of this diagnosis   - unclear baseline NYHA classification, unable to determine at this juncture given her mentation   - on hydralazine    6. Dementia   - continue home Aricept, Celexa    7. DM-2   - sliding scale insulin coverage increased to resistant   - continue home gabapentin    8. HTN   - resume home amlodipine, hydralazine    9. Hypothyroidism   - resume home levothyroxine    10. Wheezing   - predominantly upper airways, suspect some slight reactive airways   - prn DuoNebs    11. Morbid Obesity  - BMI 37.96    12 Hypernatremia  - change IVF to D51/2    Code status: DNR  DVT prophylaxis: heparin    Care Plan discussed with: Patient/Family and Nurse. Daughter updated at bedside 4-25  Disposition: TBD     Hospital Problems  Date Reviewed: 4/22/2017          Codes Class Noted POA    * (Principal)Acute delirium ICD-10-CM: R41.0  ICD-9-CM: 780.09  4/22/2017 Yes                Review of Systems:   Review of systems not obtained due to patient factors. Vital Signs:    Last 24hrs VS reviewed since prior progress note.  Most recent are:  Visit Vitals    /76    Pulse 90    Temp 99.1 °F (37.3 °C)    Resp 18    Ht 5' 3\" (1.6 m)    Wt 97.9 kg (215 lb 13.3 oz)    SpO2 98%    Breastfeeding No    BMI 38.23 kg/m2       No intake or output data in the 24 hours ending 04/25/17 1112     Physical Examination:             Constitutional:  No acute distress, cooperative, mumbling words   ENT:  Oral mucous slightly dry, oropharynx benign. Neck supple    Resp:  CTA, diminished bases blt   CV:  Regular rhythm, normal rate, no murmurs, gallops, rubs    GI:  Soft, non distended, no grimacing with deep palpation, normoactive bowel sounds, no hepatosplenomegaly     Musculoskeletal:  No edema, warm, 2+ pulses throughout    Neurologic:  Moves all extremities. AAOx1-2 (self only), otherwise, seemingly non-focal examination            Data Review:    Review and/or order of clinical lab test on 4/25      Labs:     Recent Labs      04/25/17 0255 04/24/17 0137   WBC  16.2*  14.5*   HGB  9.2*  9.2*   HCT  29.7*  29.5*   PLT  279  305     Recent Labs      04/25/17 0255 04/24/17 0137  04/23/17   0243   NA  149*  144  140   K  3.7  3.9  4.3   CL  115*  111*  107   CO2  24 25 28   BUN  25*  32*  37*   CREA  0.95  1.11*  1.49*   GLU  150*  149*  132*   CA  8.5  8.3*  8.6     Recent Labs      04/25/17 0255   SGOT  13*   ALT  17   AP  85   TBILI  0.5   TP  6.2*   ALB  2.8*   GLOB  3.4     Recent Labs      04/24/17 0137   INR  1.2*   PTP  11.9*   APTT  34.5*      No results for input(s): FE, TIBC, PSAT, FERR in the last 72 hours. No results found for: FOL, RBCF   No results for input(s): PH, PCO2, PO2 in the last 72 hours. No results for input(s): CPK, CKNDX, TROIQ in the last 72 hours.     No lab exists for component: CPKMB  Lab Results   Component Value Date/Time    Cholesterol, total 142 01/28/2015 03:59 AM    HDL Cholesterol 54 01/28/2015 03:59 AM    LDL, calculated 76.4 01/28/2015 03:59 AM    Triglyceride 58 01/28/2015 03:59 AM    CHOL/HDL Ratio 2.6 01/28/2015 03:59 AM     Lab Results   Component Value Date/Time    Glucose (POC) 136 04/25/2017 06:13 AM    Glucose (POC) 122 04/25/2017 12:34 AM    Glucose (POC) 139 04/24/2017 12:03 PM    Glucose (POC) 149 04/24/2017 07:28 AM    Glucose (POC) 129 04/23/2017 11:18 PM     Lab Results   Component Value Date/Time    Color DARK YELLOW 04/21/2017 03:57 PM    Appearance CLOUDY 04/21/2017 03:57 PM    Specific gravity 1.024 04/21/2017 03:57 PM    pH (UA) 5.0 04/21/2017 03:57 PM    Protein 300 04/21/2017 03:57 PM    Glucose NEGATIVE  04/21/2017 03:57 PM    Ketone TRACE 04/21/2017 03:57 PM    Bilirubin NEGATIVE  01/27/2015 12:20 PM    Urobilinogen 0.2 04/21/2017 03:57 PM    Nitrites NEGATIVE  04/21/2017 03:57 PM    Leukocyte Esterase NEGATIVE  04/21/2017 03:57 PM    Epithelial cells FEW 04/21/2017 03:57 PM    Bacteria NEGATIVE  04/21/2017 03:57 PM    WBC 0-4 04/21/2017 03:57 PM    RBC 5-10 04/21/2017 03:57 PM         Medications Reviewed:     Current Facility-Administered Medications   Medication Dose Route Frequency    hydrALAZINE (APRESOLINE) 20 mg/mL injection 10 mg  10 mg IntraVENous Q6H PRN    sodium chloride (NS) flush 5 mL  5 mL InterCATHeter PRN    sodium chloride (NS) flush 5 mL  5 mL InterCATHeter Q8H    acetaminophen (TYLENOL) tablet 500 mg  500 mg Oral Q8H PRN    amLODIPine (NORVASC) tablet 2.5 mg  2.5 mg Oral QHS    citalopram (CELEXA) tablet 20 mg  20 mg Oral DAILY    donepezil (ARICEPT) tablet 5 mg  5 mg Oral QHS    ergocalciferol (ERGOCALCIFEROL) capsule 100,000 Units  100,000 Units Oral EVERY MONTH    gabapentin (NEURONTIN) capsule 300 mg  300 mg Oral QHS    guaiFENesin (ROBITUSSIN) 100 mg/5 mL oral liquid 200 mg  200 mg Oral Q4H PRN    hydrALAZINE (APRESOLINE) tablet 50 mg  50 mg Oral TID    HYDROcodone-acetaminophen (NORCO) 5-325 mg per tablet 1 Tab  1 Tab Oral Q6H PRN    levothyroxine (SYNTHROID) tablet 100 mcg  100 mcg Oral ACB    magnesium oxide (MAG-OX) tablet 400 mg  400 mg Oral DAILY    polyethylene glycol (MIRALAX) packet 17 g  17 g Oral DAILY    simethicone (MYLICON) tablet 158 mg  160 mg Oral Q8H PRN    0.9% sodium chloride infusion  75 mL/hr IntraVENous CONTINUOUS    HYDROmorphone (PF) (DILAUDID) injection 0.5 mg  0.5 mg IntraVENous Q4H PRN    ondansetron (ZOFRAN) injection 4 mg  4 mg IntraVENous Q4H PRN    heparin (porcine) injection 5,000 Units  5,000 Units SubCUTAneous Q8H    albuterol-ipratropium (DUO-NEB) 2.5 MG-0.5 MG/3 ML  3 mL Nebulization Q4H PRN    metroNIDAZOLE (FLAGYL) IVPB premix 500 mg  500 mg IntraVENous Q8H    lactobac ac& pc-s.therm-b.anim (ARACELIS Q/RISAQUAD)  1 Cap Oral DAILY    glucose chewable tablet 16 g  4 Tab Oral PRN    dextrose (D50W) injection syrg 12.5-25 g  12.5-25 g IntraVENous PRN    glucagon (GLUCAGEN) injection 1 mg  1 mg IntraMUSCular PRN    insulin lispro (HUMALOG) injection   SubCUTAneous Q6H    levoFLOXacin (LEVAQUIN) 250 mg in D5W IVPB  250 mg IntraVENous Q24H    acetaminophen (TYLENOL) suppository 650 mg  650 mg Rectal Q6H PRN     ______________________________________________________________________  EXPECTED LENGTH OF STAY: 4d 21h  ACTUAL LENGTH OF STAY:          Cheng Sutton NP

## 2017-04-25 NOTE — INTERDISCIPLINARY ROUNDS
IDR/SLIDR Summary        Patient: Katarzyna Grossman MRN: [id number] Age: 80 y.o. YOB: 1933 Room/Bed: [facility]   Admit Diagnosis: Acute delirium  Principal Diagnosis: Acute delirium   Goals: Safety, HIDA scan  Readmission: NO  Quality Measure: SEPSIS  VTE Prophylaxis: Chemical  Influenza Vaccine screening completed? YES  Pneumococcal Vaccine screening completed? YES  Mobility needs: Yes   Nutrition plan:Yes  Consults:P.T, O.T., Speech, Respiratory and Case Management    Financial concerns:No  Escalated to CM? YES  RRAT Score: 12   Interventions:  Testing due for pt today?  YES  LOS: 2 days Expected length of stay >2 days  Discharge plan: TBD   PCP: PROVIDER UNKNOWN  Transportation needs: Yes    Days before discharge:two or more days before discharge   Discharge disposition: Nursing Home    Signed:     Minh Fritz  4/25/2017  5:22 AM    Gabriele DUQUE

## 2017-04-25 NOTE — PROGRESS NOTES
Reviewed chart and note patient remains NPO after failed cholecystostomy tube with surgery recommendations pending. Will need clearance from surgery prior to performing swallowing evaluation. MD, please advise. Thank you. Shelby Duverney, M.CD.  CCC-SLP

## 2017-04-25 NOTE — PROGRESS NOTES
04/25/17 1100   Vitals   Temp 99.7 °F (37.6 °C)   Temp Source Axillary   Pulse (Heart Rate) (!) 104   Heart Rate Source Monitor   Resp Rate 21   O2 Sat (%) 97 %   Level of Consciousness Responds to Voice   /82   MAP (Calculated) 108   MEWS Score 4   Pain 1   Pain Scale 1 Adult Nonverbal Pain Scale   Pain Intensity 1 4   Patient Stated Pain Goal Unable to verbalize/indicatate pain   Pain Intervention(s) 1 Medication (see MAR);MD notified (comment)   Adult Nonverbal Pain Scale   Face 1   Activity (Movement) 1   Guarding 1   Physiology (Vital Signs) 1   Respiratory 0   Total Score 4     NP Cathleen notified. NP to bedside.

## 2017-04-26 LAB
ALBUMIN SERPL BCP-MCNC: 2.8 G/DL (ref 3.5–5)
ALBUMIN/GLOB SERPL: 0.9 {RATIO} (ref 1.1–2.2)
ALP SERPL-CCNC: 92 U/L (ref 45–117)
ALT SERPL-CCNC: 15 U/L (ref 12–78)
ANION GAP BLD CALC-SCNC: 10 MMOL/L (ref 5–15)
AST SERPL W P-5'-P-CCNC: 13 U/L (ref 15–37)
BASOPHILS # BLD AUTO: 0 K/UL (ref 0–0.1)
BASOPHILS # BLD: 0 % (ref 0–1)
BILIRUB SERPL-MCNC: 0.4 MG/DL (ref 0.2–1)
BUN SERPL-MCNC: 21 MG/DL (ref 6–20)
BUN/CREAT SERPL: 23 (ref 12–20)
CALCIUM SERPL-MCNC: 8.6 MG/DL (ref 8.5–10.1)
CHLORIDE SERPL-SCNC: 117 MMOL/L (ref 97–108)
CO2 SERPL-SCNC: 24 MMOL/L (ref 21–32)
CREAT SERPL-MCNC: 0.92 MG/DL (ref 0.55–1.02)
DIFFERENTIAL METHOD BLD: ABNORMAL
EOSINOPHIL # BLD: 0 K/UL (ref 0–0.4)
EOSINOPHIL NFR BLD: 0 % (ref 0–7)
ERYTHROCYTE [DISTWIDTH] IN BLOOD BY AUTOMATED COUNT: 15.9 % (ref 11.5–14.5)
GLOBULIN SER CALC-MCNC: 3.1 G/DL (ref 2–4)
GLUCOSE BLD STRIP.AUTO-MCNC: 130 MG/DL (ref 65–100)
GLUCOSE BLD STRIP.AUTO-MCNC: 171 MG/DL (ref 65–100)
GLUCOSE BLD STRIP.AUTO-MCNC: 198 MG/DL (ref 65–100)
GLUCOSE SERPL-MCNC: 193 MG/DL (ref 65–100)
HCT VFR BLD AUTO: 30.4 % (ref 35–47)
HGB BLD-MCNC: 9.9 G/DL (ref 11.5–16)
LYMPHOCYTES # BLD AUTO: 3 % (ref 12–49)
LYMPHOCYTES # BLD: 0.5 K/UL (ref 0.8–3.5)
MCH RBC QN AUTO: 25.6 PG (ref 26–34)
MCHC RBC AUTO-ENTMCNC: 32.6 G/DL (ref 30–36.5)
MCV RBC AUTO: 78.6 FL (ref 80–99)
METAMYELOCYTES NFR BLD MANUAL: 3 %
MONOCYTES # BLD: 0.8 K/UL (ref 0–1)
MONOCYTES NFR BLD AUTO: 5 % (ref 5–13)
NEUTS BAND NFR BLD MANUAL: 7 % (ref 0–6)
NEUTS SEG # BLD: 14.5 K/UL (ref 1.8–8)
NEUTS SEG NFR BLD AUTO: 82 % (ref 32–75)
NRBC # BLD: 0.05 K/UL (ref 0–0.01)
NRBC BLD-RTO: 0.3 PER 100 WBC
PLATELET # BLD AUTO: 283 K/UL (ref 150–400)
PLATELET COMMENTS,PCOM: ABNORMAL
POTASSIUM SERPL-SCNC: 3.6 MMOL/L (ref 3.5–5.1)
PROT SERPL-MCNC: 5.9 G/DL (ref 6.4–8.2)
RBC # BLD AUTO: 3.87 M/UL (ref 3.8–5.2)
RBC MORPH BLD: ABNORMAL
SERVICE CMNT-IMP: ABNORMAL
SODIUM SERPL-SCNC: 151 MMOL/L (ref 136–145)
WBC # BLD AUTO: 16.3 K/UL (ref 3.6–11)
WBC MORPH BLD: ABNORMAL
WBC NRBC COR # BLD: ABNORMAL 10*3/UL

## 2017-04-26 PROCEDURE — 74011250637 HC RX REV CODE- 250/637: Performed by: NURSE PRACTITIONER

## 2017-04-26 PROCEDURE — 82962 GLUCOSE BLOOD TEST: CPT

## 2017-04-26 PROCEDURE — 92526 ORAL FUNCTION THERAPY: CPT | Performed by: PHYSICAL MEDICINE & REHABILITATION

## 2017-04-26 PROCEDURE — 80053 COMPREHEN METABOLIC PANEL: CPT | Performed by: NURSE PRACTITIONER

## 2017-04-26 PROCEDURE — 74011636637 HC RX REV CODE- 636/637: Performed by: NURSE PRACTITIONER

## 2017-04-26 PROCEDURE — 65660000000 HC RM CCU STEPDOWN

## 2017-04-26 PROCEDURE — 74011250637 HC RX REV CODE- 250/637: Performed by: INTERNAL MEDICINE

## 2017-04-26 PROCEDURE — 74011000258 HC RX REV CODE- 258: Performed by: SURGERY

## 2017-04-26 PROCEDURE — 85025 COMPLETE CBC W/AUTO DIFF WBC: CPT | Performed by: NURSE PRACTITIONER

## 2017-04-26 PROCEDURE — 74011250636 HC RX REV CODE- 250/636: Performed by: INTERNAL MEDICINE

## 2017-04-26 PROCEDURE — 74011000250 HC RX REV CODE- 250: Performed by: INTERNAL MEDICINE

## 2017-04-26 PROCEDURE — 36415 COLL VENOUS BLD VENIPUNCTURE: CPT | Performed by: NURSE PRACTITIONER

## 2017-04-26 PROCEDURE — 74011250636 HC RX REV CODE- 250/636: Performed by: SURGERY

## 2017-04-26 PROCEDURE — 3E0436Z INTRODUCTION OF NUTRITIONAL SUBSTANCE INTO CENTRAL VEIN, PERCUTANEOUS APPROACH: ICD-10-PCS | Performed by: SURGERY

## 2017-04-26 PROCEDURE — 74011000250 HC RX REV CODE- 250: Performed by: SURGERY

## 2017-04-26 RX ORDER — LEVOFLOXACIN 5 MG/ML
500 INJECTION, SOLUTION INTRAVENOUS EVERY 24 HOURS
Status: DISCONTINUED | OUTPATIENT
Start: 2017-04-26 | End: 2017-04-28

## 2017-04-26 RX ORDER — AMLODIPINE BESYLATE 5 MG/1
5 TABLET ORAL
Status: DISCONTINUED | OUTPATIENT
Start: 2017-04-26 | End: 2017-04-28 | Stop reason: HOSPADM

## 2017-04-26 RX ADMIN — HEPARIN SODIUM 5000 UNITS: 5000 INJECTION, SOLUTION INTRAVENOUS; SUBCUTANEOUS at 20:55

## 2017-04-26 RX ADMIN — LEVOTHYROXINE SODIUM 100 MCG: 100 TABLET ORAL at 07:08

## 2017-04-26 RX ADMIN — ASCORBIC ACID, VITAMIN A PALMITATE, CHOLECALCIFEROL, THIAMINE HYDROCHLORIDE, RIBOFLAVIN-5 PHOSPHATE SODIUM, PYRIDOXINE HYDROCHLORIDE, NIACINAMIDE, DEXPANTHENOL, ALPHA-TOCOPHEROL ACETATE, VITAMIN K1, FOLIC ACID, BIOTIN, CYANOCOBALAMIN: 200; 3300; 200; 6; 3.6; 6; 40; 15; 10; 150; 600; 60; 5 INJECTION, SOLUTION INTRAVENOUS at 18:47

## 2017-04-26 RX ADMIN — Medication 1 CAPSULE: at 08:39

## 2017-04-26 RX ADMIN — METRONIDAZOLE 500 MG: 500 INJECTION, SOLUTION INTRAVENOUS at 04:31

## 2017-04-26 RX ADMIN — HYDRALAZINE HYDROCHLORIDE 50 MG: 50 TABLET, FILM COATED ORAL at 23:08

## 2017-04-26 RX ADMIN — HYDRALAZINE HYDROCHLORIDE 50 MG: 50 TABLET, FILM COATED ORAL at 17:25

## 2017-04-26 RX ADMIN — HYDROMORPHONE HYDROCHLORIDE 0.5 MG: 1 INJECTION, SOLUTION INTRAMUSCULAR; INTRAVENOUS; SUBCUTANEOUS at 00:03

## 2017-04-26 RX ADMIN — Medication 5 ML: at 08:39

## 2017-04-26 RX ADMIN — HEPARIN SODIUM 5000 UNITS: 5000 INJECTION, SOLUTION INTRAVENOUS; SUBCUTANEOUS at 12:59

## 2017-04-26 RX ADMIN — HYDROMORPHONE HYDROCHLORIDE 0.5 MG: 1 INJECTION, SOLUTION INTRAMUSCULAR; INTRAVENOUS; SUBCUTANEOUS at 18:44

## 2017-04-26 RX ADMIN — Medication 5 ML: at 08:40

## 2017-04-26 RX ADMIN — Medication 5 ML: at 13:14

## 2017-04-26 RX ADMIN — HEPARIN SODIUM 5000 UNITS: 5000 INJECTION, SOLUTION INTRAVENOUS; SUBCUTANEOUS at 04:00

## 2017-04-26 RX ADMIN — LEVOFLOXACIN 500 MG: 5 INJECTION, SOLUTION INTRAVENOUS at 22:19

## 2017-04-26 RX ADMIN — DONEPEZIL HYDROCHLORIDE 5 MG: 5 TABLET, FILM COATED ORAL at 23:09

## 2017-04-26 RX ADMIN — GABAPENTIN 300 MG: 300 CAPSULE ORAL at 23:10

## 2017-04-26 RX ADMIN — AMLODIPINE BESYLATE 5 MG: 5 TABLET ORAL at 23:08

## 2017-04-26 RX ADMIN — METRONIDAZOLE 500 MG: 500 INJECTION, SOLUTION INTRAVENOUS at 12:59

## 2017-04-26 RX ADMIN — HYDRALAZINE HYDROCHLORIDE 50 MG: 50 TABLET, FILM COATED ORAL at 08:39

## 2017-04-26 RX ADMIN — CITALOPRAM HYDROBROMIDE 20 MG: 20 TABLET ORAL at 08:39

## 2017-04-26 RX ADMIN — Medication 5 ML: at 23:13

## 2017-04-26 RX ADMIN — INSULIN LISPRO 3 UNITS: 100 INJECTION, SOLUTION INTRAVENOUS; SUBCUTANEOUS at 13:13

## 2017-04-26 RX ADMIN — Medication 5 ML: at 13:00

## 2017-04-26 RX ADMIN — HYDROMORPHONE HYDROCHLORIDE 0.5 MG: 1 INJECTION, SOLUTION INTRAMUSCULAR; INTRAVENOUS; SUBCUTANEOUS at 13:13

## 2017-04-26 RX ADMIN — METRONIDAZOLE 500 MG: 500 INJECTION, SOLUTION INTRAVENOUS at 20:54

## 2017-04-26 RX ADMIN — HYDRALAZINE HYDROCHLORIDE 10 MG: 20 INJECTION INTRAMUSCULAR; INTRAVENOUS at 12:59

## 2017-04-26 RX ADMIN — Medication 5 ML: at 05:31

## 2017-04-26 RX ADMIN — HYDRALAZINE HYDROCHLORIDE 10 MG: 20 INJECTION INTRAMUSCULAR; INTRAVENOUS at 04:31

## 2017-04-26 NOTE — PROGRESS NOTES
Problem: Delirium Treatment  Goal: *Absence of falls  Outcome: Progressing Towards Goal  No falls, bed alarm on

## 2017-04-26 NOTE — INTERDISCIPLINARY ROUNDS
IDR/SLIDR Summary        Patient: Lady Arana MRN: [id number] Age: 80 y.o. YOB: 1933 Room/Bed: [facility]   Admit Diagnosis: Acute delirium Principal Diagnosis: Acute delirium   Goals: Safety, HIDA scan  Readmission: NO Quality Measure: SEPSIS  VTE Prophylaxis: Chemical  Influenza Vaccine screening completed? YES  Pneumococcal Vaccine screening completed? YES  Mobility needs: Yes Nutrition plan:Yes  Consults:P.T, O.T., Speech, Respiratory and Case Management   Financial concerns:No Escalated to CM? YES  RRAT Score: 12 Interventions:  Testing due for pt today?  YES  LOS: 2 days Expected length of stay >2 days  Discharge plan: TBD PCP: PROVIDER UNKNOWN  Transportation needs: Yes   Days before discharge:two or more days before discharge   Discharge disposition: Nursing Home    Signed:     Chapincito Wilson  4/26/2017  7:26 AM

## 2017-04-26 NOTE — PROGRESS NOTES
NUTRITION COMPLETE ASSESSMENT    RECOMMENDATIONS:   1. Placement of NGT until swallow improves instead of TPN since pt with functional GI tract:    - Initiate with Glucerna 1.2 @24ml/hr advanced 15ml q8hr to goal rate of 54ml/hr + 1 pkt prosource liquid + 85ml flush q4hr    2. If to continue with TPN:    - Continue current order x 24 hrs   - Increase to goal of 5%AA, D15 @75ml/hr + 500ml, 20% lipids 2x/week    3. Check baseline TG, LFT's and recheck Mg, Phos, K+ for 1st three days after starting TPN    4. Daily weights     Interventions/Plan:   Food/Nutrient Delivery:          Initiate enteral nutrition, Modify rate, concentration, composition, and schedule    Assessment:   Reason for Assessment:   [x]NPO/Clear Liquid   [x]Other: new TPN    Diet: Clear liquids  TPN: 5%AA, D20 @ 42ml/hr (to start tonight)  Nutritionally Significant Medications: [x] Reviewed & Includes: ergocalciferol, SSI, Anne Marie-Q, levaquin, synthroid, mag-ox, flagyl, miralax, vanco    Subjective:  Pt sleeping at time of visit, no family at bedside. Objective:  Pt admitted for acute delirium. PMHx: CHF, HTN, stroke, DM, asthma, dementia. Non-ambulatory and lives at nursing home. Unable to place cholecystostomy tube and planned lap hasmukh canceled to due pt's high risk for surgery. TPN started by surgery since unable to advance diet today. Seen by SLP but not ready for diet advancement, ok for ice chips and small bites of puree when alert. Negative Malnutrition Screen on admit and no change in PO noted per chart review. Wt Readings from Last 10 Encounters:   04/26/17 101.8 kg (224 lb 6.4 oz)   04/22/17 99 kg (218 lb 4.8 oz)   01/28/15 88.6 kg (195 lb 5.2 oz)   12/23/12 106.6 kg (235 lb)   08/30/11 108.9 kg (240 lb)     Pt has now been NPO x 4 days since admit. Agree with nutrition support but recommend placement of NGT until swallow improves since pt with functional GI tract.     - Tube feeds: Glucerna 1.2 @54ml/hr + 1 pkt prosource liquid + 85ml flush q4hr. Provides: 1296ml, 1615kcal, 93g protein, 1043ml free fluid + 510ml flush = 1553ml fluid. - TPN: Continue current order x 24 hrs. Increase to goal of 5%AA, D15 @75ml/hr + 500ml, 20% lipids 2x/week. Provides: 1563kcal, 90g protein, 1800ml fluid. Will continue to follow for electrolytes, wt trends. Estimated Nutrition Needs:   Kcals/day: 1540 Kcals/day (1540-1682kcal)  Protein: 80 g (80-99g (0.8-1g/kg))  Fluid: 1540 ml (1ml/kcal)  Based On: [place] (x 1.1-1.2)  Weight Used: Other (comment) (admit wt 99kg)    Pt expected to meet estimated nutrient needs:  []   Yes     [x]  No (without nutrition support) [] Unable to predict at this time  Nutrition Diagnosis:   1. Inadequate protein-energy intake related to swallowing difficulty 2/2 AMS as evidenced by sips of clear and bites of puree per SLP; NPO/clears x 4 days; TPN to start    Goals:     Nutrition support to meet at least 90% needs until oral intake can meet 60% needs     Monitoring & Evaluation:    - Enteral/parenteral nutrition intake   - Weight/weight change, Electrolyte and renal profile (swallow)   -      Previous Nutrition Goals Met:   N/A  Previous Recommendations:    N/A    Education & Discharge Needs:   [x] None Identified   [] Identified and addressed    [] Participated in care plan, discharge planning, and/or interdisciplinary rounds        Cultural, Holiness and ethnic food preferences identified: None    Skin Integrity: [x]Intact  []Other  Edema: []None [x]Other: 1+ BLLE, 1+ RUE  Last BM: PTA  Food Allergies: [x]None []Other  Diet Restrictions: Cultural/Taoism Preference(s): None     Anthropometrics:    Weight Loss Metrics 4/26/2017 1/28/2015 12/23/2012 8/30/2011   Today's Wt 224 lb 6.4 oz 195 lb 5.2 oz 235 lb 240 lb   BMI 39.75 kg/m2 34.61 kg/m2 41.64 kg/m2 42.52 kg/m2      Weight Source: Bed  Height: 5' 3\" (160 cm),    Body mass index is 39.75 kg/(m^2).   IBW : 52.2 kg (115 lb), % IBW (Calculated): 195.13 %   , Labs:    Lab Results   Component Value Date/Time    Sodium 151 04/26/2017 03:58 AM    Potassium 3.6 04/26/2017 03:58 AM    Chloride 117 04/26/2017 03:58 AM    CO2 24 04/26/2017 03:58 AM    Glucose 193 04/26/2017 03:58 AM    BUN 21 04/26/2017 03:58 AM    Creatinine 0.92 04/26/2017 03:58 AM    Calcium 8.6 04/26/2017 03:58 AM    Magnesium 1.9 04/22/2017 03:36 AM    Phosphorus 3.0 04/22/2017 03:36 AM    Albumin 2.8 04/26/2017 03:58 AM     Lab Results   Component Value Date/Time    Hemoglobin A1c 5.7 04/22/2017 03:36 AM     Isaias Stapleton RD

## 2017-04-26 NOTE — PROGRESS NOTES
Hospitalist Progress Note  Andreas Acuna NP  Office: [phone]  Cell: [phone]      Date of Service:  2017  NAME:  Humberto Epley  :  3/8/1933  MRN:  [id number]      Admission Summary:    80year old female with history of prior CVA with residual right-sided weakness, HTN, DM-2, dementia, chronic diastolic heart failure, hypothyroidism presented on 17 with altered mental status and poorly characterized right upper quadrant abdominal pain. She was noted to have findings concerning for acute cholecystitis on CT Abd/Pelvis. Interval history / Subjective:      Family has declined surgery  Patient failed swallow test due to cognition    Assessment & Plan:     1. Delirium, acute metabolic encephalopathy  -resolving   - suspect secondary to sepsis in setting of possible acute cholecystitis   - CT head  - Chronic microvascular ischemic change. No acute intracranial process. - MRI brain  - No evidence for acute infarction. Mild to moderate bilateral subcortical and periventricular increased T2/FLAIR signal abnormalities are nonspecific but may represent changes of chronic small  vessel ischemic disease.   - treating underlying conditions as below   - Family reports some improvement towards her baseline    2. Suspected sepsis secondary to acute cholecystitis   - with tachycardia, leukocytosis upon admission   - CT Abd/Pelvis  - Findings are suggestive of acute cholecystitis. - Abd U/S  - Distended gallbladder with gallstones; no biliary ductal dilatation. No right hydronephrosis.    - blood cultures  - gram positive cocci in clusters in 1/2 bottles drawn - coagulase negative Staphylococcus   - on empiric levofloxacin, Flagyl, added vancomycin due to GPCs in clusters as above, however, this is likely contaminant , Vanc dc'd   - NGTD on repeat cultures    - HIDA on  with cystic duct obstruction and possible acute cholecystitis   - Attempted to place cholecystostomy tube on 4/24 and unable to do so. Family declines surgery as patient is getting better with ABX and support    3. Possible STACEY  -resolved   - unclear baseline Cr, possibly some underlying CKD, Cr elevated to 1.80 upon admission, suspect prerenal azotemia and sepsis contributing, seemingly improving   - IV fluid hydration    4. Prior CVA    - holding home Plavix in setting of possible need for intervention     5. Chronic diastolic heart failure   - does not currently appear in acute exacerbation of this diagnosis   - unclear baseline NYHA classification, unable to determine at this juncture given her mentation   - on hydralazine    6. Dementia   - continue home Aricept, Celexa    7. DM-2   - sliding scale insulin coverage increased to resistant, stable   - continue home gabapentin    8. HTN   - home amlodipine, hydralazine  -increased Amlodipine dose on 4/26    9. Hypothyroidism   - resume home levothyroxine    10. Wheezing   - predominantly upper airways, suspect some slight reactive airways   - prn DuoNebs    11. Morbid Obesity  - BMI 37.96    12 Hypernatremia  - TPN started  -monitor      13. Malnutrition in the setting of dysphagia  -TPN to start tonight  -??? Dietary put recs in for feeds, but no conversation that I know of with family? ?    Code status: DNR  DVT prophylaxis: heparin    Care Plan discussed with: Patient/Family and Nurse. Disposition: TBD     Hospital Problems  Date Reviewed: 4/22/2017          Codes Class Noted POA    * (Principal)Acute delirium ICD-10-CM: R41.0  ICD-9-CM: 780.09  4/22/2017 Yes                Review of Systems:   Review of systems not obtained due to patient factors. Vital Signs:    Last 24hrs VS reviewed since prior progress note.  Most recent are:  Visit Vitals    /67    Pulse 85    Temp 98 °F (36.7 °C)    Resp 14    Ht 5' 3\" (1.6 m)    Wt 101.8 kg (224 lb 6.4 oz)    SpO2 97%    Breastfeeding No    BMI 39.75 kg/m2         Intake/Output Summary (Last 24 hours) at 04/26/17 1723  Last data filed at 04/26/17 0800   Gross per 24 hour   Intake               30 ml   Output                0 ml   Net               30 ml        Physical Examination:             Constitutional:  No acute distress, cooperative, mumbling words   ENT:  Oral mucous slightly dry, oropharynx benign. Neck supple    Resp:  CTA, diminished bases blt   CV:  Regular rhythm, normal rate, no murmurs, gallops, rubs    GI:  Soft, non distended, no grimacing with deep palpation, normoactive bowel sounds, no hepatosplenomegaly     Musculoskeletal:  No edema, warm, 2+ pulses throughout    Neurologic:  Moves all extremities. AAOx1-2 (self only), otherwise, seemingly non-focal examination            Data Review:    Review and/or order of clinical lab test on 4/25      Labs:     Recent Labs      04/26/17 0358 04/25/17 0255   WBC  16.3*  16.2*   HGB  9.9*  9.2*   HCT  30.4*  29.7*   PLT  283  279     Recent Labs      04/26/17 0358 04/25/17 0255  04/24/17 0137   NA  151*  149*  144   K  3.6  3.7  3.9   CL  117*  115*  111*   CO2  24 24 25   BUN  21*  25*  32*   CREA  0.92  0.95  1.11*   GLU  193*  150*  149*   CA  8.6  8.5  8.3*     Recent Labs      04/26/17 0358 04/25/17 0255   SGOT  13*  13*   ALT  15  17   AP  92  85   TBILI  0.4  0.5   TP  5.9*  6.2*   ALB  2.8*  2.8*   GLOB  3.1  3.4     Recent Labs      04/24/17 0137   INR  1.2*   PTP  11.9*   APTT  34.5*      No results for input(s): FE, TIBC, PSAT, FERR in the last 72 hours. No results found for: FOL, RBCF   No results for input(s): PH, PCO2, PO2 in the last 72 hours. No results for input(s): CPK, CKNDX, TROIQ in the last 72 hours.     No lab exists for component: CPKMB  Lab Results   Component Value Date/Time    Cholesterol, total 142 01/28/2015 03:59 AM    HDL Cholesterol 54 01/28/2015 03:59 AM    LDL, calculated 76.4 01/28/2015 03:59 AM    Triglyceride 58 01/28/2015 03:59 AM    CHOL/HDL Ratio 2.6 01/28/2015 03:59 AM     Lab Results   Component Value Date/Time    Glucose (POC) 130 04/26/2017 05:16 PM    Glucose (POC) 198 04/26/2017 11:51 AM    Glucose (POC) 171 04/26/2017 05:30 AM    Glucose (POC) 162 04/25/2017 11:20 PM    Glucose (POC) 145 04/25/2017 04:40 PM     Lab Results   Component Value Date/Time    Color DARK YELLOW 04/21/2017 03:57 PM    Appearance CLOUDY 04/21/2017 03:57 PM    Specific gravity 1.024 04/21/2017 03:57 PM    pH (UA) 5.0 04/21/2017 03:57 PM    Protein 300 04/21/2017 03:57 PM    Glucose NEGATIVE  04/21/2017 03:57 PM    Ketone TRACE 04/21/2017 03:57 PM    Bilirubin NEGATIVE  01/27/2015 12:20 PM    Urobilinogen 0.2 04/21/2017 03:57 PM    Nitrites NEGATIVE  04/21/2017 03:57 PM    Leukocyte Esterase NEGATIVE  04/21/2017 03:57 PM    Epithelial cells FEW 04/21/2017 03:57 PM    Bacteria NEGATIVE  04/21/2017 03:57 PM    WBC 0-4 04/21/2017 03:57 PM    RBC 5-10 04/21/2017 03:57 PM         Medications Reviewed:     Current Facility-Administered Medications   Medication Dose Route Frequency    TPN ADULT - CENTRAL AA 5% D20% W/ ELECTROLYTES AND CA   IntraVENous CONTINUOUS    levoFLOXacin (LEVAQUIN) 500 mg in D5W IVPB  500 mg IntraVENous Q24H    hydrALAZINE (APRESOLINE) 20 mg/mL injection 10 mg  10 mg IntraVENous Q6H PRN    insulin lispro (HUMALOG) injection   SubCUTAneous Q6H    sodium chloride (NS) flush 5 mL  5 mL InterCATHeter PRN    sodium chloride (NS) flush 5 mL  5 mL InterCATHeter Q8H    acetaminophen (TYLENOL) tablet 500 mg  500 mg Oral Q8H PRN    amLODIPine (NORVASC) tablet 2.5 mg  2.5 mg Oral QHS    citalopram (CELEXA) tablet 20 mg  20 mg Oral DAILY    donepezil (ARICEPT) tablet 5 mg  5 mg Oral QHS    ergocalciferol (ERGOCALCIFEROL) capsule 100,000 Units  100,000 Units Oral EVERY MONTH    gabapentin (NEURONTIN) capsule 300 mg  300 mg Oral QHS    guaiFENesin (ROBITUSSIN) 100 mg/5 mL oral liquid 200 mg  200 mg Oral Q4H PRN    hydrALAZINE (APRESOLINE) tablet 50 mg  50 mg Oral TID    HYDROcodone-acetaminophen (NORCO) 5-325 mg per tablet 1 Tab  1 Tab Oral Q6H PRN    levothyroxine (SYNTHROID) tablet 100 mcg  100 mcg Oral ACB    magnesium oxide (MAG-OX) tablet 400 mg  400 mg Oral DAILY    polyethylene glycol (MIRALAX) packet 17 g  17 g Oral DAILY    simethicone (MYLICON) tablet 654 mg  160 mg Oral Q8H PRN    HYDROmorphone (PF) (DILAUDID) injection 0.5 mg  0.5 mg IntraVENous Q4H PRN    ondansetron (ZOFRAN) injection 4 mg  4 mg IntraVENous Q4H PRN    heparin (porcine) injection 5,000 Units  5,000 Units SubCUTAneous Q8H    albuterol-ipratropium (DUO-NEB) 2.5 MG-0.5 MG/3 ML  3 mL Nebulization Q4H PRN    metroNIDAZOLE (FLAGYL) IVPB premix 500 mg  500 mg IntraVENous Q8H    lactobac ac& pc-s.therm-b.anim (ARACELIS Q/RISAQUAD)  1 Cap Oral DAILY    glucose chewable tablet 16 g  4 Tab Oral PRN    dextrose (D50W) injection syrg 12.5-25 g  12.5-25 g IntraVENous PRN    glucagon (GLUCAGEN) injection 1 mg  1 mg IntraMUSCular PRN    acetaminophen (TYLENOL) suppository 650 mg  650 mg Rectal Q6H PRN     ______________________________________________________________________  EXPECTED LENGTH OF STAY: 4d 21h  ACTUAL LENGTH OF STAY:          [address] Jhonny King NP

## 2017-04-26 NOTE — PROGRESS NOTES
Progress Note    Patient: Eduardo Lux MRN: [id number]  SSN: [id number]    YOB: 1933  Age: 80 y.o. Sex: female      Admit Date: 2017    Abdominal pain with leukocytosis    Subjective:     No acute surgical issues. Pt was planned for lap cholecystectomy. Family had discussion and given high risks due to heart and lung issues, family does not wish to pursue with operation at this time. WBC is still elevated but patient is clinically improving.     Objective:     Visit Vitals    /70 (BP 1 Location: Left arm, BP Patient Position: At rest)    Pulse 93    Temp 98.5 °F (36.9 °C)    Resp 17    Ht 5' 3\" (1.6 m)    Wt 224 lb 6.4 oz (101.8 kg)    SpO2 96%    Breastfeeding No    BMI 39.75 kg/m2       Temp (24hrs), Av.1 °F (37.3 °C), Min:98.5 °F (36.9 °C), Max:99.7 °F (37.6 °C)        Physical Exam:    Gen:  NAD  Pulm:  Unlabored  Abd:  S/ND/mild TTP in RUQ    Recent Results (from the past 24 hour(s))   GLUCOSE, POC    Collection Time: 17 11:33 AM   Result Value Ref Range    Glucose (POC) 151 (H) 65 - 100 mg/dL    Performed by Lacy Alvarez    GLUCOSE, POC    Collection Time: 17  4:40 PM   Result Value Ref Range    Glucose (POC) 145 (H) 65 - 100 mg/dL    Performed by Lacy Alvarez    GLUCOSE, POC    Collection Time: 17 11:20 PM   Result Value Ref Range    Glucose (POC) 162 (H) 65 - 100 mg/dL    Performed by Malena Escalera    METABOLIC PANEL, COMPREHENSIVE    Collection Time: 17  3:58 AM   Result Value Ref Range    Sodium 151 (H) 136 - 145 mmol/L    Potassium 3.6 3.5 - 5.1 mmol/L    Chloride 117 (H) 97 - 108 mmol/L    CO2 24 21 - 32 mmol/L    Anion gap 10 5 - 15 mmol/L    Glucose 193 (H) 65 - 100 mg/dL    BUN 21 (H) 6 - 20 MG/DL    Creatinine 0.92 0.55 - 1.02 MG/DL    BUN/Creatinine ratio 23 (H) 12 - 20      GFR est AA >60 >60 ml/min/1.73m2    GFR est non-AA 58 (L) >60 ml/min/1.73m2    Calcium 8.6 8.5 - 10.1 MG/DL    Bilirubin, total 0.4 0.2 - 1.0 MG/DL ALT (SGPT) 15 12 - 78 U/L    AST (SGOT) 13 (L) 15 - 37 U/L    Alk. phosphatase 92 45 - 117 U/L    Protein, total 5.9 (L) 6.4 - 8.2 g/dL    Albumin 2.8 (L) 3.5 - 5.0 g/dL    Globulin 3.1 2.0 - 4.0 g/dL    A-G Ratio 0.9 (L) 1.1 - 2.2     CBC WITH AUTOMATED DIFF    Collection Time: 04/26/17  3:58 AM   Result Value Ref Range    WBC 16.3 (H) 3.6 - 11.0 K/uL    RBC 3.87 3.80 - 5.20 M/uL    HGB 9.9 (L) 11.5 - 16.0 g/dL    HCT 30.4 (L) 35.0 - 47.0 %    MCV 78.6 (L) 80.0 - 99.0 FL    MCH 25.6 (L) 26.0 - 34.0 PG    MCHC 32.6 30.0 - 36.5 g/dL    RDW 15.9 (H) 11.5 - 14.5 %    PLATELET 839 204 - 258 K/uL    NEUTROPHILS 82 (H) 32 - 75 %    BAND NEUTROPHILS 7 (H) 0 - 6 %    LYMPHOCYTES 3 (L) 12 - 49 %    MONOCYTES 5 5 - 13 %    EOSINOPHILS 0 0 - 7 %    BASOPHILS 0 0 - 1 %    METAMYELOCYTES 3 (H) 0 %    ABS. NEUTROPHILS 14.5 (H) 1.8 - 8.0 K/UL    ABS. LYMPHOCYTES 0.5 (L) 0.8 - 3.5 K/UL    ABS. MONOCYTES 0.8 0.0 - 1.0 K/UL    ABS. EOSINOPHILS 0.0 0.0 - 0.4 K/UL    ABS. BASOPHILS 0.0 0.0 - 0.1 K/UL    DF MANUAL      PLATELET COMMENTS LARGE PLATELETS      RBC COMMENTS ANISOCYTOSIS  1+        RBC COMMENTS OVALOCYTES  PRESENT        RBC COMMENTS ALEX CELLS  PRESENT        RBC COMMENTS SCHISTOCYTES  PRESENT        WBC COMMENTS DOHLE BODIES     NUCLEATED RBC    Collection Time: 04/26/17  3:58 AM   Result Value Ref Range    NRBC 0.3 (H) 0  WBC    ABSOLUTE NRBC 0.05 (H) 0.00 - 0.01 K/uL    WBC CORRECTED FOR NR ADJUSTED FOR NUCLEATED RBC'S     GLUCOSE, POC    Collection Time: 04/26/17  5:30 AM   Result Value Ref Range    Glucose (POC) 171 (H) 65 - 100 mg/dL    Performed by Brittany Heading              Assessment:     Hospital Problems  Date Reviewed: 4/22/2017          Codes Class Noted POA    * (Principal)Acute delirium ICD-10-CM: R41.0  ICD-9-CM: 780.09  4/22/2017 Yes              Plan/Recommendations/Medical Decision Making:     - Abdominal pain:  Will cancel cholecystectomy for today.   Given high risk, I think the family's decision to not have the operation is reasonable as pt is also clinically improving.   - Will start TPN as speech pathology does not feel patient is able to tolerate oral intake  - Continue IV antibiotic for now    Signed By: Mili Salguero MD     April 26, 2017

## 2017-04-26 NOTE — PROGRESS NOTES
Problem: Sepsis: Day 5  Goal: Discharge Planning  Outcome: Progressing Towards Goal  Discharge plan in place

## 2017-04-26 NOTE — PROGRESS NOTES
Problem: Dysphagia (Adult)  Goal: *Acute Goals and Plan of Care (Insert Text)  Speech Therapy Goals  Initiated 4/25/2017   1) Patient will participate in reevaluation of swallow within 7 days. SPEECH LANGUAGE PATHOLOGY DYSPHAGIA TREATMENT  Patient: Masood Duque (23 y.o. female)  Date: 4/26/2017  Diagnosis: Acute delirium  GALLSTONES Acute delirium  Procedure(s) (LRB):  CHOLECYSTECTOMY LAPAROSCOPIC POSSIBLE OPEN  (N/A)    Precautions: swallow        ASSESSMENT:  Patient continues to present with moderate-severe oral-pharyngeal phase dysphagia characterized by delayed bolus propulsion with oral holding of pureed, delayed swallow initiation and decreased laryngeal elevation via palpation. Improved bolus acceptance as patient removing bolus from spoon this date. Patient with no overt s/s aspiration. Cognitive status continues to be barrier for po intake at this time. Patient appeared to be tolerating small amounts of ice and applesauce but she is not ready for diet at this time. Progression toward goals:  [ ]         Improving appropriately and progressing toward goals  [X]         Improving slowly and progressing toward goals  [ ]         Not making progress toward goals and plan of care will be adjusted       PLAN:  Recommendations and Planned Interventions:  Continue with ice chip offering and small bites of pureed as patient actively participating. Monitor for s/s aspiration. SLP to continue to follow for po readiness  Patient continues to benefit from skilled intervention to address the above impairments. Continue treatment per established plan of care. Discharge Recommendations:  Skilled Nursing Facility       SUBJECTIVE:   Patient stated good morning.       OBJECTIVE:   Cognitive and Communication Status:  Neurologic State: Alert, Confused  Orientation Level: Oriented to person  Cognition: Decreased command following     Perseveration: No perseveration noted  Safety/Judgement: Decreased awareness of environment, Decreased awareness of need for assistance, Decreased awareness of need for safety, Lack of insight into deficits  Dysphagia Treatment:  Oral Assessment:  Oral Assessment  Labial: No impairment  Dentition: Edentulous  Oral Hygiene: moist oral mucosa  Lingual: No impairment  Velum: Unable to visualize  Mandible: No impairment  P.O. Trials:  Patient Position: upright in bed  Vocal quality prior to P.O.: Low volume  Consistency Presented: Ice chips;Puree  How Presented: SLP-fed/presented;Spoon     Bolus Acceptance: No impairment  Bolus Formation/Control: Impaired  Type of Impairment: Delayed;Piecemeal (oral holding)  Propulsion: Delayed (# of seconds)  Oral Residue: None  Initiation of Swallow: Delayed (# of seconds)  Laryngeal Elevation: Decreased  Aspiration Signs/Symptoms: None  Pharyngeal Phase Characteristics:  (delayed swallow initiation; decreased elevation)  Effective Modifications: Small sips and bites  Cues for Modifications: Moderate        Oral Phase Severity: Moderate-severe  Pharyngeal Phase Severity : Moderate-severe                                                              Pain:  Pain Scale 1: Numeric (0 - 10)  Pain Intensity 1: 0     After treatment:   [ ]              Patient left in no apparent distress sitting up in chair  [X]              Patient left in no apparent distress in bed  [X]              Call bell left within reach  [ ]              Nursing notified  [X]              Caregiver present  [ ]              Bed alarm activated      COMMUNICATION/EDUCATION:   The patients plan of care including recommendations, planned interventions, and recommended diet changes were discussed with: daughter. [ ]              Posted safety precautions in patient's room.      ANGELA Mota  Time Calculation: 13 mins

## 2017-04-26 NOTE — PROGRESS NOTES
04/26/17 0800   Patient Observation   Hourly Rounds Yes  (Q1hr rounds)   Pain 1   Pain Scale 1 Numeric (0 - 10)   Pain Intensity 1 0   Patient Stated Pain Goal 0   Intake (mL)   P.O. 30 mL   Peripheral IV 04/21/17 Right Forearm   Placement Date/Time: 04/21/17 1704   Number of Attempts: 1  Inserted By: Coretta Delgado  Size: 22 G  Orientation: Right  Location: Forearm   Site Assessment Clean, dry, & intact   Phlebitis Assessment 0   Infiltration Assessment 0   Dressing Status Clean, dry, & intact   Dressing Type Transparent;Tape   Hub Color/Line Status Blue   Alcohol Cap Used Yes   Venous Access Device central line 04/24/17 Other (comment)   Placement Date/Time: 04/24/17 1449   Device Name/Type: central line  Special Properties: CT injectable;MRI safe  Number of Attempts: 1  Inserted By: Dr. Sukhjinder Jansen  Type: Triple  Location: (c) Other (comment)  Size: 7.0 FR  CVC Insertion Practices: Chlo. .. Central Line Being Utilized Yes   Criteria for Appropriate Use Irritant/vesicant medication   Site Assessment Clean, dry, & intact   Date of Last Dressing Change 04/24/17   Dressing Status Clean, dry, & intact   Dressing Type Transparent   Action Taken Open ports on tubing capped   Positive Blood Return (Medial Site) Yes   Action Taken (Medial Site) Flushed   Positive Blood Return (Lateral Site) Yes   Action Taken (Lateral Site) Flushed   Alcohol Cap Used Yes     Patient has triple lumen central line in neck. Informed CCL that patient's line as added to connect care as a cenous access device central line. All three ports flushed, with positive blood returns, capped with chg green caps.

## 2017-04-27 PROBLEM — Z71.89 GOALS OF CARE, COUNSELING/DISCUSSION: Status: ACTIVE | Noted: 2017-04-27

## 2017-04-27 PROBLEM — R06.02 SHORTNESS OF BREATH: Status: ACTIVE | Noted: 2017-04-27

## 2017-04-27 PROBLEM — R53.81 DEBILITY: Status: ACTIVE | Noted: 2017-04-27

## 2017-04-27 PROBLEM — R41.0 DELIRIUM: Status: ACTIVE | Noted: 2017-04-27

## 2017-04-27 LAB
ALBUMIN SERPL BCP-MCNC: 2.5 G/DL (ref 3.5–5)
ALBUMIN/GLOB SERPL: 0.6 {RATIO} (ref 1.1–2.2)
ALP SERPL-CCNC: 85 U/L (ref 45–117)
ALT SERPL-CCNC: 13 U/L (ref 12–78)
ANION GAP BLD CALC-SCNC: 5 MMOL/L (ref 5–15)
AST SERPL W P-5'-P-CCNC: 11 U/L (ref 15–37)
BACTERIA SPEC CULT: ABNORMAL
BILIRUB SERPL-MCNC: 0.3 MG/DL (ref 0.2–1)
BUN SERPL-MCNC: 29 MG/DL (ref 6–20)
BUN/CREAT SERPL: 26 (ref 12–20)
CALCIUM SERPL-MCNC: 8.9 MG/DL (ref 8.5–10.1)
CHLORIDE SERPL-SCNC: 118 MMOL/L (ref 97–108)
CO2 SERPL-SCNC: 28 MMOL/L (ref 21–32)
CREAT SERPL-MCNC: 1.12 MG/DL (ref 0.55–1.02)
ERYTHROCYTE [DISTWIDTH] IN BLOOD BY AUTOMATED COUNT: 16.2 % (ref 11.5–14.5)
GLOBULIN SER CALC-MCNC: 4.1 G/DL (ref 2–4)
GLUCOSE BLD STRIP.AUTO-MCNC: 194 MG/DL (ref 65–100)
GLUCOSE BLD STRIP.AUTO-MCNC: 243 MG/DL (ref 65–100)
GLUCOSE BLD STRIP.AUTO-MCNC: 257 MG/DL (ref 65–100)
GLUCOSE BLD STRIP.AUTO-MCNC: 285 MG/DL (ref 65–100)
GLUCOSE SERPL-MCNC: 212 MG/DL (ref 65–100)
HCT VFR BLD AUTO: 28.6 % (ref 35–47)
HGB BLD-MCNC: 9.1 G/DL (ref 11.5–16)
MCH RBC QN AUTO: 25 PG (ref 26–34)
MCHC RBC AUTO-ENTMCNC: 31.8 G/DL (ref 30–36.5)
MCV RBC AUTO: 78.6 FL (ref 80–99)
PLATELET # BLD AUTO: 238 K/UL (ref 150–400)
POTASSIUM SERPL-SCNC: 3.4 MMOL/L (ref 3.5–5.1)
PROT SERPL-MCNC: 6.6 G/DL (ref 6.4–8.2)
RBC # BLD AUTO: 3.64 M/UL (ref 3.8–5.2)
SERVICE CMNT-IMP: ABNORMAL
SODIUM SERPL-SCNC: 151 MMOL/L (ref 136–145)
WBC # BLD AUTO: 18.8 K/UL (ref 3.6–11)

## 2017-04-27 PROCEDURE — 82962 GLUCOSE BLOOD TEST: CPT

## 2017-04-27 PROCEDURE — 85027 COMPLETE CBC AUTOMATED: CPT | Performed by: SURGERY

## 2017-04-27 PROCEDURE — 92526 ORAL FUNCTION THERAPY: CPT | Performed by: SPEECH-LANGUAGE PATHOLOGIST

## 2017-04-27 PROCEDURE — 74011636637 HC RX REV CODE- 636/637: Performed by: NURSE PRACTITIONER

## 2017-04-27 PROCEDURE — 74011000250 HC RX REV CODE- 250: Performed by: INTERNAL MEDICINE

## 2017-04-27 PROCEDURE — 74011250636 HC RX REV CODE- 250/636: Performed by: SURGERY

## 2017-04-27 PROCEDURE — 74011000250 HC RX REV CODE- 250: Performed by: NURSE PRACTITIONER

## 2017-04-27 PROCEDURE — 80053 COMPREHEN METABOLIC PANEL: CPT | Performed by: SURGERY

## 2017-04-27 PROCEDURE — 76450000000

## 2017-04-27 PROCEDURE — 74011000258 HC RX REV CODE- 258: Performed by: NURSE PRACTITIONER

## 2017-04-27 PROCEDURE — 74011250636 HC RX REV CODE- 250/636: Performed by: NURSE PRACTITIONER

## 2017-04-27 PROCEDURE — 36415 COLL VENOUS BLD VENIPUNCTURE: CPT | Performed by: SURGERY

## 2017-04-27 PROCEDURE — 65660000000 HC RM CCU STEPDOWN

## 2017-04-27 PROCEDURE — 74011250637 HC RX REV CODE- 250/637: Performed by: INTERNAL MEDICINE

## 2017-04-27 PROCEDURE — 74011250636 HC RX REV CODE- 250/636: Performed by: INTERNAL MEDICINE

## 2017-04-27 RX ADMIN — HYDRALAZINE HYDROCHLORIDE 50 MG: 50 TABLET, FILM COATED ORAL at 10:17

## 2017-04-27 RX ADMIN — CITALOPRAM HYDROBROMIDE 20 MG: 20 TABLET ORAL at 10:17

## 2017-04-27 RX ADMIN — LEVOFLOXACIN 500 MG: 5 INJECTION, SOLUTION INTRAVENOUS at 21:33

## 2017-04-27 RX ADMIN — METRONIDAZOLE 500 MG: 500 INJECTION, SOLUTION INTRAVENOUS at 04:38

## 2017-04-27 RX ADMIN — INSULIN LISPRO 3 UNITS: 100 INJECTION, SOLUTION INTRAVENOUS; SUBCUTANEOUS at 07:31

## 2017-04-27 RX ADMIN — HEPARIN SODIUM 5000 UNITS: 5000 INJECTION, SOLUTION INTRAVENOUS; SUBCUTANEOUS at 20:37

## 2017-04-27 RX ADMIN — I.V. FAT EMULSION 500 ML: 20 EMULSION INTRAVENOUS at 18:59

## 2017-04-27 RX ADMIN — METRONIDAZOLE 500 MG: 500 INJECTION, SOLUTION INTRAVENOUS at 12:29

## 2017-04-27 RX ADMIN — Medication 1 CAPSULE: at 10:17

## 2017-04-27 RX ADMIN — Medication 5 ML: at 07:31

## 2017-04-27 RX ADMIN — Medication 5 ML: at 14:50

## 2017-04-27 RX ADMIN — Medication 5 ML: at 21:45

## 2017-04-27 RX ADMIN — HYDROMORPHONE HYDROCHLORIDE 0.5 MG: 1 INJECTION, SOLUTION INTRAMUSCULAR; INTRAVENOUS; SUBCUTANEOUS at 04:07

## 2017-04-27 RX ADMIN — ACETAMINOPHEN 500 MG: 500 TABLET, FILM COATED ORAL at 15:37

## 2017-04-27 RX ADMIN — HYDRALAZINE HYDROCHLORIDE 50 MG: 50 TABLET, FILM COATED ORAL at 15:28

## 2017-04-27 RX ADMIN — INSULIN LISPRO 7 UNITS: 100 INJECTION, SOLUTION INTRAVENOUS; SUBCUTANEOUS at 18:43

## 2017-04-27 RX ADMIN — INSULIN LISPRO 4 UNITS: 100 INJECTION, SOLUTION INTRAVENOUS; SUBCUTANEOUS at 01:58

## 2017-04-27 RX ADMIN — FAMOTIDINE: 10 INJECTION, SOLUTION INTRAVENOUS at 18:59

## 2017-04-27 RX ADMIN — INSULIN LISPRO 7 UNITS: 100 INJECTION, SOLUTION INTRAVENOUS; SUBCUTANEOUS at 12:28

## 2017-04-27 RX ADMIN — HEPARIN SODIUM 5000 UNITS: 5000 INJECTION, SOLUTION INTRAVENOUS; SUBCUTANEOUS at 04:39

## 2017-04-27 RX ADMIN — HEPARIN SODIUM 5000 UNITS: 5000 INJECTION, SOLUTION INTRAVENOUS; SUBCUTANEOUS at 12:28

## 2017-04-27 RX ADMIN — LEVOTHYROXINE SODIUM 100 MCG: 100 TABLET ORAL at 10:17

## 2017-04-27 RX ADMIN — METRONIDAZOLE 500 MG: 500 INJECTION, SOLUTION INTRAVENOUS at 20:37

## 2017-04-27 RX ADMIN — Medication 400 MG: at 10:17

## 2017-04-27 NOTE — PROGRESS NOTES
Daily Progress Note  [facility] General Surgery at [address] Date: 2017    Subjective:     Last 24 hrs: Sleeping, awakens briefly and nods head yes/no to questions. Denies pain, + flatus. WBC trending up, 18.8. On levaquin/flagyl, afebrile. Working with speech, on TPN. Objective:     Blood pressure 134/69, pulse 81, temperature 98.4 °F (36.9 °C), resp. rate 17, height 5' 3\" (1.6 m), weight 101.9 kg (224 lb 11.2 oz), SpO2 97 %, not currently breastfeeding. Temp (24hrs), Av.2 °F (36.8 °C), Min:97.9 °F (36.6 °C), Max:98.9 °F (37.2 °C)      _____________________  Physical Exam:     Sleeping, awakens briefly. No acute distress. Cardiovascular: RRR,   Lungs:CTAB   Abdomen: + BS, soft, obese, NT. Assessment:   Principal Problem:    Acute delirium (2017)    Active Problems:    Shortness of breath (2017)      Delirium (2017)      Debility (2017)      Goals of care, counseling/discussion (2017)      Acute cholecystitis - high risk surgical candidate, family declined surgery. IR was unable to place cholecystostomy.         Plan:     Continue supportive care with abx  TPN advanced per RD recs, insulin added  Labs in am  Following        Shana Stoddard, ACNP - [address] [facility] Surgery at [facility] [address],  [place], [address] [facility], [facility] [address] [facility]  [phone]    Data Review:    Recent Labs      17   5618  17   0358  17   0255   WBC  18.8*  16.3*  16.2*   HGB  9.1*  9.9*  9.2*   HCT  28.6*  30.4*  29.7*   PLT  238  283  279     Recent Labs      17   0651  17   0358  17   0255   NA  151*  151*  149*   K  3.4*  3.6  3.7   CL  118*  117*  115*   CO2  28  24  24   GLU  212*  193*  150*   BUN  29*  21*  25*   CREA  1.12*  0.92  0.95   CA  8.9  8.6  8.5   ALB  2.5*  2.8*  2.8*   TBILI  0.3  0.4  0.5   SGOT  11*  13*  13*   ALT  13  15  17     No results for input(s): AML, LPSE in the last 72 hours.        ______________________  Medications:    Current Facility-Administered Medications   Medication Dose Route Frequency    TPN ADULT - CENTRAL AA 5% D20% W/ ELECTROLYTES AND CA   IntraVENous CONTINUOUS    fat emulsion 20% (LIPOSYN, INTRALIPID) infusion 500 mL  500 mL IntraVENous EVERY MON & TH    TPN ADULT - CENTRAL   IntraVENous CONTINUOUS    TPN ADULT - CENTRAL AA 5% D20% W/ ELECTROLYTES AND CA   IntraVENous CONTINUOUS    levoFLOXacin (LEVAQUIN) 500 mg in D5W IVPB  500 mg IntraVENous Q24H    amLODIPine (NORVASC) tablet 5 mg  5 mg Oral QHS    hydrALAZINE (APRESOLINE) 20 mg/mL injection 10 mg  10 mg IntraVENous Q6H PRN    insulin lispro (HUMALOG) injection   SubCUTAneous Q6H    sodium chloride (NS) flush 5 mL  5 mL InterCATHeter PRN    sodium chloride (NS) flush 5 mL  5 mL InterCATHeter Q8H    acetaminophen (TYLENOL) tablet 500 mg  500 mg Oral Q8H PRN    citalopram (CELEXA) tablet 20 mg  20 mg Oral DAILY    donepezil (ARICEPT) tablet 5 mg  5 mg Oral QHS    ergocalciferol (ERGOCALCIFEROL) capsule 100,000 Units  100,000 Units Oral EVERY MONTH    gabapentin (NEURONTIN) capsule 300 mg  300 mg Oral QHS    guaiFENesin (ROBITUSSIN) 100 mg/5 mL oral liquid 200 mg  200 mg Oral Q4H PRN    hydrALAZINE (APRESOLINE) tablet 50 mg  50 mg Oral TID    HYDROcodone-acetaminophen (NORCO) 5-325 mg per tablet 1 Tab  1 Tab Oral Q6H PRN    levothyroxine (SYNTHROID) tablet 100 mcg  100 mcg Oral ACB    magnesium oxide (MAG-OX) tablet 400 mg  400 mg Oral DAILY    polyethylene glycol (MIRALAX) packet 17 g  17 g Oral DAILY    simethicone (MYLICON) tablet 426 mg  160 mg Oral Q8H PRN    HYDROmorphone (PF) (DILAUDID) injection 0.5 mg  0.5 mg IntraVENous Q4H PRN    ondansetron (ZOFRAN) injection 4 mg  4 mg IntraVENous Q4H PRN    heparin (porcine) injection 5,000 Units  5,000 Units SubCUTAneous Q8H    albuterol-ipratropium (DUO-NEB) 2.5 MG-0.5 MG/3 ML  3 mL Nebulization Q4H PRN    metroNIDAZOLE (FLAGYL) IVPB premix 500 mg  500 mg IntraVENous Q8H    lactobac ac& pc-s.therm-b.anim (ARACELIS Q/RISAQUAD)  1 Cap Oral DAILY    glucose chewable tablet 16 g  4 Tab Oral PRN    dextrose (D50W) injection syrg 12.5-25 g  12.5-25 g IntraVENous PRN    glucagon (GLUCAGEN) injection 1 mg  1 mg IntraMUSCular PRN    acetaminophen (TYLENOL) suppository 650 mg  650 mg Rectal Q6H PRN         ADDENDUM:  Kerri Vences MD  Agree with NP assessment and plan. Continue TPN for now. Continue antibiotic. WBC trending up a bit. No acute indication for cholecystectomy. Continue to monitor.

## 2017-04-27 NOTE — PROGRESS NOTES
Patient experiences difficulty swallowing active diet (clear liquids). RN spoke with Speech Therapist, Dorene Pedroza. Dorene Pedroza indicated the Speech recommendation has been to keep the patient NPO with medications crushed in applesauce. Unsure why diet is now clear liquids. RN will discuss with MD. Tanisha Esparza with Dr. Bhakti Clemons. Agreed with Speech diet recommendation: NPO with medication crushed in applesauce.

## 2017-04-27 NOTE — PROGRESS NOTES
Hospitalist Progress Note  Letty Barton NP  Office: [phone]  Cell: [phone]      Date of Service:  2017  NAME:  Андрей Patel  :  3/8/1933  MRN:  [id number]      Admission Summary:    80year old female with history of prior CVA with residual right-sided weakness, HTN, DM-2, dementia, chronic diastolic heart failure, hypothyroidism presented on 17 with altered mental status and poorly characterized right upper quadrant abdominal pain. She was noted to have findings concerning for acute cholecystitis on CT Abd/Pelvis. Interval history / Subjective:      Family has declined surgery  Patient is alert today but not oriented. Keeps repeating herself about the lawson care she is receiving. Very pleasant. White count is rising again, 18.8 today. Palliative care meeting scheduled for tomorrow morning at 11 AM.    Assessment & Plan:     1. Delirium, acute metabolic encephalopathy  -has hit a plateau at this point , no further improvement seen    - suspect secondary to sepsis in setting of possible acute cholecystitis   - CT head  - Chronic microvascular ischemic change. No acute intracranial process. - MRI brain  - No evidence for acute infarction. Mild to moderate bilateral subcortical and periventricular increased T2/FLAIR signal abnormalities are nonspecific but may represent changes of chronic small  vessel ischemic disease.   - treating underlying conditions as below   - Family reports more lethargic this afternoon     2. Suspected sepsis secondary to acute cholecystitis   - with tachycardia, leukocytosis upon admission ( white count is rising again)   - CT Abd/Pelvis  - Findings are suggestive of acute cholecystitis. - Abd U/S  - Distended gallbladder with gallstones; no biliary ductal dilatation. No right hydronephrosis.    - blood cultures  - gram positive cocci in clusters in 1/2 bottles drawn - coagulase negative Staphylococcus   - on empiric levofloxacin, Flagyl, added vancomycin due to GPCs in clusters as above, however, this is likely contaminant , Vanc dc'd   - NGTD on repeat cultures 4/26   - HIDA on 4/24 with cystic duct obstruction and possible acute cholecystitis   - Attempted to place cholecystostomy tube on 4/24 and unable to do so. Family declines surgery as patient is getting better with ABX and support    3. Possible STACEY  -resolved   - unclear baseline Cr, possibly some underlying CKD, Cr elevated to 1.80 upon admission, suspect prerenal azotemia and sepsis contributing, seemingly improving   - IV fluid hydration    4. Prior CVA    - holding home Plavix in setting of possible need for intervention     5. Chronic diastolic heart failure   - does not currently appear in acute exacerbation of this diagnosis   - unclear baseline NYHA classification, unable to determine at this juncture given her mentation   - on hydralazine    6. Dementia   - continue home Aricept, Celexa    7. DM-2   - sliding scale insulin coverage increased to resistant, stable   - continue home gabapentin    8. HTN   - home amlodipine, hydralazine  -increased Amlodipine dose on 4/26- BP minimally improved on 4/27   - monitor     9. Hypothyroidism   - resume home levothyroxine    10. Wheezing   - predominantly upper airways, suspect some slight reactive airways   - prn DuoNebs    11. Morbid Obesity  - BMI 37.96    12 Hypernatremia  - TPN started per general surgery   - accu checks AC & HS with sliding scale insulin ordered  -monitor      13. Malnutrition in the setting of dysphagia  -TPN to start tonight  -??? Dietary put recs in for feeds, but no conversation that I know of with family? ?    Code status: DNR  DVT prophylaxis: heparin    Care Plan discussed with: Patient/Family and Nurse.    Disposition: TBD     Hospital Problems  Date Reviewed: 4/22/2017          Codes Class Noted POA    Shortness of breath ICD-10-CM: R06.02  ICD-9-CM: 786.05  4/27/2017 Unknown        Delirium ICD-10-CM: R41.0  ICD-9-CM: 780.09  4/27/2017 Unknown        Debility ICD-10-CM: R53.81  ICD-9-CM: 799.3  4/27/2017 Unknown        Goals of care, counseling/discussion ICD-10-CM: Z71.89  ICD-9-CM: V65.49  4/27/2017 Unknown        * (Principal)Acute delirium ICD-10-CM: R41.0  ICD-9-CM: 780.09  4/22/2017 Yes                Review of Systems:   Review of systems not obtained due to patient factors. + pain to RUQ with palpation      Vital Signs:    Last 24hrs VS reviewed since prior progress note. Most recent are:  Visit Vitals    /70 (BP 1 Location: Left arm, BP Patient Position: At rest)    Pulse 78    Temp 98.3 °F (36.8 °C)    Resp 19    Ht 5' 3\" (1.6 m)    Wt 101.9 kg (224 lb 11.2 oz)    SpO2 97%    Breastfeeding No    BMI 39.8 kg/m2         Intake/Output Summary (Last 24 hours) at 04/27/17 1731  Last data filed at 04/26/17 1900   Gross per 24 hour   Intake                0 ml   Output              300 ml   Net             -300 ml        Physical Examination:             Constitutional:  No acute distress, cooperative, mumbling words   ENT:  Oral mucous slightly dry, oropharynx benign. Neck supple    Resp:  CTA, diminished bases blt   CV:  Regular rhythm, normal rate, no murmurs, gallops, rubs    GI:  Soft, non distended, pain with palpation to RUQ , normoactive bowel sounds, no hepatosplenomegaly     Musculoskeletal:  No edema, warm, 2+ pulses throughout    Neurologic:  Moves all extremities.   AAOx1-2 (self only), otherwise, seemingly non-focal examination            Data Review:    Review and/or order of clinical lab test on 4/25      Labs:     Recent Labs      04/27/17   0651  04/26/17   0358   WBC  18.8*  16.3*   HGB  9.1*  9.9*   HCT  28.6*  30.4*   PLT  238  283     Recent Labs      04/27/17   0651  04/26/17   0358  04/25/17   0255   NA  151*  151*  149*   K  3.4*  3.6  3.7   CL  118*  117*  115*   CO2  28  24  24 BUN  29*  21*  25*   CREA  1.12*  0.92  0.95   GLU  212*  193*  150*   CA  8.9  8.6  8.5     Recent Labs      04/27/17   0651  04/26/17   0358  04/25/17   0255   SGOT  11*  13*  13*   ALT  13  15  17   AP  85  92  85   TBILI  0.3  0.4  0.5   TP  6.6  5.9*  6.2*   ALB  2.5*  2.8*  2.8*   GLOB  4.1*  3.1  3.4     No results for input(s): INR, PTP, APTT in the last 72 hours. No lab exists for component: INREXT, INREXT   No results for input(s): FE, TIBC, PSAT, FERR in the last 72 hours. No results found for: FOL, RBCF   No results for input(s): PH, PCO2, PO2 in the last 72 hours. No results for input(s): CPK, CKNDX, TROIQ in the last 72 hours.     No lab exists for component: CPKMB  Lab Results   Component Value Date/Time    Cholesterol, total 142 01/28/2015 03:59 AM    HDL Cholesterol 54 01/28/2015 03:59 AM    LDL, calculated 76.4 01/28/2015 03:59 AM    Triglyceride 58 01/28/2015 03:59 AM    CHOL/HDL Ratio 2.6 01/28/2015 03:59 AM     Lab Results   Component Value Date/Time    Glucose (POC) 285 04/27/2017 11:47 AM    Glucose (POC) 194 04/27/2017 06:50 AM    Glucose (POC) 243 04/27/2017 01:07 AM    Glucose (POC) 130 04/26/2017 05:16 PM    Glucose (POC) 198 04/26/2017 11:51 AM     Lab Results   Component Value Date/Time    Color DARK YELLOW 04/21/2017 03:57 PM    Appearance CLOUDY 04/21/2017 03:57 PM    Specific gravity 1.024 04/21/2017 03:57 PM    pH (UA) 5.0 04/21/2017 03:57 PM    Protein 300 04/21/2017 03:57 PM    Glucose NEGATIVE  04/21/2017 03:57 PM    Ketone TRACE 04/21/2017 03:57 PM    Bilirubin NEGATIVE  01/27/2015 12:20 PM    Urobilinogen 0.2 04/21/2017 03:57 PM    Nitrites NEGATIVE  04/21/2017 03:57 PM    Leukocyte Esterase NEGATIVE  04/21/2017 03:57 PM    Epithelial cells FEW 04/21/2017 03:57 PM    Bacteria NEGATIVE  04/21/2017 03:57 PM    WBC 0-4 04/21/2017 03:57 PM    RBC 5-10 04/21/2017 03:57 PM         Medications Reviewed:     Current Facility-Administered Medications   Medication Dose Route Frequency    fat emulsion 20% (LIPOSYN, INTRALIPID) infusion 500 mL  500 mL IntraVENous EVERY MON & TH    TPN ADULT - CENTRAL   IntraVENous CONTINUOUS    TPN ADULT - CENTRAL AA 5% D20% W/ ELECTROLYTES AND CA   IntraVENous CONTINUOUS    levoFLOXacin (LEVAQUIN) 500 mg in D5W IVPB  500 mg IntraVENous Q24H    amLODIPine (NORVASC) tablet 5 mg  5 mg Oral QHS    hydrALAZINE (APRESOLINE) 20 mg/mL injection 10 mg  10 mg IntraVENous Q6H PRN    insulin lispro (HUMALOG) injection   SubCUTAneous Q6H    sodium chloride (NS) flush 5 mL  5 mL InterCATHeter PRN    sodium chloride (NS) flush 5 mL  5 mL InterCATHeter Q8H    acetaminophen (TYLENOL) tablet 500 mg  500 mg Oral Q8H PRN    citalopram (CELEXA) tablet 20 mg  20 mg Oral DAILY    donepezil (ARICEPT) tablet 5 mg  5 mg Oral QHS    ergocalciferol (ERGOCALCIFEROL) capsule 100,000 Units  100,000 Units Oral EVERY MONTH    gabapentin (NEURONTIN) capsule 300 mg  300 mg Oral QHS    guaiFENesin (ROBITUSSIN) 100 mg/5 mL oral liquid 200 mg  200 mg Oral Q4H PRN    hydrALAZINE (APRESOLINE) tablet 50 mg  50 mg Oral TID    HYDROcodone-acetaminophen (NORCO) 5-325 mg per tablet 1 Tab  1 Tab Oral Q6H PRN    levothyroxine (SYNTHROID) tablet 100 mcg  100 mcg Oral ACB    magnesium oxide (MAG-OX) tablet 400 mg  400 mg Oral DAILY    polyethylene glycol (MIRALAX) packet 17 g  17 g Oral DAILY    simethicone (MYLICON) tablet 889 mg  160 mg Oral Q8H PRN    HYDROmorphone (PF) (DILAUDID) injection 0.5 mg  0.5 mg IntraVENous Q4H PRN    ondansetron (ZOFRAN) injection 4 mg  4 mg IntraVENous Q4H PRN    heparin (porcine) injection 5,000 Units  5,000 Units SubCUTAneous Q8H    albuterol-ipratropium (DUO-NEB) 2.5 MG-0.5 MG/3 ML  3 mL Nebulization Q4H PRN    metroNIDAZOLE (FLAGYL) IVPB premix 500 mg  500 mg IntraVENous Q8H    lactobac ac& pc-s.therm-b.anim (ARACELIS Q/RISAQUAD)  1 Cap Oral DAILY    glucose chewable tablet 16 g  4 Tab Oral PRN    dextrose (D50W) injection syrg 12.5-25 g 12.5-25 g IntraVENous PRN    glucagon (GLUCAGEN) injection 1 mg  1 mg IntraMUSCular PRN    acetaminophen (TYLENOL) suppository 650 mg  650 mg Rectal Q6H PRN     ______________________________________________________________________  EXPECTED LENGTH OF STAY: 4d 21h  ACTUAL LENGTH OF STAY:          5                 Danya Schwab, NP

## 2017-04-27 NOTE — PROGRESS NOTES
Problem: Dysphagia (Adult)  Goal: *Acute Goals and Plan of Care (Insert Text)  Speech Therapy Goals  Initiated 4/27/2017   1. Patient will tolerate dysphagia 1/honey thick liquid diet without s/s of aspiration within 7 days   2. Patient will tolerate thin liquid and solid trials with SLP to determine safety for diet upgrade without s/s of aspiration within 7 days   Initiated 4/25/2017   1) Patient will participate in reevaluation of swallow within 7 days. MET 4/27/2017   SPEECH LANGUAGE PATHOLOGY DYSPHAGIA TREATMENT  Patient: Reji Morales (37 y.o. female)  Date: 4/27/2017  Diagnosis: Acute delirium  GALLSTONES Acute delirium  Procedure(s) (LRB):  CHOLECYSTECTOMY LAPAROSCOPIC POSSIBLE OPEN  (N/A)    Precautions: aspiration, fall        ASSESSMENT:  Patient showing small improvements in function, now with a mod/severe oral and moderate pharyngeal dysphagia. Much improved bolus acceptance with active acceptance of all trials today. Poor bolus manipulation formation and control with rapid propulsion with thin and nectar thick liquids resulting in multiple uncoordinated swallows and repeated throat clearing. Improved tolerance of honey thick liquids and purees related to increased viscosity of liquids improving bolus control and cohesion. Continues to be at increased risk for aspiration, however tolerating purees/honey thick liquids without s/s of aspiration at this time. Will need close monitoring of tolerance. Suspect intake will remain variable due to mental status. Progression toward goals:  [ ]         Improving appropriately and progressing toward goals  [X]         Improving slowly and progressing toward goals  [ ]         Not making progress toward goals and plan of care will be adjusted       PLAN:  Recommendations and Planned Interventions:  --recommend dysphagia 1/honey thick liquid diet with strict aspiration precautions. Only feed if fully alert and actively accepting.   Stop with any s/s of aspiration or during periods of decreased mentation  --SLP to follow for diet tolerance and upgrade if mental status improves. Patient continues to benefit from skilled intervention to address the above impairments. Continue treatment per established plan of care. Discharge Recommendations: To Be Determined       SUBJECTIVE:   Patient stated oh bless you. When told I'd brought her something to eat. OBJECTIVE:   Cognitive and Communication Status:  Neurologic State: Alert, Confused  Orientation Level: Oriented to person, Disoriented to place, Disoriented to situation, Disoriented to time  Cognition: Decreased attention/concentration, Decreased command following, Memory loss, Poor safety awareness  Perception: Appears intact  Perseveration: No perseveration noted  Safety/Judgement: Decreased awareness of environment, Decreased awareness of need for assistance, Decreased awareness of need for safety, Decreased insight into deficits  Dysphagia Treatment:  Oral Assessment:  Oral Assessment  Labial: No impairment  Dentition: Edentulous  Oral Hygiene: moist mucosa   Lingual:  (did not follow commands to assess)  P.O. Trials:  Patient Position: upright in bed   Vocal quality prior to P.O.: No impairment  Consistency Presented: Thin liquid; Nectar thick liquid;Honey thick liquid;Puree  How Presented: SLP-fed/presented;Cup/sip;Spoon;Straw     Bolus Acceptance: No impairment (much improved today )  Bolus Formation/Control: Impaired  Type of Impairment: Delayed;Premature spillage (decreased control; rapid propulsion with thins )  Propulsion: Delayed (# of seconds) (moderate )  Oral Residue: None  Initiation of Swallow: Delayed (# of seconds)  Laryngeal Elevation: Decreased;Weak  Aspiration Signs/Symptoms: Clear throat (and poor coordination with thin and nectar)  Pharyngeal Phase Characteristics: Audible swallow;Effortful swallow;Multiple swallows; Suspected pharyngeal residue (poor coordination with thin and nectar)  Effective Modifications:  (thicker liquids improved bolus control)  Cues for Modifications: Maximal  Comments: improved bolus acceptance, still with decreased tolerance of PO      Oral Phase Severity: Moderate-severe  Pharyngeal Phase Severity : Moderate                                                                                               Pain:  Pain Scale 1: Adult Nonverbal Pain Scale  Pain Intensity 1: 0     After treatment:   [ ]              Patient left in no apparent distress sitting up in chair  [X]              Patient left in no apparent distress in bed  [X]              Call bell left within reach  [X]              Nursing notified  [ ]              Caregiver present  [ ]              Bed alarm activated      COMMUNICATION/EDUCATION:         The patients plan of care including recommendations, planned interventions, and recommended diet changes were discussed with: Registered Nurse. [X]              Posted safety precautions in patient's room. Enid Vasquez M.CD.  CCC-SLP   Time Calculation: 12 mins

## 2017-04-27 NOTE — PROGRESS NOTES
Occupational Therapy Screening:  Services are not indicated at this time. [facility] based on results obtained during the nursing admission assessment. The patients chart was reviewed and the patient is not appropriate for a skilled therapy evaluation at this time. Please consult occupational therapy if any therapy needs arise. Thank you.     Naeem Joe

## 2017-04-27 NOTE — PROGRESS NOTES
Pt discussed in rounds. The plan is for this pt to return to [facility] at discharge. CM sent updates to [facility] in [employer]. EKATERINA spoke with pt's son, Martha Cochran ([phone]) to confirm that he wants pt to return to [facility] and he does. EKATERINA also spoke with Jessa Mckeon ([phone]) with [facility] to confirm that she will authorize pt to return to [facility]. She stated that the pt will be able to return there at d/c. She also stated that [facility] will set up the transport. Will follow.  Erik La

## 2017-04-27 NOTE — PROGRESS NOTES
[address] [place] [address]  Patient's son, Baldev Lamar, expressed concern that the patient was less talkative and more drowsy than normal.  RN asked the patient several question / commands (e.g. to identify her son by name, to smile,), and the patient responded appropriately. Baldev Lamar was able to encourage his mother to eat approximately 75% of her lunch which she had previously refused. RN paged Jesus Patrick NP and reported son's concern. Drowsiness probably related to infection; however, the family has decided to not pursue a surgical remedy. Palliative family meeting scheduled for tomorrow. No new orders. [phone] Patient sleeping when RN entered room; however, she opens her eyes and turns towards RN when name is called. She is also able to state her name. Patient returned to sleep quickly.

## 2017-04-27 NOTE — DIABETES MGMT
DTC Progress Note    Recommendations/ Comments: Chart reviewed due to hyperglycemia. Pt has required 17 units of correction insulin over the last 24 hours. Noted patient is on TPN and insulin will be added to the next bag of TPN 8 units per liter (total 14 units). DTC to continue to follow. Chart reviewed on [facility]. Patient is a 80 y.o. female with known diabetes on no diabetes medications at home. A1c:   Lab Results   Component Value Date/Time    Hemoglobin A1c 5.7 04/22/2017 03:36 AM    Hemoglobin A1c 7.4 12/08/2009 05:15 PM       Recent Glucose Results:   Lab Results   Component Value Date/Time     (H) 04/27/2017 06:51 AM    GLUCPOC 285 (H) 04/27/2017 11:47 AM    GLUCPOC 194 (H) 04/27/2017 06:50 AM    GLUCPOC 243 (H) 04/27/2017 01:07 AM        Lab Results   Component Value Date/Time    Creatinine 1.12 04/27/2017 06:51 AM       Active Orders   Diet    DIET DYSPHAGIA PUREED (NDD1)  2 HONEY        PO intake: No data found. Current hospital DM medication: Correction scale Humalog, resistant scale and Regular Insulin in TPN 8 units per liter. Will continue to follow as needed.     Thank you  Inocencia Rios RD, CDE

## 2017-04-27 NOTE — CONSULTS
Palliative Medicine Consult  [place]: [phone] ([phone])    Patient Name: Charles Carey  YOB: 1933    Date of Initial Consult: 4/27/17  Reason for Consult: care decisions  Requesting Provider: Dr. Jennifer Kelley  Primary Care Physician: PROVIDER UNKNOWN      SUMMARY:   Charles Carey is a 80 y. o. with a past history of CVA with residual RUE weakness, DM, CHF, dementia, who was admitted on 4/21/2017 from NH with a diagnosis of change in mental status. Current medical issues leading to Palliative Medicine involvement include: patient with cholecystitis not a surgical candidate due to heart and lung issues, ongoing management of sepsis, need for care decisions. Patient is a long term resident at [facility], goes to day care from NH every other day. PALLIATIVE DIAGNOSES:   1. Dementia  2. Dysphagia  3. High aspiration risk  4. debility       PLAN:   1. Patient appears very tired, does not know where she is. Very thirsty. Gave her some ice chips. She is unable to participate in medical decision making. 2. Spoke with daughter Luis Estrada at length, obtained background history, mom's wishes, etc. Luis Estrada wants to know what her mom's chances are for recovery back to baseline. 3. Plan for family meeting with son and daughter tomorrow at [address] [facility] [facility] [address]   4. Patient may not qualify for Hospice under dementia diagnosis based on her previous baseline. But if family desires Hospice care now, she might be eligible given aspiration risk and untreated cholecystitis. 5. Initial consult note routed to primary continuity provider  6.  Communicated plan of care with: Palliative IDT       GOALS OF CARE / TREATMENT PREFERENCES:   [====Goals of Care====]  GOALS OF CARE:  Patient / health care proxy stated goals: DNR      TREATMENT PREFERENCES:   Code Status: DNR    Advance Care Planning:  Advance Care Planning 4/22/2017   Patient's Healthcare Decision Maker is: Legal Next of Reynold 69   Primary Decision Maker Name Alize Lou Primary Decision Maker Phone Number [phone]   Primary Decision Maker Relationship to Patient Adult child   Confirm Advance Directive Yes, not on file       Other:    The palliative care team has discussed with patient / health care proxy about goals of care / treatment preferences for patient.  [====Goals of Care====]         HISTORY:         HPI/SUBJECTIVE:    The patient is:   [] Verbal and participatory  [x] Non-participatory due to:   Mental status     Clinical Pain Assessment (nonverbal scale for severity on nonverbal patients):   [++++ Clinical Pain Assessment++++]  [++++Pain Severity++++]: Pain: 0  [++++Pain Character++++]:   [++++Pain Duration++++]:   [++++Pain Effect++++]:   [++++Pain Factors++++]:   [++++Pain Frequency++++]:   [++++Pain Location++++]:   [++++ Clinical Pain Assessment++++]     FUNCTIONAL ASSESSMENT:     Palliative Performance Scale (PPS):  PPS: 50       PSYCHOSOCIAL/SPIRITUAL SCREENING:     Advance Care Planning:  Advance Care Planning 4/22/2017   Patient's Healthcare Decision Maker is: Legal Next of Reynold 69   Primary Decision Maker Name Liban Alcala   Primary Decision Maker Phone Number [phone]   Primary Decision Maker Relationship to Patient Adult child   Confirm Advance Directive Yes, not on file        Any spiritual / Cheondoism concerns:  [] Yes /  [] No    Caregiver Burnout:  [] Yes /  [] No /  [] No Caregiver Present      Anticipatory grief assessment:   [] Normal  / [] Maladaptive       ESAS Anxiety:      ESAS Depression:          REVIEW OF SYSTEMS:     Positive and pertinent negative findings in ROS are noted above in HPI. The following systems were [] reviewed / [] unable to be reviewed as noted in HPI  Other findings are noted below. Systems: constitutional, ears/nose/mouth/throat, respiratory, gastrointestinal, genitourinary, musculoskeletal, integumentary, neurologic, psychiatric, endocrine. Positive findings noted below.   Modified ESAS Completed by: provider Fatigue: 5 Drowsiness: 1     Pain: 0           Dyspnea: 2                    PHYSICAL EXAM:     From RN flowsheet:  Wt Readings from Last 3 Encounters:   04/27/17 224 lb 11.2 oz (101.9 kg)   04/22/17 218 lb 4.8 oz (99 kg)   01/28/15 195 lb 5.2 oz (88.6 kg)     Blood pressure 134/69, pulse 81, temperature 98.4 °F (36.9 °C), resp. rate 17, height 5' 3\" (1.6 m), weight 224 lb 11.2 oz (101.9 kg), SpO2 97 %, not currently breastfeeding. Pain Scale 1: Numeric (0 - 10)  Pain Intensity 1: 0              Pain Intervention(s) 1: Medication (see MAR)  Last bowel movement, if known:     Constitutional: alert, oriented to self only, appears weak  Eyes: pupils equal, anicteric  ENMT: no nasal discharge, moist mucous membranes  Cardiovascular: regular rhythm, distal pulses intact  Respiratory: breathing not labored, symmetric  Gastrointestinal: soft non-tender, +bowel sounds  Musculoskeletal: no deformity, no tenderness to palpation  Skin: warm, dry  Neurologic: following commands, does not move right arm  Psychiatric: na  Other:       HISTORY:     Principal Problem:    Acute delirium (4/22/2017)      Past Medical History:   Diagnosis Date    Asthma     [facility] [address]     Heart failure ([facility] [address]     Hypertension     Stroke [facility])       Past Surgical History:   Procedure Laterality Date    HX GYN      hysterectomy      History reviewed. No pertinent family history. History reviewed, no pertinent family history.   Social History   Substance Use Topics    Smoking status: Never Smoker    Smokeless tobacco: Not on file    Alcohol use No     Allergies   Allergen Reactions    Latex Rash    Aspirin Unknown (comments)    Isoniazid Other (comments)    Pcn [Penicillins] Rash    Sulfa (Sulfonamide Antibiotics) Rash      Current Facility-Administered Medications   Medication Dose Route Frequency    TPN ADULT - CENTRAL AA 5% D20% W/ ELECTROLYTES AND CA   IntraVENous CONTINUOUS    TPN ADULT - CENTRAL   IntraVENous CONTINUOUS    fat emulsion 20% (LIPOSYN, INTRALIPID) infusion 500 mL  500 mL IntraVENous EVERY MON & TH    TPN ADULT - CENTRAL AA 5% D20% W/ ELECTROLYTES AND CA   IntraVENous CONTINUOUS    levoFLOXacin (LEVAQUIN) 500 mg in D5W IVPB  500 mg IntraVENous Q24H    amLODIPine (NORVASC) tablet 5 mg  5 mg Oral QHS    hydrALAZINE (APRESOLINE) 20 mg/mL injection 10 mg  10 mg IntraVENous Q6H PRN    insulin lispro (HUMALOG) injection   SubCUTAneous Q6H    sodium chloride (NS) flush 5 mL  5 mL InterCATHeter PRN    sodium chloride (NS) flush 5 mL  5 mL InterCATHeter Q8H    acetaminophen (TYLENOL) tablet 500 mg  500 mg Oral Q8H PRN    citalopram (CELEXA) tablet 20 mg  20 mg Oral DAILY    donepezil (ARICEPT) tablet 5 mg  5 mg Oral QHS    ergocalciferol (ERGOCALCIFEROL) capsule 100,000 Units  100,000 Units Oral EVERY MONTH    gabapentin (NEURONTIN) capsule 300 mg  300 mg Oral QHS    guaiFENesin (ROBITUSSIN) 100 mg/5 mL oral liquid 200 mg  200 mg Oral Q4H PRN    hydrALAZINE (APRESOLINE) tablet 50 mg  50 mg Oral TID    HYDROcodone-acetaminophen (NORCO) 5-325 mg per tablet 1 Tab  1 Tab Oral Q6H PRN    levothyroxine (SYNTHROID) tablet 100 mcg  100 mcg Oral ACB    magnesium oxide (MAG-OX) tablet 400 mg  400 mg Oral DAILY    polyethylene glycol (MIRALAX) packet 17 g  17 g Oral DAILY    simethicone (MYLICON) tablet 597 mg  160 mg Oral Q8H PRN    HYDROmorphone (PF) (DILAUDID) injection 0.5 mg  0.5 mg IntraVENous Q4H PRN    ondansetron (ZOFRAN) injection 4 mg  4 mg IntraVENous Q4H PRN    heparin (porcine) injection 5,000 Units  5,000 Units SubCUTAneous Q8H    albuterol-ipratropium (DUO-NEB) 2.5 MG-0.5 MG/3 ML  3 mL Nebulization Q4H PRN    metroNIDAZOLE (FLAGYL) IVPB premix 500 mg  500 mg IntraVENous Q8H    lactobac ac& pc-s.therm-b.anim (ARACELIS Q/RISAQUAD)  1 Cap Oral DAILY    glucose chewable tablet 16 g  4 Tab Oral PRN    dextrose (D50W) injection syrg 12.5-25 g  12.5-25 g IntraVENous PRN    glucagon (GLUCAGEN) injection 1 mg 1 mg IntraMUSCular PRN    acetaminophen (TYLENOL) suppository 650 mg  650 mg Rectal Q6H PRN          LAB AND IMAGING FINDINGS:     Lab Results   Component Value Date/Time    WBC 18.8 04/27/2017 06:51 AM    HGB 9.1 04/27/2017 06:51 AM    PLATELET 903 60/15/0283 06:51 AM     Lab Results   Component Value Date/Time    Sodium 151 04/27/2017 06:51 AM    Potassium 3.4 04/27/2017 06:51 AM    Chloride 118 04/27/2017 06:51 AM    CO2 28 04/27/2017 06:51 AM    BUN 29 04/27/2017 06:51 AM    Creatinine 1.12 04/27/2017 06:51 AM    Calcium 8.9 04/27/2017 06:51 AM    Magnesium 1.9 04/22/2017 03:36 AM    Phosphorus 3.0 04/22/2017 03:36 AM      Lab Results   Component Value Date/Time    AST (SGOT) 11 04/27/2017 06:51 AM    Alk. phosphatase 85 04/27/2017 06:51 AM    Protein, total 6.6 04/27/2017 06:51 AM    Albumin 2.5 04/27/2017 06:51 AM    Globulin 4.1 04/27/2017 06:51 AM     Lab Results   Component Value Date/Time    INR 1.2 04/24/2017 01:37 AM    Prothrombin time 11.9 04/24/2017 01:37 AM    aPTT 34.5 04/24/2017 01:37 AM      No results found for: IRON, FE, TIBC, IBCT, PSAT, FERR   No results found for: PH, PCO2, PO2  No components found for: [place]   Lab Results   Component Value Date/Time    CK 72 04/22/2017 03:36 AM    CK - MB <1.0 04/22/2017 03:36 AM                Total time:  70m  Counseling / coordination time: 60 m  > 50% counseling / coordination?:     Prolonged service was provided for  []30 min   []75 min in face to face time in the presence of the patient. Time Start:   Time End:   Note: this can only be billed with 62432 (initial) or 96178 (follow up). If multiple start / stop times, list each separately.

## 2017-04-28 VITALS
DIASTOLIC BLOOD PRESSURE: 69 MMHG | WEIGHT: 231.2 LBS | RESPIRATION RATE: 19 BRPM | TEMPERATURE: 99.1 F | HEIGHT: 63 IN | SYSTOLIC BLOOD PRESSURE: 159 MMHG | OXYGEN SATURATION: 98 % | BODY MASS INDEX: 40.96 KG/M2 | HEART RATE: 81 BPM

## 2017-04-28 LAB
ALBUMIN SERPL BCP-MCNC: 2.2 G/DL (ref 3.5–5)
ALBUMIN/GLOB SERPL: 0.6 {RATIO} (ref 1.1–2.2)
ALP SERPL-CCNC: 80 U/L (ref 45–117)
ALT SERPL-CCNC: 12 U/L (ref 12–78)
ANION GAP BLD CALC-SCNC: 8 MMOL/L (ref 5–15)
AST SERPL W P-5'-P-CCNC: 8 U/L (ref 15–37)
BASOPHILS # BLD AUTO: 0 K/UL (ref 0–0.1)
BASOPHILS # BLD: 0 % (ref 0–1)
BILIRUB SERPL-MCNC: 0.3 MG/DL (ref 0.2–1)
BUN SERPL-MCNC: 33 MG/DL (ref 6–20)
BUN/CREAT SERPL: 31 (ref 12–20)
CALCIUM SERPL-MCNC: 8.5 MG/DL (ref 8.5–10.1)
CHLORIDE SERPL-SCNC: 118 MMOL/L (ref 97–108)
CO2 SERPL-SCNC: 24 MMOL/L (ref 21–32)
CREAT SERPL-MCNC: 1.08 MG/DL (ref 0.55–1.02)
DIFFERENTIAL METHOD BLD: ABNORMAL
EOSINOPHIL # BLD: 0.7 K/UL (ref 0–0.4)
EOSINOPHIL NFR BLD: 3 % (ref 0–7)
ERYTHROCYTE [DISTWIDTH] IN BLOOD BY AUTOMATED COUNT: 16.3 % (ref 11.5–14.5)
GLOBULIN SER CALC-MCNC: 3.9 G/DL (ref 2–4)
GLUCOSE BLD STRIP.AUTO-MCNC: 251 MG/DL (ref 65–100)
GLUCOSE BLD STRIP.AUTO-MCNC: 253 MG/DL (ref 65–100)
GLUCOSE BLD STRIP.AUTO-MCNC: 273 MG/DL (ref 65–100)
GLUCOSE SERPL-MCNC: 249 MG/DL (ref 65–100)
HCT VFR BLD AUTO: 27.3 % (ref 35–47)
HGB BLD-MCNC: 8.5 G/DL (ref 11.5–16)
LYMPHOCYTES # BLD AUTO: 11 % (ref 12–49)
LYMPHOCYTES # BLD: 2.6 K/UL (ref 0.8–3.5)
MAGNESIUM SERPL-MCNC: 1.8 MG/DL (ref 1.6–2.4)
MCH RBC QN AUTO: 24.5 PG (ref 26–34)
MCHC RBC AUTO-ENTMCNC: 31.1 G/DL (ref 30–36.5)
MCV RBC AUTO: 78.7 FL (ref 80–99)
METAMYELOCYTES NFR BLD MANUAL: 7 %
MONOCYTES # BLD: 0.9 K/UL (ref 0–1)
MONOCYTES NFR BLD AUTO: 4 % (ref 5–13)
MYELOCYTES NFR BLD MANUAL: 3 %
NEUTS BAND NFR BLD MANUAL: 4 % (ref 0–6)
NEUTS SEG # BLD: 16.8 K/UL (ref 1.8–8)
NEUTS SEG NFR BLD AUTO: 68 % (ref 32–75)
NRBC # BLD: 0.12 K/UL (ref 0–0.01)
NRBC BLD-RTO: 0.5 PER 100 WBC
PHOSPHATE SERPL-MCNC: 1.3 MG/DL (ref 2.6–4.7)
PLATELET # BLD AUTO: 215 K/UL (ref 150–400)
PLATELET COMMENTS,PCOM: ABNORMAL
POTASSIUM SERPL-SCNC: 3.6 MMOL/L (ref 3.5–5.1)
PROT SERPL-MCNC: 6.1 G/DL (ref 6.4–8.2)
RBC # BLD AUTO: 3.47 M/UL (ref 3.8–5.2)
RBC MORPH BLD: ABNORMAL
SERVICE CMNT-IMP: ABNORMAL
SODIUM SERPL-SCNC: 150 MMOL/L (ref 136–145)
WBC # BLD AUTO: 23.4 K/UL (ref 3.6–11)
WBC MORPH BLD: ABNORMAL
WBC NRBC COR # BLD: ABNORMAL 10*3/UL

## 2017-04-28 PROCEDURE — 82962 GLUCOSE BLOOD TEST: CPT

## 2017-04-28 PROCEDURE — 80053 COMPREHEN METABOLIC PANEL: CPT | Performed by: NURSE PRACTITIONER

## 2017-04-28 PROCEDURE — 74011250636 HC RX REV CODE- 250/636: Performed by: INTERNAL MEDICINE

## 2017-04-28 PROCEDURE — 85025 COMPLETE CBC W/AUTO DIFF WBC: CPT

## 2017-04-28 PROCEDURE — 36415 COLL VENOUS BLD VENIPUNCTURE: CPT | Performed by: NURSE PRACTITIONER

## 2017-04-28 PROCEDURE — 84100 ASSAY OF PHOSPHORUS: CPT | Performed by: NURSE PRACTITIONER

## 2017-04-28 PROCEDURE — 74011636637 HC RX REV CODE- 636/637: Performed by: NURSE PRACTITIONER

## 2017-04-28 PROCEDURE — 83735 ASSAY OF MAGNESIUM: CPT | Performed by: NURSE PRACTITIONER

## 2017-04-28 PROCEDURE — 74011000250 HC RX REV CODE- 250: Performed by: INTERNAL MEDICINE

## 2017-04-28 RX ADMIN — HYDRALAZINE HYDROCHLORIDE 10 MG: 20 INJECTION INTRAMUSCULAR; INTRAVENOUS at 06:49

## 2017-04-28 RX ADMIN — Medication 5 ML: at 05:09

## 2017-04-28 RX ADMIN — INSULIN LISPRO 7 UNITS: 100 INJECTION, SOLUTION INTRAVENOUS; SUBCUTANEOUS at 00:59

## 2017-04-28 RX ADMIN — INSULIN LISPRO 7 UNITS: 100 INJECTION, SOLUTION INTRAVENOUS; SUBCUTANEOUS at 12:29

## 2017-04-28 RX ADMIN — INSULIN LISPRO 7 UNITS: 100 INJECTION, SOLUTION INTRAVENOUS; SUBCUTANEOUS at 06:47

## 2017-04-28 RX ADMIN — HEPARIN SODIUM 5000 UNITS: 5000 INJECTION, SOLUTION INTRAVENOUS; SUBCUTANEOUS at 05:04

## 2017-04-28 RX ADMIN — HYDRALAZINE HYDROCHLORIDE 10 MG: 20 INJECTION INTRAMUSCULAR; INTRAVENOUS at 11:40

## 2017-04-28 RX ADMIN — Medication 5 ML: at 12:59

## 2017-04-28 RX ADMIN — METRONIDAZOLE 500 MG: 500 INJECTION, SOLUTION INTRAVENOUS at 11:28

## 2017-04-28 RX ADMIN — HEPARIN SODIUM 5000 UNITS: 5000 INJECTION, SOLUTION INTRAVENOUS; SUBCUTANEOUS at 11:28

## 2017-04-28 RX ADMIN — METRONIDAZOLE 500 MG: 500 INJECTION, SOLUTION INTRAVENOUS at 05:04

## 2017-04-28 NOTE — ROUTINE PROCESS
NUTRITION brief         Chart reviewed for TPN follow-up. Palliative meeting with family today noted with decision to shift to [address] only. TPN discontinued. Will sign off at this time with no further plans for aggressive nutrition intervention. May want to consider adding pleasure feeds if appropriate.      Josue Dennis RD

## 2017-04-28 NOTE — PROGRESS NOTES
Earlier today, EKATERINA met with pt's daughter, Georgette Griffin and DEBBIE , Alireza Fritz. The daughter and DEBBIE chandler participated in a Palliative conference. It was decided that pt would go to a nursing home with comfort care. The daughter would like for pt to go to [facility]. EKATERINA sent a referral to [facility] in [facility]. EKATERINA spoke with Crhisty Mary in admissions and they can accept this pt today. EKATERINA notified NP Magui Mitchell, and she completed the d/c orders and paperwork, which EKATERINA faxed to [facility]. DEBBIE CHANDLER, Alireza Fritz, set up ambulance transport for 6pm with [facility]. Pt's daughter is aware of the d/c and in agreement. An envelope containing pt's kardex, MARs and AVS will be sent with pt to [facility].  Raciel Pedro

## 2017-04-28 NOTE — ROUTINE PROCESS
Bedside and Verbal shift change report given to Maisha Paez (oncoming nurse) by Nicanor Grossman (offgoing nurse). Report included the following information SBAR, Kardex, ED Summary, Procedure Summary, Intake/Output, MAR, Recent Results and Cardiac Rhythm NSR.

## 2017-04-28 NOTE — DISCHARGE SUMMARY
Discharge Summary       PATIENT ID: Martha Yanez  MRN: [id number]   YOB: 1933    DATE OF ADMISSION: 4/21/2017  3:29 PM    DATE OF DISCHARGE: 4/28/2017    PRIMARY CARE PROVIDER: PROVIDER UNKNOWN       DISCHARGING PHYSICIAN: Ivone Hurst NP    To contact this individual call [phone] and ask the  to page. If unavailable ask to be transferred the Adult Hospitalist Department. CONSULTATIONS: IP CONSULT TO HOSPITALIST  IP CONSULT TO GENERAL SURGERY  IP CONSULT TO CARDIOLOGY  IP CONSULT TO INTERVENTIONAL RADIOLOGY  IP CONSULT TO PALLIATIVE CARE - PROVIDER    PROCEDURES/SURGERIES: Procedure(s):  CHOLECYSTECTOMY LAPAROSCOPIC POSSIBLE OPEN     ADMITTING DIAGNOSES & HOSPITAL COURSE:     80year old female with history of prior CVA with residual right-sided weakness, HTN, DM-2, dementia, chronic diastolic heart failure, hypothyroidism presented on 4/22/17 with altered mental status and poorly characterized right upper quadrant abdominal pain. She was noted to have findings concerning for acute cholecystitis on CT Abd/Pelvis which was also confirmed via hida scan. She was offered a surgical removal of gallbladder however family wished for more conservative treatment with fluids and IV antibiotics. Despite five days of IV antibiotics and aggressive medical treatments Ms. Neeraj Whitney continues to not improve medically ( fever, increasing WBC, lethargy and confusion). There was a palliative care meeting today to discuss goals of care and the following was decided :      Note from Palliative care today :     We discussed the option of continuing antibiotics for a few more days vs stopping everything including TPN and focus on comfort alone. She can also return to a [facility] facility and receive comfort care under their care. Family agreeable to this option and want comfort care.     2. Comfort care orders initiated. 3. TPN and antibiotics stopped. 4.  [facility]  will co ordinate discharge with primary team and   5. Initial consult note routed to primary continuity provider  6. Communicated plan of care with: Palliative IDT, Dr. Citlali Back / Snow Ponce:      1. Delirium, acute metabolic encephalopathy  -has hit a plateau at this point , no further improvement seen   - suspect secondary to sepsis in setting of possible acute cholecystitis  - CT head 4/21 - Chronic microvascular ischemic change. No acute intracranial process. - MRI brain 4/22 - No evidence for acute infarction. Mild to moderate bilateral subcortical and periventricular increased T2/FLAIR signal abnormalities are nonspecific but may represent changes of chronic small  vessel ischemic disease.     2. Suspected sepsis secondary to acute cholecystitis  - with tachycardia, leukocytosis upon admission ( white count is rising again)  - CT Abd/Pelvis 4/21 - Findings are suggestive of acute cholecystitis. - Abd U/S 4/21 - Distended gallbladder with gallstones; no biliary ductal dilatation. No right hydronephrosis. - blood cultures 4/21 - gram positive cocci in clusters in 1/2 bottles drawn - coagulase negative Staphylococcus  - on empiric levofloxacin, Flagyl, added vancomycin due to GPCs in clusters as above, however, this is likely contaminant , Vanc dc'd  - NGTD on repeat cultures 4/26  - HIDA on 4/24 with cystic duct obstruction and possible acute cholecystitis  - Attempted to place cholecystostomy tube on 4/24 and unable to do so. Family declines surgery.     3. Possible STACEY  -resolved  - unclear baseline Cr, possibly some underlying CKD, Cr elevated to 1.80 upon admission, suspect prerenal azotemia and sepsis contributing, seemingly improving    4. Prior CVA   - holding home Plavix in setting of possible need for intervention      5.  Chronic diastolic heart failure  - does not currently appear in acute exacerbation of this diagnosis  - unclear baseline NYHA classification, unable to determine at this juncture given her mentation  - on hydralazine     6. Dementia  - continue home Aricept, Celexa     7. DM-2  - sliding scale insulin coverage increased to resistant, stable  - continue home gabapentin     8. HTN  - home amlodipine, hydralazine  -increased Amlodipine dose on 4/26- BP minimally improved on 4/27   - monitor      9. Hypothyroidism  - resume home levothyroxine     10. Wheezing- improved  - predominantly upper airways, suspect some slight reactive airways  - prn DuoNebs     11. Morbid Obesity  - BMI 37.96     12 Hypernatremia  - TPN started per general surgery - stopped today per comfort care measures  - accu checks AC & HS with sliding scale insulin ordered  -monitor        13. Malnutrition in the setting of dysphagia  -stop TPN today t  -??? Dietary put in reccs for dysphagia II pureed diet          PENDING TEST RESULTS:   At the time of discharge the following test results are still pending: none    FOLLOW UP APPOINTMENTS:    Follow-up Information     Follow up With Details Comments Contact Info    Provider Unknown   Patient not available to ask             ADDITIONAL CARE RECOMMENDATIONS: per pace service    DIET: Dysphagia diet-pureed- Dysphagia II and Comfort feedings       OXYGEN / BiPAP SETTINGS: Supplemental oxygen for comfort to keep saturations > 90%    ACTIVITY: Activity as tolerated        DISCHARGE MEDICATIONS:        Current Discharge Medication List         CONTINUE these medications which have CHANGED         Dose & Instructions Dispensing Information Comments    Morning Noon Evening Bedtime     donepezil 5 mg tablet   Commonly known as: ARICEPT   What changed: Another medication with the same name was removed. Continue taking this medication, and follow the directions you see here.       Your last dose was:        Your next dose is:             Dose: 5 mg   Take 5 mg by mouth nightly.     Refills: 0                          gabapentin 300 mg capsule   Commonly known as: NEURONTIN   What changed: Another medication with the same name was removed. Continue taking this medication, and follow the directions you see here.       Your last dose was:        Your next dose is:             Dose: 300 mg   Take 300 mg by mouth nightly. Refills: 0                              CONTINUE these medications which have NOT CHANGED         Dose & Instructions Dispensing Information Comments    Morning Noon Evening Bedtime     acetaminophen 500 mg tablet   Commonly known as: TYLENOL       Your last dose was:        Your next dose is:             Dose: 500 mg   Take 500 mg by mouth every eight (8) hours as needed for Pain. Patient received afternoon dose at [facility]    Refills: 0                          amLODIPine 2.5 mg tablet   Commonly known as: NORVASC       Your last dose was:        Your next dose is:             Dose: 2.5 mg   Take 2.5 mg by mouth nightly. Refills: 0                          citalopram 20 mg tablet   Commonly known as: CELEXA       Your last dose was:        Your next dose is:             Dose: 20 mg   Take 20 mg by mouth daily. Refills: 0                          ergocalciferol 50,000 unit capsule   Commonly known as: ERGOCALCIFEROL       Your last dose was:        Your next dose is:             Dose: 524658 Units   Take 100,000 Units by mouth every month. Refills: 0                          furosemide 20 mg tablet   Commonly known as: LASIX       Your last dose was:        Your next dose is:             Dose: 20 mg   Take 20 mg by mouth every Monday, Wednesday, Friday. Refills: 0                          guaiFENesin 100 mg/5 mL liquid   Commonly known as: ROBITUSSIN       Your last dose was:        Your next dose is:             Dose: 200 mg   Take 200 mg by mouth every four (4) hours as needed for Cough.     Refills: 0                          hydrALAZINE 100 mg tablet   Commonly known as: APRESOLINE       Your last dose was:        Your next dose is:          Dose: 50 mg   Take 50 mg by mouth three (3) times daily. Refills: 0                          levothyroxine 100 mcg tablet   Commonly known as: SYNTHROID       Your last dose was:        Your next dose is:             Dose: 100 mcg   Take 100 mcg by mouth Daily (before breakfast). Refills: 0                          lisinopril 40 mg tablet   Commonly known as: PRINIVIL, ZESTRIL       Your last dose was:        Your next dose is:             Dose: 40 mg   Take 40 mg by mouth daily. Refills: 0                          magnesium oxide 400 mg tablet   Commonly known as: MAG-OX       Your last dose was:        Your next dose is:             Dose: 400 mg   Take 400 mg by mouth daily. Refills: 0                          MIRALAX 17 gram packet   Generic drug: polyethylene glycol       Your last dose was:        Your next dose is:             Dose: 17 g   Take 17 g by mouth daily. Mix in 8 oz of water, juice, soda, coffee, or tea prior to administration    Refills: 0                          PLAVIX 75 mg Tab   Generic drug: clopidogrel       Your last dose was:        Your next dose is:             Dose: 75 mg   Take 75 mg by mouth daily. Refills: 0                          polyvinyl alcohol 1.4 % ophthalmic solution   Commonly known as: LIQUIFILM TEARS       Your last dose was:        Your next dose is:             Dose: 2 Drop   Administer 2 Drops to both eyes ACB/HS. Refills: 0                          SARNA ANTI-ITCH 0.5-0.5 % lotion   Generic drug: camphor-menthol       Your last dose was:        Your next dose is:             Apply to affected area four (4) times daily as needed (neuropathich pain). Refills: 0                          simethicone 80 mg chewable tablet   Commonly known as: MYLICON       Your last dose was:        Your next dose is:             Dose: 160 mg   Take 160 mg by mouth every eight (8) hours as needed for Flatulence.     Refills: 0                          VOLTAREN 1 % Gel Generic drug: diclofenac       Your last dose was:        Your next dose is:             Dose: 2 g   Apply 2 g to affected area four (4) times daily as needed (Knee and shoulder pain). Refills: 0                          ZOFRAN (AS HYDROCHLORIDE) 4 mg tablet   Generic drug: ondansetron hcl       Your last dose was:        Your next dose is:             Dose: 4 mg   Take 4 mg by mouth every eight (8) hours as needed for Nausea. Refills: 0                              STOP taking these medications      DECARA 50,000 unit Cap   Generic drug: Cholecalciferol (Vitamin D3)            HYDROcodone-acetaminophen 5-325 mg per tablet   Commonly known as: NORCO            VITAMIN D3 2,000 unit Tab   Generic drug: cholecalciferol (vitamin D3)                  NOTIFY YOUR PHYSICIAN FOR ANY OF THE FOLLOWING:   Fever over 101 degrees for 24 hours. Chest pain, shortness of breath, fever, chills, nausea, vomiting, diarrhea, change in mentation, falling, weakness, bleeding. Severe pain or pain not relieved by medications. Or, any other signs or symptoms that you may have questions about.     DISPOSITION:    Home With:   OT  PT  HH  RN      X SNF/Inpatient Rehab/LTAC    Independent/assisted living    Hospice    Other:       PATIENT CONDITION AT DISCHARGE:     Functional status   X Poor     Deconditioned     Independent      Cognition     Lucid     Forgetful    X Dementia      Catheters/lines (plus indication)    Lua     PICC     PEG    X None      Code status     Full code    X DNR          CHRONIC MEDICAL DIAGNOSES:  Problem List as of 4/28/2017  Date Reviewed: 4/28/2017          Codes Class Noted - Resolved    Shortness of breath ICD-10-CM: R06.02  ICD-9-CM: 786.05  4/27/2017 - Present        Delirium ICD-10-CM: R41.0  ICD-9-CM: 780.09  4/27/2017 - Present        Debility ICD-10-CM: R53.81  ICD-9-CM: 799.3  4/27/2017 - Present        Goals of care, counseling/discussion ICD-10-CM: Z71.89  ICD-9-CM: V65.49  4/27/2017 - Present        * (Principal)Acute delirium ICD-10-CM: R41.0  ICD-9-CM: 780.09  4/22/2017 - Present                CDMP Checked:    Yes X         Signed:   Melisa Tony NP  4/28/2017  3:37 PM

## 2017-04-28 NOTE — PROGRESS NOTES
Cm met with providers, (hospitalist, neuro, nephrology) to discuss d/c. All are in agreement that this pt will be likely ready for discharge tomorrow. CM called [facility] and left them a message infoming them. EKATERINA met with pt and son to inform them as well.  Annamarie Major

## 2017-04-28 NOTE — PROGRESS NOTES
Received return call from [facility] staff that [facility] Family, who has been present since 1800, informed of same.

## 2017-04-28 NOTE — PROGRESS NOTES
04/27/17 1841   Vitals   Temp 99.2 °F (37.3 °C)   Temp Source Axillary   Pulse (Heart Rate) 84   Heart Rate Source Monitor   Resp Rate 24   O2 Sat (%) 98 %   Level of Consciousness Responds to Voice   /67   MAP (Calculated) 97   BP 1 Location Left arm   BP 1 Method Automatic   BP Patient Position At rest   Cardiac Rhythm NSR   MEWS Score 3   Box Number 663   Electrodes Replaced No   Pain 1   Pain Scale 1 Adult Nonverbal Pain Scale   Pain Intensity 1 0   Adult Nonverbal Pain Scale   Face 0   Activity (Movement) 0   Guarding 0   Physiology (Vital Signs) 0   Respiratory 0   Total Score 0   Oxygen Therapy   O2 Device Room air   Patient Observation   Hourly Rounds Yes  (q1h rounds performed)   8040 Paged Lawson Valencia NP.    2000 No response from NP. Wilma Haines RN informed of MEWS score and absence of return call during shift report.

## 2017-04-28 NOTE — DIABETES MGMT
DTC Progress Note    Recommendations/ Comments: Chart reviewed due to hyperglycemia. Pt has required 35 units of correction insulin over the last 24 hours. Noted patient is no longer  on TPN. May benefit from a basal insulin, if glucose continues to remain > 180 mg/dL, such as Lantus 15* units. *weight based dosage calculation (0.1-0.2 units/ kg/ day) based on AACE T2D Algorithm, Executive Summary, Endocr Pract. 2016; 22(No. 1)      Patient is a 80 y.o. female with known diabetes on no diabetes medications at home. A1c:   Lab Results   Component Value Date/Time    Hemoglobin A1c 5.7 04/22/2017 03:36 AM    Hemoglobin A1c 7.4 12/08/2009 05:15 PM       Recent Glucose Results:   Lab Results   Component Value Date/Time     (H) 04/28/2017 05:17 AM    GLUCPOC 253 (H) 04/28/2017 11:57 AM    GLUCPOC 273 (H) 04/28/2017 06:34 AM    GLUCPOC 251 (H) 04/28/2017 12:38 AM        Lab Results   Component Value Date/Time    Creatinine 1.08 04/28/2017 05:17 AM       Active Orders   Diet    DIET NPO        PO intake: No data found. Current hospital DM medication: Correction scale Humalog, resistant scale    Will continue to follow as needed. Thank you  Emeli Santizo.  JENNIFER Ahuja, [address] [place] [address]   [phone] (office)  [phone] (pager)

## 2017-04-28 NOTE — PROGRESS NOTES
Call placed to [facility] since transport has not arrived. Requested return call with WENDY.

## 2017-04-28 NOTE — CONSULTS
Palliative Medicine Consult  Rico: [phone] ([phone]    Patient Name: Martha Yanez  YOB: 1933    Date of Initial Consult: 4/27/17  Reason for Consult: care decisions  Requesting Provider: Dr. Neo Adams  Primary Care Physician: PROVIDER UNKNOWN      SUMMARY:   Martha Yanez is a 80 y. o. with a past history of CVA with residual RUE weakness, DM, CHF, dementia, who was admitted on 4/21/2017 from NH with a diagnosis of change in mental status. Current medical issues leading to Palliative Medicine involvement include: patient with cholecystitis not a surgical candidate due to heart and lung issues, ongoing management of sepsis, need for care decisions. Patient is a long term resident at [facility], goes to day care from NH every other day. She required assistance with all ADLs including feeding. 4/28- more lethargic today, fever continues   PALLIATIVE DIAGNOSES:   1. Dementia- F  2. Dysphagia  3. High aspiration risk  4. debility       PLAN:   1. Family meeting held with daughter Reagan Elizalde, son Mamta Muñoz, chaplain Tripp and SW from [facility]. - Provided medical updates, patient continuing to remain weak, fevers despite 5 days of IV antibiotics, delirium, etc. Discussed the high risk of mortality with cholecystitis in the setting advanced dementia. [facility]  shared patient's wishes, her completion of POST form on a particularly good day. Son and daughter both share that mom has been through enough, would not want to be poked and prodded, ready to join her maker given no quality of life, etc.    We discussed the option of continuing antibiotics for a few more days vs stopping everything including TPN and focus on comfort alone. She can also return to a [facility] facility and receive comfort care under their care. Family agreeable to this option and want comfort care. 2. Comfort care orders initiated. 3. TPN and antibiotics stopped. 4.  [facility]  will co ordinate discharge with primary team and   5. Initial consult note routed to primary continuity provider  6.  Communicated plan of care with: Palliative IDT, Dr. Linda Valencia / TREATMENT PREFERENCES:   [====Goals of Care====]  GOALS OF CARE:  Patient / health care proxy stated goals: DNR      TREATMENT PREFERENCES:   Code Status: DNR    Advance Care Planning:  Advance Care Planning 4/22/2017   Patient's Healthcare Decision Maker is: Legal Next Keyon Hansen   Primary Decision Maker Name Vika Willson   Primary Decision Maker Phone Number [phone]   Primary Decision Maker Relationship to Patient Adult child   Confirm Advance Directive Yes, not on file       Other:    The palliative care team has discussed with patient / health care proxy about goals of care / treatment preferences for patient.  [====Goals of Care====]         HISTORY:         HPI/SUBJECTIVE:    The patient is:   [] Verbal and participatory  [x] Non-participatory due to:   Mental status     Clinical Pain Assessment (nonverbal scale for severity on nonverbal patients):   [++++ Clinical Pain Assessment++++]  [++++Pain Severity++++]: Pain: 0  [++++Pain Character++++]:   [++++Pain Duration++++]:   [++++Pain Effect++++]:   [++++Pain Factors++++]:   [++++Pain Frequency++++]:   [++++Pain Location++++]:   [++++ Clinical Pain Assessment++++]     FUNCTIONAL ASSESSMENT:     Palliative Performance Scale (PPS):  PPS: 50       PSYCHOSOCIAL/SPIRITUAL SCREENING:     Advance Care Planning:  Advance Care Planning 4/22/2017   Patient's Healthcare Decision Maker is: Legal Next of Reynold Hansen   Primary Decision Maker Name Vika Willson   Primary Decision Maker Phone Number [phone]   Primary Decision Maker Relationship to Patient Adult child   Confirm Advance Directive Yes, not on file        Any spiritual / Evangelical concerns:  [] Yes /  [] No    Caregiver Burnout:  [] Yes /  [] No /  [] No Caregiver Present      Anticipatory grief assessment:   [] Normal  / [] Maladaptive       ESAS Anxiety:      ESAS Depression:          REVIEW OF SYSTEMS:     Positive and pertinent negative findings in ROS are noted above in HPI. The following systems were [] reviewed / [] unable to be reviewed as noted in HPI  Other findings are noted below. Systems: constitutional, ears/nose/mouth/throat, respiratory, gastrointestinal, genitourinary, musculoskeletal, integumentary, neurologic, psychiatric, endocrine. Positive findings noted below. Modified ESAS Completed by: provider   Fatigue: 5 Drowsiness: 1     Pain: 0           Dyspnea: 2                    PHYSICAL EXAM:     From RN flowsheet:  Wt Readings from Last 3 Encounters:   04/28/17 231 lb 3.2 oz (104.9 kg)   04/22/17 218 lb 4.8 oz (99 kg)   01/28/15 195 lb 5.2 oz (88.6 kg)     Blood pressure 174/63, pulse 82, temperature 99.8 °F (37.7 °C), resp. rate 22, height 5' 3\" (1.6 m), weight 231 lb 3.2 oz (104.9 kg), SpO2 98 %, not currently breastfeeding.     Pain Scale 1: Adult Nonverbal Pain Scale  Pain Intensity 1: 1              Pain Intervention(s) 1: Medication (see MAR)  Last bowel movement, if known:     Constitutional: alert, oriented to self only, appears weak  Eyes: pupils equal, anicteric  ENMT: no nasal discharge, moist mucous membranes  Cardiovascular: regular rhythm, distal pulses intact  Respiratory: breathing not labored, symmetric  Gastrointestinal: soft non-tender, +bowel sounds  Musculoskeletal: no deformity, no tenderness to palpation  Skin: warm, dry  Neurologic: following commands, does not move right arm  Psychiatric: na  Other:       HISTORY:     Principal Problem:    Acute delirium (4/22/2017)    Active Problems:    Shortness of breath (4/27/2017)      Delirium (4/27/2017)      Debility (4/27/2017)      Goals of care, counseling/discussion (4/27/2017)      Past Medical History:   Diagnosis Date    Asthma     [facility]     [facility] failure [facility]     Hypertension     Stroke [facility])       Past Surgical History: Procedure Laterality Date    HX GYN      hysterectomy      History reviewed. No pertinent family history. History reviewed, no pertinent family history.   Social History   Substance Use Topics    Smoking status: Never Smoker    Smokeless tobacco: Not on file    Alcohol use No     Allergies   Allergen Reactions    Latex Rash    Aspirin Unknown (comments)    Isoniazid Other (comments)    Pcn [Penicillins] Rash    Sulfa (Sulfonamide Antibiotics) Rash      Current Facility-Administered Medications   Medication Dose Route Frequency    fat emulsion 20% (LIPOSYN, INTRALIPID) infusion 500 mL  500 mL IntraVENous EVERY MON & TH    TPN ADULT - CENTRAL   IntraVENous CONTINUOUS    levoFLOXacin (LEVAQUIN) 500 mg in D5W IVPB  500 mg IntraVENous Q24H    amLODIPine (NORVASC) tablet 5 mg  5 mg Oral QHS    hydrALAZINE (APRESOLINE) 20 mg/mL injection 10 mg  10 mg IntraVENous Q6H PRN    insulin lispro (HUMALOG) injection   SubCUTAneous Q6H    sodium chloride (NS) flush 5 mL  5 mL InterCATHeter PRN    sodium chloride (NS) flush 5 mL  5 mL InterCATHeter Q8H    acetaminophen (TYLENOL) tablet 500 mg  500 mg Oral Q8H PRN    citalopram (CELEXA) tablet 20 mg  20 mg Oral DAILY    donepezil (ARICEPT) tablet 5 mg  5 mg Oral QHS    ergocalciferol (ERGOCALCIFEROL) capsule 100,000 Units  100,000 Units Oral EVERY MONTH    gabapentin (NEURONTIN) capsule 300 mg  300 mg Oral QHS    guaiFENesin (ROBITUSSIN) 100 mg/5 mL oral liquid 200 mg  200 mg Oral Q4H PRN    hydrALAZINE (APRESOLINE) tablet 50 mg  50 mg Oral TID    HYDROcodone-acetaminophen (NORCO) 5-325 mg per tablet 1 Tab  1 Tab Oral Q6H PRN    levothyroxine (SYNTHROID) tablet 100 mcg  100 mcg Oral ACB    magnesium oxide (MAG-OX) tablet 400 mg  400 mg Oral DAILY    polyethylene glycol (MIRALAX) packet 17 g  17 g Oral DAILY    simethicone (MYLICON) tablet 529 mg  160 mg Oral Q8H PRN    HYDROmorphone (PF) (DILAUDID) injection 0.5 mg  0.5 mg IntraVENous Q4H PRN    ondansetron (ZOFRAN) injection 4 mg  4 mg IntraVENous Q4H PRN    heparin (porcine) injection 5,000 Units  5,000 Units SubCUTAneous Q8H    albuterol-ipratropium (DUO-NEB) 2.5 MG-0.5 MG/3 ML  3 mL Nebulization Q4H PRN    metroNIDAZOLE (FLAGYL) IVPB premix 500 mg  500 mg IntraVENous Q8H    lactobac ac& pc-s.therm-b.anim (ARACELIS Q/RISAQUAD)  1 Cap Oral DAILY    glucose chewable tablet 16 g  4 Tab Oral PRN    dextrose (D50W) injection syrg 12.5-25 g  12.5-25 g IntraVENous PRN    glucagon (GLUCAGEN) injection 1 mg  1 mg IntraMUSCular PRN    acetaminophen (TYLENOL) suppository 650 mg  650 mg Rectal Q6H PRN          LAB AND IMAGING FINDINGS:     Lab Results   Component Value Date/Time    WBC 23.4 04/28/2017 05:17 AM    HGB 8.5 04/28/2017 05:17 AM    PLATELET 290 95/15/8019 05:17 AM     Lab Results   Component Value Date/Time    Sodium 150 04/28/2017 05:17 AM    Potassium 3.6 04/28/2017 05:17 AM    Chloride 118 04/28/2017 05:17 AM    CO2 24 04/28/2017 05:17 AM    BUN 33 04/28/2017 05:17 AM    Creatinine 1.08 04/28/2017 05:17 AM    Calcium 8.5 04/28/2017 05:17 AM    Magnesium 1.8 04/28/2017 05:17 AM    Phosphorus 1.3 04/28/2017 05:17 AM      Lab Results   Component Value Date/Time    AST (SGOT) 8 04/28/2017 05:17 AM    Alk.  phosphatase 80 04/28/2017 05:17 AM    Protein, total 6.1 04/28/2017 05:17 AM    Albumin 2.2 04/28/2017 05:17 AM    Globulin 3.9 04/28/2017 05:17 AM     Lab Results   Component Value Date/Time    INR 1.2 04/24/2017 01:37 AM    Prothrombin time 11.9 04/24/2017 01:37 AM    aPTT 34.5 04/24/2017 01:37 AM      No results found for: IRON, FE, TIBC, IBCT, PSAT, FERR   No results found for: PH, PCO2, PO2  No components found for: [place]   Lab Results   Component Value Date/Time    CK 72 04/22/2017 03:36 AM    CK - MB <1.0 04/22/2017 03:36 AM                Total time:  70m  Counseling / coordination time: 60 m  > 50% counseling / coordination?:     Prolonged service was provided for  []30 min   []75 min in face to face time in the presence of the patient. Time Start:   Time End:   Note: this can only be billed with 81535 (initial) or 35061 (follow up). If multiple start / stop times, list each separately.

## 2017-04-28 NOTE — PROGRESS NOTES
Spiritual Care Assessment/Progress Notes    Jose Antonio Sanabria [id number]  [id number]    3/8/1933  80 y.o.  female    Patient Telephone Number: [phone] (home)   Orthodoxy Affiliation: [place]   Language: English   Extended Emergency Contact Information  Primary Emergency Contact: Ashly Phone: [phone]  Relation: Child  Secondary Emergency Contact: Bernarda Laws Phone: [phone]  Relation: Child   Patient Active Problem List    Diagnosis Date Noted    Shortness of breath 04/27/2017    Delirium 04/27/2017    Debility 04/27/2017    Goals of care, counseling/discussion 04/27/2017    Acute delirium 04/22/2017        Date: 4/28/2017       Level of Orthodoxy/Spiritual Activity:  [x]         Involved in tabby tradition/spiritual practice    []         Not involved in tabby tradition/spiritual practice  []         Spiritually oriented    []         Claims no spiritual orientation    []         seeking spiritual identity  []         Feels alienated from Pentecostal practice/tradition  []         Feels angry about Pentecostal practice/tradition  [x]         Spirituality/Pentecostal tradition is a resource for coping at this time.   []         Not able to assess due to medical condition    Services Provided Today:  []         crisis intervention    []         reading Scriptures  [x]         spiritual assessment    [x]         prayer  [x]         empathic listening/emotional support  []         rites and rituals (cite in comments)  []         life review     []         Pentecostal support  []         theological development   []         advocacy  []         ethical dialog     []         blessing  []         bereavement support    [x]         support to family  [x]         anticipatory grief support   []         help with AMD  []         spiritual guidance    []         meditation      Spiritual Care Needs  []         Emotional Support  [x]         Spiritual/Taoist Care  []         Loss/Adjustment  []         Advocacy/Referral                /Ethics  []         No needs expressed at               this time  [x]         Other: (note in               comments)  Spiritual Care Plan  []         Follow up visits with               pt/family  []         Provide materials  []         Schedule sacraments  []         Contact Community               Clergy  [x]         Follow up as needed  []         Other: (note in               comments)     Comments: Visited with pt and family until Dr. Bakari Wheeler arrived for family meeting. Joined in family meeting as pt's health concerns and plan of care were discussed. Pt's son, daughter, and  from the [employer] participated in the meeting. Offered emotional support as family made decision to transition pt's care to comfort. Pt's family shared of the significance of pt's tabby. Returned to pt's room and offered prayer at pt's bedside. Following prayer, pt shared that she talks to God all of the time. Pt and family will continue to have the support of the [facility] program, including  support for spiritual needs, when pt is discharged. Assured family of the ongoing availability of chaplains here in the hospital prior to pt's discharge.     Josee Obando, Palliative

## 2017-04-28 NOTE — PROGRESS NOTES
Reviewed chart and discussed case with nsg and CRM. Note plans for transition to hospice services. Per discussion with nsg, patient did not tolerate diet last night with coughing with applesauce. Given patient is on the most restrictive diet (puree/honey thick liquids), there are no further diet modifications to increase safety. Agree with hospice care and would recommend allowing patient desired PO items for comfort with known risks of aspiration upon transition to hospice. Nothing further for SLP to offer at this time. Please re-consult if further needs arise. Thanks. Nandini Mcneil M.CD.  CCC-SLP

## 2017-04-28 NOTE — PROGRESS NOTES
Pt discharged at this time to [facility]. Left unit per jocelin accompanied by EMS. Daughter is meeting them at the NH with pt's belongings. Daughter offers hugs and verbalizes appreciation for staff's care.

## 2017-04-28 NOTE — INTERDISCIPLINARY ROUNDS
IDR/SLIDR Summary        Patient: Brenda Bradshaw MRN: [id number] Age: 80 y.o. YOB: 1933 Room/Bed: [facility]   Admit Diagnosis: Acute delirium  Principal Diagnosis: Acute delirium   Goals: Safety, Palliative meeting for comfort care, DC planning  Readmission: NO  Quality Measure: SEPSIS  VTE Prophylaxis: Chemical  Influenza Vaccine screening completed? YES  Pneumococcal Vaccine screening completed? No  Mobility needs: Yes   Nutrition plan:Yes  Consults:P.T, O.T., Speech, Respiratory and Case Management    Financial concerns:No  Escalated to CM? YES  RRAT Score: 12   Interventions:  Testing due for pt today?  No  LOS: 2 days Expected length of stay >2 days  Discharge plan: TBD   PCP: PROVIDER UNKNOWN  Transportation needs: Yes    Days before discharge:Ready for DC once palliative care options are chosen   Discharge disposition: [address] [place] [address] for LTC/ end of life comfort care    Signed:     Courtney Suggs RN  4/28/2017  5:22 AM

## 2017-04-28 NOTE — PROGRESS NOTES
[facility]    Discussed patient with Hospitalist team.  Plan is for discharge today with comfort measures at family's request.  They do not wish to pursue surgery. We will sign off at this time. Please call back with further questions.      Birgit Dunham MD  4/28/2017  12:23 PM

## 2017-04-28 NOTE — PROGRESS NOTES
DISCHARGE REPORT:    Verbal report per telephone given to nurse Carmen Pompa on Olga Florian  being discharged to [facility] for routine progression of care       Report consisted of patients Situation, Background, Assessment and   Recommendations(SBAR). Information from the following report(s) SBAR, ED Summary, Intake/Output, MAR, Recent Results and Cardiac Rhythm SR with PVCs was reviewed with the receiving nurse. Lines: [facility] dc'd @ 0961. Pressure dressing in place. Opportunity for questions and clarification was provided. Carmen Pompa confirmed receipt of faxed information send per care management. Patient to be transported per EMS with EMTs scheduled for 1800.

## 2017-04-29 LAB
BACTERIA SPEC CULT: NORMAL
SERVICE CMNT-IMP: NORMAL

## 2019-09-28 ENCOUNTER — APPOINTMENT (OUTPATIENT)
Dept: GENERAL RADIOLOGY | Age: 84
DRG: 243 | End: 2019-09-28
Attending: EMERGENCY MEDICINE
Payer: MEDICARE

## 2019-09-28 ENCOUNTER — HOSPITAL ENCOUNTER (INPATIENT)
Age: 84
LOS: 4 days | Discharge: NURSING, REHAB OR LONG-TERM CARE | DRG: 243 | End: 2019-10-02
Attending: EMERGENCY MEDICINE | Admitting: INTERNAL MEDICINE
Payer: MEDICARE

## 2019-09-28 DIAGNOSIS — R55 SYNCOPE AND COLLAPSE: ICD-10-CM

## 2019-09-28 DIAGNOSIS — R00.1 JUNCTIONAL (NODAL) BRADYCARDIA: ICD-10-CM

## 2019-09-28 DIAGNOSIS — R00.1 JUNCTIONAL BRADYCARDIA: Primary | ICD-10-CM

## 2019-09-28 LAB
ALBUMIN SERPL-MCNC: 3.2 G/DL (ref 3.5–5)
ALBUMIN/GLOB SERPL: 0.8 {RATIO} (ref 1.1–2.2)
ALP SERPL-CCNC: 108 U/L (ref 45–117)
ALT SERPL-CCNC: 12 U/L (ref 12–78)
ANION GAP SERPL CALC-SCNC: 6 MMOL/L (ref 5–15)
APPEARANCE UR: CLEAR
AST SERPL-CCNC: 12 U/L (ref 15–37)
ATRIAL RATE: 40 BPM
BACTERIA URNS QL MICRO: NEGATIVE /HPF
BASOPHILS # BLD: 0 K/UL (ref 0–0.1)
BASOPHILS NFR BLD: 0 % (ref 0–1)
BILIRUB SERPL-MCNC: 0.2 MG/DL (ref 0.2–1)
BILIRUB UR QL: NEGATIVE
BUN SERPL-MCNC: 11 MG/DL (ref 6–20)
BUN/CREAT SERPL: 15 (ref 12–20)
CALCIUM SERPL-MCNC: 8.9 MG/DL (ref 8.5–10.1)
CALCULATED R AXIS, ECG10: 1 DEGREES
CALCULATED T AXIS, ECG11: 106 DEGREES
CHLORIDE SERPL-SCNC: 108 MMOL/L (ref 97–108)
CO2 SERPL-SCNC: 27 MMOL/L (ref 21–32)
COLOR UR: ABNORMAL
COMMENT, HOLDF: NORMAL
CREAT SERPL-MCNC: 0.71 MG/DL (ref 0.55–1.02)
DIAGNOSIS, 93000: NORMAL
DIFFERENTIAL METHOD BLD: ABNORMAL
EOSINOPHIL # BLD: 0.1 K/UL (ref 0–0.4)
EOSINOPHIL NFR BLD: 2 % (ref 0–7)
EPITH CASTS URNS QL MICRO: ABNORMAL /LPF
ERYTHROCYTE [DISTWIDTH] IN BLOOD BY AUTOMATED COUNT: 15.1 % (ref 11.5–14.5)
GLOBULIN SER CALC-MCNC: 4.2 G/DL (ref 2–4)
GLUCOSE BLD STRIP.AUTO-MCNC: 97 MG/DL (ref 65–100)
GLUCOSE SERPL-MCNC: 100 MG/DL (ref 65–100)
GLUCOSE UR STRIP.AUTO-MCNC: NEGATIVE MG/DL
HCT VFR BLD AUTO: 36.5 % (ref 35–47)
HGB BLD-MCNC: 11.3 G/DL (ref 11.5–16)
HGB UR QL STRIP: NEGATIVE
HYALINE CASTS URNS QL MICRO: ABNORMAL /LPF (ref 0–5)
IMM GRANULOCYTES # BLD AUTO: 0 K/UL (ref 0–0.04)
IMM GRANULOCYTES NFR BLD AUTO: 1 % (ref 0–0.5)
INR PPP: 1 (ref 0.9–1.1)
KETONES UR QL STRIP.AUTO: NEGATIVE MG/DL
LEUKOCYTE ESTERASE UR QL STRIP.AUTO: NEGATIVE
LYMPHOCYTES # BLD: 3.1 K/UL (ref 0.8–3.5)
LYMPHOCYTES NFR BLD: 35 % (ref 12–49)
MAGNESIUM SERPL-MCNC: 1.8 MG/DL (ref 1.6–2.4)
MCH RBC QN AUTO: 26.2 PG (ref 26–34)
MCHC RBC AUTO-ENTMCNC: 31 G/DL (ref 30–36.5)
MCV RBC AUTO: 84.5 FL (ref 80–99)
MONOCYTES # BLD: 0.4 K/UL (ref 0–1)
MONOCYTES NFR BLD: 4 % (ref 5–13)
NEUTS SEG # BLD: 5.2 K/UL (ref 1.8–8)
NEUTS SEG NFR BLD: 58 % (ref 32–75)
NITRITE UR QL STRIP.AUTO: NEGATIVE
NRBC # BLD: 0 K/UL (ref 0–0.01)
NRBC BLD-RTO: 0 PER 100 WBC
PH UR STRIP: 6.5 [PH] (ref 5–8)
PLATELET # BLD AUTO: 311 K/UL (ref 150–400)
PMV BLD AUTO: 9.4 FL (ref 8.9–12.9)
POTASSIUM SERPL-SCNC: 4 MMOL/L (ref 3.5–5.1)
PROT SERPL-MCNC: 7.4 G/DL (ref 6.4–8.2)
PROT UR STRIP-MCNC: 30 MG/DL
PROTHROMBIN TIME: 10.4 SEC (ref 9–11.1)
Q-T INTERVAL, ECG07: 496 MS
QRS DURATION, ECG06: 78 MS
QTC CALCULATION (BEZET), ECG08: 404 MS
RBC # BLD AUTO: 4.32 M/UL (ref 3.8–5.2)
RBC #/AREA URNS HPF: ABNORMAL /HPF (ref 0–5)
SAMPLES BEING HELD,HOLD: NORMAL
SERVICE CMNT-IMP: NORMAL
SODIUM SERPL-SCNC: 141 MMOL/L (ref 136–145)
SP GR UR REFRACTOMETRY: 1.01 (ref 1–1.03)
TROPONIN I SERPL-MCNC: <0.05 NG/ML
UR CULT HOLD, URHOLD: NORMAL
UROBILINOGEN UR QL STRIP.AUTO: 0.2 EU/DL (ref 0.2–1)
VENTRICULAR RATE, ECG03: 40 BPM
WBC # BLD AUTO: 8.9 K/UL (ref 3.6–11)
WBC URNS QL MICRO: ABNORMAL /HPF (ref 0–4)

## 2019-09-28 PROCEDURE — 36415 COLL VENOUS BLD VENIPUNCTURE: CPT

## 2019-09-28 PROCEDURE — 93005 ELECTROCARDIOGRAM TRACING: CPT

## 2019-09-28 PROCEDURE — 81001 URINALYSIS AUTO W/SCOPE: CPT

## 2019-09-28 PROCEDURE — 85610 PROTHROMBIN TIME: CPT

## 2019-09-28 PROCEDURE — 77030011943

## 2019-09-28 PROCEDURE — 84484 ASSAY OF TROPONIN QUANT: CPT

## 2019-09-28 PROCEDURE — 85025 COMPLETE CBC W/AUTO DIFF WBC: CPT

## 2019-09-28 PROCEDURE — 65660000000 HC RM CCU STEPDOWN

## 2019-09-28 PROCEDURE — 99285 EMERGENCY DEPT VISIT HI MDM: CPT

## 2019-09-28 PROCEDURE — 71045 X-RAY EXAM CHEST 1 VIEW: CPT

## 2019-09-28 PROCEDURE — 80053 COMPREHEN METABOLIC PANEL: CPT

## 2019-09-28 PROCEDURE — 74011250637 HC RX REV CODE- 250/637: Performed by: INTERNAL MEDICINE

## 2019-09-28 PROCEDURE — 83735 ASSAY OF MAGNESIUM: CPT

## 2019-09-28 PROCEDURE — 82962 GLUCOSE BLOOD TEST: CPT

## 2019-09-28 RX ORDER — CITALOPRAM 20 MG/1
20 TABLET, FILM COATED ORAL DAILY
Status: CANCELLED | OUTPATIENT
Start: 2019-09-29

## 2019-09-28 RX ORDER — GABAPENTIN 300 MG/1
300 CAPSULE ORAL
Status: DISCONTINUED | OUTPATIENT
Start: 2019-09-28 | End: 2019-10-02 | Stop reason: HOSPADM

## 2019-09-28 RX ORDER — POLYETHYLENE GLYCOL 3350 17 G/17G
17 POWDER, FOR SOLUTION ORAL DAILY
Status: DISCONTINUED | OUTPATIENT
Start: 2019-09-29 | End: 2019-10-02 | Stop reason: HOSPADM

## 2019-09-28 RX ORDER — SODIUM CHLORIDE 0.9 % (FLUSH) 0.9 %
5-40 SYRINGE (ML) INJECTION AS NEEDED
Status: DISCONTINUED | OUTPATIENT
Start: 2019-09-28 | End: 2019-10-02 | Stop reason: HOSPADM

## 2019-09-28 RX ORDER — ALBUTEROL SULFATE 1.25 MG/3ML
1.25 SOLUTION RESPIRATORY (INHALATION)
COMMUNITY

## 2019-09-28 RX ORDER — LEVETIRACETAM 500 MG/1
500 TABLET, EXTENDED RELEASE ORAL DAILY
COMMUNITY
End: 2019-10-02

## 2019-09-28 RX ORDER — LORAZEPAM 2 MG/ML
1 INJECTION INTRAMUSCULAR
COMMUNITY

## 2019-09-28 RX ORDER — LEVOTHYROXINE SODIUM 100 UG/1
100 TABLET ORAL
Status: DISCONTINUED | OUTPATIENT
Start: 2019-09-29 | End: 2019-10-02 | Stop reason: HOSPADM

## 2019-09-28 RX ORDER — AMOXICILLIN 250 MG
1 CAPSULE ORAL
COMMUNITY

## 2019-09-28 RX ORDER — OMEPRAZOLE 20 MG/1
20 CAPSULE, DELAYED RELEASE ORAL DAILY
COMMUNITY

## 2019-09-28 RX ORDER — LANOLIN ALCOHOL/MO/W.PET/CERES
400 CREAM (GRAM) TOPICAL DAILY
Status: DISCONTINUED | OUTPATIENT
Start: 2019-09-29 | End: 2019-10-02 | Stop reason: HOSPADM

## 2019-09-28 RX ORDER — LEVETIRACETAM 250 MG/1
250 TABLET ORAL 2 TIMES DAILY
Status: DISCONTINUED | OUTPATIENT
Start: 2019-09-28 | End: 2019-10-02 | Stop reason: HOSPADM

## 2019-09-28 RX ORDER — ACETAMINOPHEN 325 MG/1
650 TABLET ORAL
Status: DISCONTINUED | OUTPATIENT
Start: 2019-09-28 | End: 2019-10-02 | Stop reason: HOSPADM

## 2019-09-28 RX ORDER — SODIUM CHLORIDE 0.9 % (FLUSH) 0.9 %
5-40 SYRINGE (ML) INJECTION EVERY 8 HOURS
Status: DISCONTINUED | OUTPATIENT
Start: 2019-09-28 | End: 2019-10-02 | Stop reason: HOSPADM

## 2019-09-28 RX ORDER — DONEPEZIL HYDROCHLORIDE 5 MG/1
5 TABLET, FILM COATED ORAL
Status: CANCELLED | OUTPATIENT
Start: 2019-09-28

## 2019-09-28 RX ORDER — CARBOXYMETHYLCELLULOSE SODIUM 5 MG/ML
2 SOLUTION/ DROPS OPHTHALMIC 2 TIMES DAILY
Status: DISCONTINUED | OUTPATIENT
Start: 2019-09-28 | End: 2019-10-02 | Stop reason: HOSPADM

## 2019-09-28 RX ADMIN — GABAPENTIN 300 MG: 300 CAPSULE ORAL at 22:22

## 2019-09-28 RX ADMIN — LEVETIRACETAM 250 MG: 250 TABLET ORAL at 22:15

## 2019-09-28 RX ADMIN — CARBOXYMETHYLCELLULOSE SODIUM 2 DROP: 5 SOLUTION/ DROPS OPHTHALMIC at 22:15

## 2019-09-28 RX ADMIN — Medication 10 ML: at 22:16

## 2019-09-28 NOTE — ROUTINE PROCESS
TRANSFER - OUT REPORT: 
 
Verbal report given to Danielle Solis RN (name) on Ashley Lozano  being transferred to [facility] (unit) for routine progression of care Report consisted of patients Situation, Background, Assessment and  
Recommendations(SBAR). Information from the following report(s) SBAR, ED Summary, [facility] and Recent Results was reviewed with the receiving nurse. Lines:  
Peripheral IV 09/28/19 Right;Upper Arm (Active) Site Assessment Clean, dry, & intact 9/28/2019  2:25 PM  
Phlebitis Assessment 0 9/28/2019  2:25 PM  
Infiltration Assessment 0 9/28/2019  2:25 PM  
Dressing Status Clean, dry, & intact 9/28/2019  2:25 PM  
Dressing Type Transparent 9/28/2019  2:25 PM  
Hub Color/Line Status Blue;Flushed 9/28/2019  2:25 PM  
Action Taken Blood drawn 9/28/2019  2:25 PM  
  
 
Opportunity for questions and clarification was provided. Patient transported with: 
 Monitor Registered Nurse 07-Oct-2018 20:16

## 2019-09-28 NOTE — PROGRESS NOTES
Admission Medication Reconciliation:    Information obtained from:  Patient's medication list from [address] [facility] [address]:  no    Comments/Recommendations: Updated PTA meds/reviewed patient's allergies. 1) Used medication list provided by [facility] facility. The last home doses were noted on transfer paperwork. 2)  Medication changes (since last review): Added  -Keppra XR (notified provider)  -Ativan IV (looks like it is a standard protocol for residents with new diagnoses of seizures at the [address])    Adjusted  - none    Removed  - Citalopram and donepezil (notified provider and removed order med)  -Lasix, volteran gel, lotions, robitussin, hydralazine, lisinopril, Zofran, Simethicone      4) Added Lisinopril to drug allergy list.     ¹[employer] pharmacy benefit data reflects medications filled and processed through the patient's insurance, however   this data does NOT capture whether the medication was picked up or is currently being taken by the patient. Allergies:  Latex; Aspirin; Isoniazid; Pcn [penicillins]; and Sulfa (sulfonamide antibiotics)    Significant PMH/Disease States:   Past Medical History:   Diagnosis Date    Asthma     Diabetes ([facility] [address]     Heart failure ([facility] [address]     Hypertension     Stroke [facility])      Chief Complaint for this Admission:    Chief Complaint   Patient presents with    Lethargy     Prior to Admission Medications:   Prior to Admission Medications   Prescriptions Last Dose Informant Patient Reported? Taking? LORazepam (ATIVAN) 2 mg/mL injection   Yes Yes   Si mg by IntraVENous route DIALYSIS PRN (seizure). acetaminophen (TYLENOL) 500 mg tablet   Yes Yes   Sig: Take 500 mg by mouth every eight (8) hours as needed for Pain. Patient received afternoon dose at [facility]   albuterol (ACCUNEB) 1.25 mg/3 mL nebu   Yes Yes   Si.25 mg by Nebulization route every four (4) hours as needed.    amLODIPine (NORVASC) 10 mg tablet 2019 at Unknown time Yes Yes   Sig: Take 10 mg by mouth nightly. clopidogrel (PLAVIX) 75 mg tablet 9/28/2019 at Unknown time Transfer Papers Yes Yes   Sig: Take 75 mg by mouth daily. gabapentin (NEURONTIN) 300 mg capsule 9/27/2019 at Unknown time  Yes Yes   Sig: Take 300 mg by mouth nightly. levETIRAcetam (KEPPRA XR) 500 mg ER tablet 9/28/2019 at Unknown time  Yes Yes   Sig: Take 500 mg by mouth daily. levothyroxine (SYNTHROID) 100 mcg tablet 9/28/2019 at Unknown time  Yes Yes   Sig: Take 100 mcg by mouth Daily (before breakfast). magnesium oxide (MAG-OX) 400 mg tablet 9/28/2019 at Unknown time  Yes Yes   Sig: Take 400 mg by mouth daily. omeprazole (PRILOSEC) 20 mg capsule 9/28/2019 at Unknown time  Yes Yes   Sig: Take 20 mg by mouth daily. polyethylene glycol (MIRALAX) 17 gram packet 9/28/2019 at Unknown time  Yes Yes   Sig: Take 17 g by mouth daily. Mix in 8 oz of water, juice, soda, coffee, or tea prior to administration   polyvinyl alcohol (LIQUIFILM TEARS) 1.4 % ophthalmic solution   Yes Yes   Sig: Administer 2 Drops to both eyes ACB/HS. senna-docusate (SENNA LAXATIVE-STOOL SOFTENER) 8.6-50 mg per tablet 9/27/2019 at Unknown time  Yes Yes   Sig: Take 1 Tab by mouth nightly. Facility-Administered Medications: None       Please contact the main inpatient pharmacy with any questions or concerns at [phone] and we will direct you to the clinical pharmacist covering this patient's care while in-house.    Humza Benson, MYAD

## 2019-09-28 NOTE — H&P
History & Physical    Primary Care Provider: Unknown, Provider  Source of Information: Patient    History of Presenting Illness:   Mrs Ebenezer White is an 79 y/o AAF with MHx of significant for heart failure, HTM ,DM, stroke and severe dementia presents to the hospital with c/o dizziness and unresponsiveness this morning. Patient is completely dependent on her ADL/IDL and bed bound on mechanical soft diet. [place] [facility] [place] Patient is a poor historian due to dementia therefore all the information was obtained from ED report, daughter and nursing home report. Apparently patient was found unresponsive this am and was noted to have heart rate in the 40's with EKG showing junctional bradyarrhythmia during EMS arrival. Per staff at the nursing home patient was having lethargy, fatigue and dizziness last one day. Her medication include amlodipine which was given this morning. On arrival to ER, patient was awake. During my interview she was able to tell that she is in a hospital.     No fever, nausea, vomiting, diarrhea or bleeding per rectum reported. Review of Systems:  Difficult to obtain due to dementia     Past Medical History:   Diagnosis Date    Asthma     Diabetes ([facility] [address]     Heart failure ([facility] [address]     Hypertension     Stroke [facility])       Past Surgical History:   Procedure Laterality Date    HX GYN      hysterectomy     Prior to Admission medications    Medication Sig Start Date End Date Taking? Authorizing Provider   camphor-mentbritney MARQUEZ [facility] ANTI-ITCH) 0.5-0.5 % lotion Apply  to affected area four (4) times daily as needed (neuropathich pain). Provider, Historical   diclofenac (VOLTAREN) 1 % gel Apply 2 g to affected area four (4) times daily as needed (Knee and shoulder pain). Provider, Historical   lisinopril (PRINIVIL, ZESTRIL) 40 mg tablet Take 40 mg by mouth daily.     Provider, Historical   magnesium oxide (MAG-OX) 400 mg tablet Take 400 mg by mouth daily. Provider, Historical   donepezil (ARICEPT) 5 mg tablet Take 5 mg by mouth nightly. Provider, Historical   ergocalciferol (ERGOCALCIFEROL) 50,000 unit capsule Take 100,000 Units by mouth every month. Provider, Historical   gabapentin (NEURONTIN) 300 mg capsule Take 300 mg by mouth nightly. Provider, Historical   polyethylene glycol (MIRALAX) 17 gram packet Take 17 g by mouth daily. Mix in 8 oz of water, juice, soda, coffee, or tea prior to administration    Provider, Historical   polyvinyl alcohol (LIQUIFILM TEARS) 1.4 % ophthalmic solution Administer 2 Drops to both eyes ACB/HS. Provider, Historical   guaiFENesin (ROBITUSSIN) 100 mg/5 mL liquid Take 200 mg by mouth every four (4) hours as needed for Cough. Provider, Historical   simethicone (MYLICON) 80 mg chewable tablet Take 160 mg by mouth every eight (8) hours as needed for Flatulence. Provider, Historical   ondansetron hcl (ZOFRAN, AS HYDROCHLORIDE,) 4 mg tablet Take 4 mg by mouth every eight (8) hours as needed for Nausea. Provider, Historical   acetaminophen (TYLENOL) 500 mg tablet Take 500 mg by mouth every eight (8) hours as needed for Pain. Patient received afternoon dose at [facility]    Provider, Historical   citalopram (CELEXA) 20 mg tablet Take 20 mg by mouth daily. Provider, Historical   furosemide (LASIX) 20 mg tablet Take 20 mg by mouth every Monday, Wednesday, Friday. Provider, Historical   hydrALAZINE (APRESOLINE) 100 mg tablet Take 50 mg by mouth three (3) times daily. Provider, Historical   levothyroxine (SYNTHROID) 100 mcg tablet Take 100 mcg by mouth Daily (before breakfast). Provider, Historical   amLODIPine (NORVASC) 2.5 mg tablet Take 2.5 mg by mouth nightly. Provider, Historical   clopidogrel (PLAVIX) 75 mg tablet Take 75 mg by mouth daily.     Other, MD Mark     Allergies   Allergen Reactions    Latex Rash    Aspirin Unknown (comments)    Isoniazid Other (comments)    Pcn [Penicillins] Rash    Sulfa (Sulfonamide Antibiotics) Rash      History reviewed. No pertinent family history. SOCIAL HISTORY:  Patient resides:  Independently    Assisted Living    SNF x   With family care       Smoking history:   None x   Former    Chronic      Alcohol history:   None x   Social    Chronic      Ambulates: Patient doesn't walk. Wheelchair bound   Independently    w/cane    w/walker    w/wc    CODE STATUS:  DNR x   Full    Other      Objective:     Physical Exam:     Visit Vitals  /72   Pulse (!) 43   Temp 98.5 °F (36.9 °C)   Resp 15   Ht 5' 3\" (1.6 m)   SpO2 98%   BMI 40.96 kg/m²      O2 Device: Room air    General:  Alert, not oriented    Head:  Normocephalic, without obvious abnormality, atraumatic. Eyes:  Conjunctivae/corneas clear. PERRL, EOMs intact. Nose: Nares normal. Septum midline. Mucosa normal. No drainage or sinus tenderness. Throat: Lips, mucosa, and tongue normal. Teeth and gums normal.   Neck: Supple, symmetrical, trachea midline, no adenopathy, thyroid: no enlargement/tenderness/nodules, no carotid bruit and no JVD. Back:   Symmetric, no curvature. ROM normal. No CVA tenderness. Lungs:   Clear to auscultation bilaterally. Chest wall:  No tenderness or deformity. Heart:  Bradycardia , S1, S2 normal, no murmur, click, rub or gallop. Abdomen:   Soft, non-tender. Bowel sounds normal. No masses,  No organomegaly. Extremities: Extremities normal, atraumatic, no cyanosis or edema. Pulses: 2+ and symmetric all extremities.    Skin: No rashes or lesions   Neurologic: Awake            Data Review:     Recent Days:  Recent Labs     09/28/19  1419   WBC 8.9   HGB 11.3*   HCT 36.5        Recent Labs     09/28/19  1522 09/28/19  1419     --    K 4.0  --      --    CO2 27  --      --    BUN 11  --    CREA 0.71  --    CA 8.9  --    MG 1.8  --    ALB 3.2*  --    TBILI 0.2  --    SGOT 12*  --    ALT 12  --    INR  --  1.0     No results for input(s): PH, PCO2, PO2, HCO3, FIO2 in the last 72 hours. 24 Hour Results:  Recent Results (from the past 24 hour(s))   EKG, 12 LEAD, INITIAL    Collection Time: 09/28/19  2:14 PM   Result Value Ref Range    Ventricular Rate 40 BPM    Atrial Rate 40 BPM    QRS Duration 78 ms    Q-T Interval 496 ms    QTC Calculation (Bezet) 404 ms    Calculated R Axis 1 degrees    Calculated T Axis 106 degrees    Diagnosis       Junctional bradycardia  Inferior infarct (cited on or before 27-JAN-2015)  Anterior infarct , age undetermined  When compared with ECG of 21-APR-2017 16:09,  Junctional rhythm has replaced Sinus rhythm  Vent. rate has decreased BY  23 BPM  Anterior infarct is now present  Inverted T waves have replaced nonspecific T wave abnormality in Lateral   leads  QT has shortened     CBC WITH AUTOMATED DIFF    Collection Time: 09/28/19  2:19 PM   Result Value Ref Range    WBC 8.9 3.6 - 11.0 K/uL    RBC 4.32 3.80 - 5.20 M/uL    HGB 11.3 (L) 11.5 - 16.0 g/dL    HCT 36.5 35.0 - 47.0 %    MCV 84.5 80.0 - 99.0 FL    MCH 26.2 26.0 - 34.0 PG    MCHC 31.0 30.0 - 36.5 g/dL    RDW 15.1 (H) 11.5 - 14.5 %    PLATELET 385 606 - 087 K/uL    MPV 9.4 8.9 - 12.9 FL    NRBC 0.0 0  WBC    ABSOLUTE NRBC 0.00 0.00 - 0.01 K/uL    NEUTROPHILS 58 32 - 75 %    LYMPHOCYTES 35 12 - 49 %    MONOCYTES 4 (L) 5 - 13 %    EOSINOPHILS 2 0 - 7 %    BASOPHILS 0 0 - 1 %    IMMATURE GRANULOCYTES 1 (H) 0.0 - 0.5 %    ABS. NEUTROPHILS 5.2 1.8 - 8.0 K/UL    ABS. LYMPHOCYTES 3.1 0.8 - 3.5 K/UL    ABS. MONOCYTES 0.4 0.0 - 1.0 K/UL    ABS. EOSINOPHILS 0.1 0.0 - 0.4 K/UL    ABS. BASOPHILS 0.0 0.0 - 0.1 K/UL    ABS. IMM.  GRANS. 0.0 0.00 - 0.04 K/UL    DF AUTOMATED     PROTHROMBIN TIME + INR    Collection Time: 09/28/19  2:19 PM   Result Value Ref Range    INR 1.0 0.9 - 1.1      Prothrombin time 10.4 9.0 - 11.1 sec   SAMPLES BEING HELD    Collection Time: 09/28/19  2:19 PM   Result Value Ref Range    SAMPLES BEING HELD  1 RED     COMMENT        Add-on orders for these samples will be processed based on acceptable specimen integrity and analyte stability, which may vary by analyte. GLUCOSE, POC    Collection Time: 09/28/19  2:40 PM   Result Value Ref Range    Glucose (POC) 97 65 - 100 mg/dL    Performed by Leonidas BALDWIN/ YUE MICROSCOPIC    Collection Time: 09/28/19  3:11 PM   Result Value Ref Range    Color YELLOW/STRAW      Appearance CLEAR CLEAR      Specific gravity 1.014 1.003 - 1.030      pH (UA) 6.5 5.0 - 8.0      Protein 30 (A) NEG mg/dL    Glucose NEGATIVE  NEG mg/dL    Ketone NEGATIVE  NEG mg/dL    Bilirubin NEGATIVE  NEG      Blood NEGATIVE  NEG      Urobilinogen 0.2 0.2 - 1.0 EU/dL    Nitrites NEGATIVE  NEG      Leukocyte Esterase NEGATIVE  NEG      WBC 0-4 0 - 4 /hpf    RBC 0-5 0 - 5 /hpf    Epithelial cells FEW FEW /lpf    Bacteria NEGATIVE  NEG /hpf    Hyaline cast 0-2 0 - 5 /lpf   URINE CULTURE HOLD SAMPLE    Collection Time: 09/28/19  3:11 PM   Result Value Ref Range    Urine culture hold        URINE ON HOLD IN MICROBIOLOGY DEPT FOR 3 DAYS. IF UNPRESERVED URINE IS SUBMITTED, IT CANNOT BE USED FOR ADDITIONAL TESTING AFTER 24 HRS, RECOLLECTION WILL BE REQUIRED. MAGNESIUM    Collection Time: 09/28/19  3:22 PM   Result Value Ref Range    Magnesium 1.8 1.6 - 2.4 mg/dL   METABOLIC PANEL, COMPREHENSIVE    Collection Time: 09/28/19  3:22 PM   Result Value Ref Range    Sodium 141 136 - 145 mmol/L    Potassium 4.0 3.5 - 5.1 mmol/L    Chloride 108 97 - 108 mmol/L    CO2 27 21 - 32 mmol/L    Anion gap 6 5 - 15 mmol/L    Glucose 100 65 - 100 mg/dL    BUN 11 6 - 20 MG/DL    Creatinine 0.71 0.55 - 1.02 MG/DL    BUN/Creatinine ratio 15 12 - 20      GFR est AA >60 >60 ml/min/1.73m2    GFR est non-AA >60 >60 ml/min/1.73m2    Calcium 8.9 8.5 - 10.1 MG/DL    Bilirubin, total 0.2 0.2 - 1.0 MG/DL    ALT (SGPT) 12 12 - 78 U/L    AST (SGOT) 12 (L) 15 - 37 U/L    Alk.  phosphatase 108 45 - 117 U/L    Protein, total 7.4 6.4 - 8.2 g/dL    Albumin 3.2 (L) 3.5 - 5.0 g/dL    Globulin 4.2 (H) 2.0 - 4.0 g/dL    A-G Ratio 0.8 (L) 1.1 - 2.2           Imaging:     Assessment:     # Syncope due to cardiac origin   # Junctional bradyarrhythmia/ SSS  # HTN  # DM  # Heart failure         Plan:     # Syncope due to cardiac origin  # Junctional bradyarrhythmia/SSS  - patient awake  - admit to tele unit and keep on telemetry   - fall precaution   - she doesn't need temporary pacemaker as she has a stable junction rhythm   - check troponin   - keep K~4, MG~2   - hold amlodipine and Plavix( if pacemaker insertion planned on Monday)   - Will arrange permanet pacemaker on Monday if family agrees       # HTN: ct with Lisinopril   # DM: accucheck and SSI   # Heart failure: well compensated ct with Lasix     DVT prop: Heparin   Code: DNR        Signed By: Micah Brooks MD     September 28, 2019

## 2019-09-28 NOTE — Clinical Note
Contrast: Isovue. Amount: 10 mL. Returned pts call. Pt stated on Friday Dr. Cleveland told her to go to the Er. Pt went and when she got there they said they didn't have any orders for her and that Dr. Cleveland tried calling her to tell her not to go to the Er. She said they said Dr. Cleveland called her house but she was gone already. Pt stated the Er sent her home. She said Dr. Cleveland wanted to see her in office today but she can't afford it. She said she had to pay $75 at the Er Friday and if she comes in here today it will be another $50. Pt stated she's taking 80 mg lasix in the AM and 80 mg of lasix in the afternoon. Pt said her BP & weight readings are as followed:   10/20 - 131/60 wt 175  10/21 - 131/60 wt 174  10/22 - 151/65 wt 174  10/23 - 124/47 wt 175    Informed pt I would let Dr. Cleveland know what is going on and give her a call back. Pt verbalized understanding. No further issues discussed.

## 2019-09-28 NOTE — Clinical Note
Patient transported with a Cardiac Cath Tech / Patient Care Tech. Patient transported to: HOLDING AREA. Report to be given at bedside in the holding area.

## 2019-09-28 NOTE — ED TRIAGE NOTES
Patient arrives via EMS from [facility] court for increased dizziness and lethargy for 24 hours.   Patient with near syncopal episode this AM.  EMS reports HR 40s
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
## 2019-09-28 NOTE — ED PROVIDER NOTES
80 y.o. female with past medical history significant for heart failure, stroke, HTN, DM, asthma who presents from [facility] via EMS with chief complaint of dizziness. Per staff at the pt's nursing home, pt has had increased lethargy, fatigue and dizziness for the past 24 hours, with a near syncopal episode this morning. Pt takes Narvasc, last dose this AM at 1000. Denies fever. On EMS arrival, pt was found to be bradycardic, which she has a hx of. There are no other acute medical concerns at this time. Social hx: denies tobacco use, denies EtOH consumption     Full history, physical exam, and ROS unable to be obtained due to:  dementia. Note written by Mike Mckeon, as dictated by Gypsy Angel MD 2:08 PM      The history is provided by the patient and the EMS personnel. No  was used. Past Medical History:   Diagnosis Date    Asthma     Diabetes ([facility] [address]     Heart failure ([facility] [address]     Hypertension     Stroke [facility])        Past Surgical History:   Procedure Laterality Date    HX GYN      hysterectomy         History reviewed. No pertinent family history.     Social History     Socioeconomic History    Marital status:      Spouse name: Not on file    Number of children: Not on file    Years of education: Not on file    Highest education level: Not on file   Occupational History    Not on file   Social Needs    Financial resource strain: Not on file    Food insecurity:     Worry: Not on file     Inability: Not on file    Transportation needs:     Medical: Not on file     Non-medical: Not on file   Tobacco Use    Smoking status: Never Smoker   Substance and Sexual Activity    Alcohol use: No    Drug use: No    Sexual activity: Not on file   Lifestyle    Physical activity:     Days per week: Not on file     Minutes per session: Not on file    Stress: Not on file   Relationships    Social connections:     Talks on phone: Not on file     Gets together: Not on file     Attends Confucianism service: Not on file     Active member of club or organization: Not on file     Attends meetings of clubs or organizations: Not on file     Relationship status: Not on file    Intimate partner violence:     Fear of current or ex partner: Not on file     Emotionally abused: Not on file     Physically abused: Not on file     Forced sexual activity: Not on file   Other Topics Concern    Not on file   Social History Narrative    Not on file         ALLERGIES: Latex; Aspirin; Isoniazid; Pcn [penicillins]; and Sulfa (sulfonamide antibiotics)    Review of Systems   Unable to perform ROS: Dementia       Vitals:    09/28/19 1530 09/28/19 1630 09/28/19 1850 09/28/19 1932   BP: 141/55 157/72 150/70 156/46   Pulse: (!) 41 (!) 43 73 (!) 43   Resp: 13 15 22 20   Temp:  97.9 °F (36.6 °C) 98.2 °F (36.8 °C) 98.2 °F (36.8 °C)   SpO2: 100% 98% 100% 100%   Weight:   87.7 kg (193 lb 5.5 oz)    Height:                Physical Exam   Constitutional: She is oriented to person, place, and time. She appears well-developed and well-nourished. HENT:   Head: Normocephalic and atraumatic. Eyes: Pupils are equal, round, and reactive to light. Neck: Normal range of motion. Neck supple. Cardiovascular: Regular rhythm. Bradycardia present. Pulmonary/Chest: Effort normal.   Slight end expiratory wheeze    Abdominal: Soft. She exhibits no distension. There is no tenderness. Neurological: She is alert and oriented to person, place, and time. Skin: Skin is warm and dry. Capillary refill takes less than 2 seconds. Psychiatric: She has a normal mood and affect. Her behavior is normal.   Nursing note and vitals reviewed. Note written by Mike Driscoll, as dictated by Alan Coon MD 2:14 PM        MDM       Procedures    ED EKG interpretation:  Rhythm: junctional bradycardia; and regular . Rate (approx.): 40; Axis: normal; ST/T wave: normal; no ectopy.   Note written by Caryn Reardon Kerri Boast, Scribe, as dictated by Reuel Boxer, MD 2:17 PM    CONSULT NOTE:  2:38 PM Reuel Boxer, MD spoke with Dr. Fletcher Saucedo, Consult for Cardiology. Discussed available diagnostic tests and clinical findings. Dr. Fletcher Saucedo will see the pt and recommends admission to the hospitalist.       Patient is being admitted to the hospital.  The results of their tests and reasons for their admission have been discussed with them and/or available family. They convey agreement and understanding for the need to be admitted and for their admission diagnosis. Consultation will be made now with the inpatient physician for hospitalization. Hospitalist Dieter for Admission  4:01 PM    ED Room Number: ER24/24  Patient Name and age:  Aracelis Aquino 80 y.o.  female  Working Diagnosis:   1. Junctional bradycardia    2.  Syncope and collapse      Readmission: no  Isolation Requirements:  no  Recommended Level of Care:  telemetry  Code Status:  Do Not Resuscitate  Department:[facility] Adult ED - 21   Other:  Cardiology aware

## 2019-09-28 NOTE — Clinical Note
Sheath #1: Dressed using topical skin adhesive dressing. Site: clean, dry, & intact, no bleeding and no hematoma.

## 2019-09-28 NOTE — CONSULTS
Cardiology Note dictated # [id number]  Imp:  The patient had an episode of unresponsiveness at [facility] this AM with associated junctional bradycardia. Also has recent onset of seizures. Dementia   Recommendations:  Admit to the hospitalist service; telemetry  No need for PM today as the patient has a stable junction rhythm with a narrow QRS complex and a good BP  Will arrange for Dr Amy Vu to insert PPM tentatively on Monday. The daughter will discuss this with her brother to decide if this is what they want.   Thank you for this referral.  Luci Hernandez MD  [facility]  Interventional Cardiology

## 2019-09-29 PROCEDURE — 65660000000 HC RM CCU STEPDOWN

## 2019-09-29 PROCEDURE — 74011250637 HC RX REV CODE- 250/637: Performed by: INTERNAL MEDICINE

## 2019-09-29 RX ADMIN — ACETAMINOPHEN 650 MG: 325 TABLET, FILM COATED ORAL at 09:07

## 2019-09-29 RX ADMIN — CARBOXYMETHYLCELLULOSE SODIUM 2 DROP: 5 SOLUTION/ DROPS OPHTHALMIC at 09:04

## 2019-09-29 RX ADMIN — Medication 10 ML: at 15:10

## 2019-09-29 RX ADMIN — GABAPENTIN 300 MG: 300 CAPSULE ORAL at 21:36

## 2019-09-29 RX ADMIN — LEVOTHYROXINE SODIUM 100 MCG: 100 TABLET ORAL at 06:49

## 2019-09-29 RX ADMIN — MAGNESIUM OXIDE TAB 400 MG (241.3 MG ELEMENTAL MG) 400 MG: 400 (241.3 MG) TAB at 08:54

## 2019-09-29 RX ADMIN — LEVETIRACETAM 250 MG: 250 TABLET ORAL at 21:36

## 2019-09-29 RX ADMIN — LEVETIRACETAM 250 MG: 250 TABLET ORAL at 08:54

## 2019-09-29 RX ADMIN — Medication 10 ML: at 21:36

## 2019-09-29 RX ADMIN — Medication 10 ML: at 06:47

## 2019-09-29 RX ADMIN — CARBOXYMETHYLCELLULOSE SODIUM 2 DROP: 5 SOLUTION/ DROPS OPHTHALMIC at 21:59

## 2019-09-29 NOTE — PROGRESS NOTES
9/29/2019     Admit Date: 9/28/2019    Admit Diagnosis: AV junctional bradycardia [R00.1]    Active Problems:    AV junctional bradycardia (9/28/2019)        Assessment/Plan:  Did well overnight  She remains in  Stable narrow complex junctional rhythm  The patient's sons and daughter wish to proceed with a PPM  See orders     Subjective:  No complaints today       Hector Yip denies chest pain. Objective:     Visit Vitals  /51 (BP 1 Location: Left arm, BP Patient Position: At rest)   Pulse (!) 38   Temp 97.9 °F (36.6 °C)   Resp 16   Ht 160 cm (63\")   Wt 88.3 kg (194 lb 10.7 oz)   SpO2 100%   Breastfeeding? No   BMI 34.48 kg/m²        Physical Exam:  Neck: supple, symmetrical, trachea midline, no adenopathy, thyroid: not enlarged, symmetric, no tenderness/mass/nodules, no carotid bruit and no JVD  Heart: irregularly irregular rhythm, S1, S2 normal  Lungs: clear to auscultation bilaterally, normal percussion bilaterally  Abdomen: soft, non-tender. Bowel sounds normal. No masses,  no organomegaly  Extremities: extremities normal, atraumatic, no cyanosis or edema  Pulses: 2+ and symmetric  Neurologic: Mental status: alertness: alert, orientation: place  Motor:grossly normal      Labs:    Recent Labs     09/28/19  1640   TROIQ <0.05     Recent Labs     09/28/19  1522      K 4.0      BUN 11   CREA 0.71      CA 8.9     Recent Labs     09/28/19  1419   WBC 8.9   HGB 11.3*   HCT 36.5        No results for input(s): CHOL, LDLC in the last 72 hours.     No lab exists for component: TGL, HDLC,  HBA1C    Telemetry: junctional escape rhythm     Data Review: current meds, labs,recent radiology, intake/output/weight and problem list reviewed

## 2019-09-29 NOTE — CONSULTS
[address]    Name:  Rin Paniagua  MR#:  [id number]  :  1933  ACCOUNT #:  [de-identified]  DATE OF SERVICE:  2019      REFERRING PHYSICIAN:  Lupe Ford MD    HISTORY OF PRESENT ILLNESS:  The patient was brought here by rescue squad from [facility] after an episode of unresponsiveness this morning. She was noted to have a very slow heart rate and was noted to be in a junctional rhythm. She is awake and alert now, and her baseline is currently very severe dementia. The daughter is present. OTHER MEDICAL PROBLEMS:  Recent onset of seizures, diabetes mellitus, congestive heart failure, hypertension, and previous stroke. ALLERGIES:  TO FOLLOWING MEDICATIONS, LATEX, ASPIRIN, ISONIAZID, PENICILLIN, SULFONAMIDE ANTIBIOTICS. MEDICATIONS:  Medication list prior to admission is as follows:  Lisinopril 40 mg daily; magnesium oxide 400 mg daily; donepezil 5 mg nightly; ergocalciferol 100,000 units every month; gabapentin 300 mg nightly; MiraLAX 17 g daily; Liquifilm Tears; guaifenesin; simethicone; ondansetron; acetaminophen; citalopram; furosemide 20 mg , , ; hydralazine  takes 50 mg three times a day; levothyroxine 100 mcg; amlodipine 2.5 mg; and clopidogrel 75 mg. PHYSICAL EXAMINATION:  GENERAL:  The patient is quite talkative and at times answers direct questions, other times is hostile at times. She is in no distress. VITAL SIGNS:  Heart rate 40, respirations 17, saturations 100% on room air. HEENT:  Unremarkable. NECK:  Supple. No adenopathy. CHEST:  Chest wall is unremarkable to expansion. LUNGS:  Anteriorly were clear to auscultation and percussion. HEART:  Regular, bradycardia. No S3 gallop or friction rub. No thrills, lifts, or heaves. ABDOMEN:  Soft and nontender. No bruits. No ascites. No palpable masses. EXTREMITIES:  No edema. Normal pedal pulses.   NEUROLOGIC:  The patient is awake and alert and at times appropriate. No focal motor signs detected. The daughter indicates to me that she is essentially bed bound. DIAGNOSTIC DATA:  EKG demonstrates junctional rhythm, narrow QRS complex with 40 beats per minute with inferior and anterior infarcts previously cited. Chest x-ray demonstrates no active disease. Her basic labs are unremarkable. IMPRESSION:  This patient has sick sinus syndrome and now with stable junctional rhythm at 40, narrow QRS complex, blood pressure is fine. She is awake and alert and perfusing. RECOMMENDATIONS:  1. Admit to the hospitalist service for observation. At this time, there is no need for a temporary pacemaker for reasons stated above. 2.  We will arrange for Dr. Joel Lauren to insert a permanent pacemaker on Monday if this is what the daughter and her brother decide they want to do. Further recommendations to follow.     Thank you for this referral.      Jonathan Meeks MD      [id number]  D:  09/28/2019 16:18  T:  09/29/2019 0:52  JOB #:  [id number]

## 2019-09-29 NOTE — PROGRESS NOTES
Reason for Admission:  Patient was found unresponsive from [address] Score:  8                   Plan for utilizing home health: Anticipate none- patient is currently at [facility]- family endorses that patient is in [address] at [facility]. Current Advanced Directive/Advance Care Plan: Listed as DNR- daughter at bedside Rolena endorses that the patient's son James Fajardo is MPOA (not on file). CM contacted [facility] and asked them to fax this writer DNR/AMD information if on file. Transition of Care Plan: Anticipate discharge back to [facility]     The CM met with the patient and daughter Jonatan Jorgensen at bedside in order to introduce the role of CM and assess for patient needs. The patient has dementia- alert but unable to provide history. The patient is followed by [facility] ([phone]) CM confirmed with on-call [facility] RN- confirmed patient resides in LTC at [facility], [facility] patient. CM will speak further with DEBBIE SW Monday- [facility] staff unavailable on Sunday. The CM called and spoke with SEJAL Connolly at [facility]- patient has been in [address] [place] [address] since 2017, CM asked for facility to fax over DNR/AMD information, provided Case Management fax number. The patient is bed bound at baseline per daughter, requires assistance with all ADLs, does not ambulate. [facility] provides medication management, etc. Patient utilizes Oxygen PRN at facility, also receives breathing treatments. The daughter endorses the patient will return to [facility] upon discharge- son James Fajardo is MPOA per daughter (not on file). CM will continue to follow and follow-up with family/DEBBIE Monday.  NEAL Robertson    Care Management Interventions  Mode of Transport at Discharge: BLS(Patient is bed bound, will require BLS transport upon discharge)  Transition of Care Consult (CM Consult): Discharge Planning  MyChart Signup: No  Discharge Durable Medical Equipment: No  Health Maintenance Reviewed: Yes  Physical Therapy Consult: No  Occupational Therapy Consult: No  Speech Therapy Consult: No  Current Support Network: Nursing Facility(Daughter endorses that patient resides at [address] [place] [address] )  Confirm Follow Up Transport: Other (see comment)(Patient will require ambulance transport upon discharge)  Plan discussed with Pt/Family/Caregiver: Yes  Discharge Location  Discharge Placement: Skilled nursing facility

## 2019-09-29 NOTE — PROGRESS NOTES
Hospitalist Progress Note  Lula Reardon MD  Answering service: [phone] from in house phone      Date of Service:  2019  NAME:  Royer Hernandez  :  3/8/1933  MRN:  [id number]      Admission Summary:   Mrs Jayson Quesada is an 79 y/o AAF with MHx of significant for heart failure, HTM ,DM, stroke and severe dementia presents to the hospital with c/o dizziness and unresponsiveness this morning. Patient is completely dependent on her ADL/IDL and bed bound on mechanical soft diet. She is a resident of [facility]. Patient is a poor historian due to dementia therefore all the information was obtained from ED report, daughter and nursing home report. Apparently patient was found unresponsive this am and was noted to have heart rate in the 40's with EKG showing junctional bradyarrhythmia during EMS arrival. Per staff at the nursing home patient was having lethargy, fatigue and dizziness last one day. Her medication include amlodipine which was given this morning. On arrival to ER, patient was awake. During my interview she was able to tell that she is in a hospital.     Interval history / Subjective:      Patient seen and examined this morning. She is awake. Family at bedside. No new event but patient remained on stable junctional rhythm.     Assessment & Plan:     # Syncope due to cardiac origin  # AV Junctional bradyarrhythmia/SSS  - troponin negative   - keep K~4, MG~2   - hold amlodipine and   - hold Plavix for surgery tomorrow   - Family agreed for permanent pacemaker placement- Plan tomorrow   - NPO after midnight   - Cardiology input appt        # HTN: ct with Lisinopril   # DM: accucheck and SSI   # Heart failure: well compensated- ct with Lasix   # Severe dementia: stable      DVT prop: Heparin   Code: DNR     Baseline functional status: bed bound      Hospital Problems  Date Reviewed: 2017          Codes Class Noted POA AV junctional bradycardia ICD-10-CM: R00.1  ICD-9-CM: 427.89  9/28/2019 Unknown                Review of Systems:   Not possible since patient has dementia     Vital Signs:    Last 24hrs VS reviewed since prior progress note. Most recent are:  Visit Vitals  BP (!) 146/93 (BP 1 Location: Left arm, BP Patient Position: At rest)   Pulse 64   Temp 98.1 °F (36.7 °C)   Resp 18   Ht 5' 3\" (1.6 m)   Wt 88.3 kg (194 lb 10.7 oz)   SpO2 100%   Breastfeeding? No   BMI 34.48 kg/m²         Intake/Output Summary (Last 24 hours) at 9/29/2019 1527  Last data filed at 9/29/2019 1507  Gross per 24 hour   Intake 480 ml   Output    Net 480 ml        Physical Examination:             Constitutional:  No acute distress,   ENT:  Oral mucous moist, oropharynx benign. Neck supple,    Resp:  CTA bilaterally. No wheezing/rhonchi/rales. No accessory muscle use   CV:  Regular rhythm, normal rate, no murmurs, gallops, rubs    GI:  Soft, non distended, non tender. normoactive bowel sounds, no hepatosplenomegaly     Musculoskeletal:  No edema, warm, 2+ pulses throughout    Neurologic:  CN II-XII reviewed            Data Review:   I personally reviewed labs and imaging     Labs:     Recent Labs     09/28/19  1419   WBC 8.9   HGB 11.3*   HCT 36.5        Recent Labs     09/28/19  1522      K 4.0      CO2 27   BUN 11   CREA 0.71      CA 8.9   MG 1.8     Recent Labs     09/28/19  1522   SGOT 12*   ALT 12      TBILI 0.2   TP 7.4   ALB 3.2*   GLOB 4.2*     Recent Labs     09/28/19  1419   INR 1.0   PTP 10.4      No results for input(s): FE, TIBC, PSAT, FERR in the last 72 hours. No results found for: FOL, RBCF   No results for input(s): PH, PCO2, PO2 in the last 72 hours.   Recent Labs     09/28/19  1640   TROIQ <0.05     Lab Results   Component Value Date/Time    Cholesterol, total 142 01/28/2015 03:59 AM    HDL Cholesterol 54 01/28/2015 03:59 AM    LDL, calculated 76.4 01/28/2015 03:59 AM    Triglyceride 58 01/28/2015 03:59 AM    CHOL/HDL Ratio 2.6 01/28/2015 03:59 AM     Lab Results   Component Value Date/Time    Glucose (POC) 97 09/28/2019 02:40 PM    Glucose (POC) 253 (H) 04/28/2017 11:57 AM    Glucose (POC) 273 (H) 04/28/2017 06:34 AM    Glucose (POC) 251 (H) 04/28/2017 12:38 AM    Glucose (POC) 257 (H) 04/27/2017 06:19 PM     Lab Results   Component Value Date/Time    Color YELLOW/STRAW 09/28/2019 03:11 PM    Appearance CLEAR 09/28/2019 03:11 PM    Specific gravity 1.014 09/28/2019 03:11 PM    pH (UA) 6.5 09/28/2019 03:11 PM    Protein 30 (A) 09/28/2019 03:11 PM    Glucose NEGATIVE  09/28/2019 03:11 PM    Ketone NEGATIVE  09/28/2019 03:11 PM    Bilirubin NEGATIVE  09/28/2019 03:11 PM    Urobilinogen 0.2 09/28/2019 03:11 PM    Nitrites NEGATIVE  09/28/2019 03:11 PM    Leukocyte Esterase NEGATIVE  09/28/2019 03:11 PM    Epithelial cells FEW 09/28/2019 03:11 PM    Bacteria NEGATIVE  09/28/2019 03:11 PM    WBC 0-4 09/28/2019 03:11 PM    RBC 0-5 09/28/2019 03:11 PM         Medications Reviewed:     Current Facility-Administered Medications   Medication Dose Route Frequency    sodium chloride (NS) flush 5-40 mL  5-40 mL IntraVENous Q8H    sodium chloride (NS) flush 5-40 mL  5-40 mL IntraVENous PRN    acetaminophen (TYLENOL) tablet 650 mg  650 mg Oral Q4H PRN    gabapentin (NEURONTIN) capsule 300 mg  300 mg Oral QHS    levothyroxine (SYNTHROID) tablet 100 mcg  100 mcg Oral ACB    magnesium oxide (MAG-OX) tablet 400 mg  400 mg Oral DAILY    polyethylene glycol (MIRALAX) packet 17 g  17 g Oral DAILY    carboxymethylcellulose sodium (REFRESH PLUS) 0.5 % ophthalmic solution 2 Drop  2 Drop Both Eyes BID    levETIRAcetam (KEPPRA) tablet 250 mg  250 mg Oral BID    influenza vaccine 2019-20 (6 mos+)(PF) (FLUARIX/FLULAVAL/FLUZONE QUAD) injection 0.5 mL  0.5 mL IntraMUSCular PRIOR TO DISCHARGE     ______________________________________________________________________  EXPECTED LENGTH OF STAY: - - -  ACTUAL LENGTH OF STAY:          1                 Violet Hutchinson MD   Patient has given Verbal permission to discuss medical care with   persons present in the room and and also with contact as listed on face sheet.

## 2019-09-29 NOTE — PROGRESS NOTES
2015: Receive report from [facility], [facility]. Patient resting in bed at this time. HR at this time is 33-low 40's at this time. Patient was admitted for bradycardia and will have PPM place possibly on Monday. 2245: Paged on-stoney regarding patient's HR dropping to the low 30's (31-34) and DDNR order at this time  2250: Lita Hand NP on unit at this time have reviewed patient's chart  2307: Pink sheet sign and on patient's chart also and DDNR also. 3145: Patient heart rate overnight drop down into low 30's while resting. 0730: Bedside and Verbal shift change report given to DUNIA Iyer (oncoming nurse) by Alicia Arteaga (offgoing nurse).  Report included the following information SBAR, Kardex, Intake/Output, MAR, Med Rec Status and Cardiac Rhythm SB.

## 2019-09-30 ENCOUNTER — APPOINTMENT (OUTPATIENT)
Dept: GENERAL RADIOLOGY | Age: 84
DRG: 243 | End: 2019-09-30
Attending: INTERNAL MEDICINE
Payer: MEDICARE

## 2019-09-30 LAB
ANION GAP SERPL CALC-SCNC: 7 MMOL/L (ref 5–15)
BUN SERPL-MCNC: 11 MG/DL (ref 6–20)
BUN/CREAT SERPL: 18 (ref 12–20)
CALCIUM SERPL-MCNC: 8.2 MG/DL (ref 8.5–10.1)
CHLORIDE SERPL-SCNC: 111 MMOL/L (ref 97–108)
CO2 SERPL-SCNC: 22 MMOL/L (ref 21–32)
CREAT SERPL-MCNC: 0.62 MG/DL (ref 0.55–1.02)
ERYTHROCYTE [DISTWIDTH] IN BLOOD BY AUTOMATED COUNT: 14.9 % (ref 11.5–14.5)
GLUCOSE SERPL-MCNC: 76 MG/DL (ref 65–100)
HCT VFR BLD AUTO: 34.5 % (ref 35–47)
HGB BLD-MCNC: 10.4 G/DL (ref 11.5–16)
MCH RBC QN AUTO: 25.6 PG (ref 26–34)
MCHC RBC AUTO-ENTMCNC: 30.1 G/DL (ref 30–36.5)
MCV RBC AUTO: 85 FL (ref 80–99)
NRBC # BLD: 0 K/UL (ref 0–0.01)
NRBC BLD-RTO: 0 PER 100 WBC
PLATELET # BLD AUTO: 275 K/UL (ref 150–400)
PMV BLD AUTO: 9.4 FL (ref 8.9–12.9)
POTASSIUM SERPL-SCNC: 4 MMOL/L (ref 3.5–5.1)
RBC # BLD AUTO: 4.06 M/UL (ref 3.8–5.2)
SODIUM SERPL-SCNC: 140 MMOL/L (ref 136–145)
WBC # BLD AUTO: 7.9 K/UL (ref 3.6–11)

## 2019-09-30 PROCEDURE — 77030012935 HC DRSG AQUACEL BMS -B: Performed by: INTERNAL MEDICINE

## 2019-09-30 PROCEDURE — 90686 IIV4 VACC NO PRSV 0.5 ML IM: CPT | Performed by: INTERNAL MEDICINE

## 2019-09-30 PROCEDURE — 02HK3JZ INSERTION OF PACEMAKER LEAD INTO RIGHT VENTRICLE, PERCUTANEOUS APPROACH: ICD-10-PCS | Performed by: INTERNAL MEDICINE

## 2019-09-30 PROCEDURE — C1898 LEAD, PMKR, OTHER THAN TRANS: HCPCS | Performed by: INTERNAL MEDICINE

## 2019-09-30 PROCEDURE — 80048 BASIC METABOLIC PNL TOTAL CA: CPT

## 2019-09-30 PROCEDURE — 33208 INSRT HEART PM ATRIAL & VENT: CPT | Performed by: INTERNAL MEDICINE

## 2019-09-30 PROCEDURE — C1785 PMKR, DUAL, RATE-RESP: HCPCS | Performed by: INTERNAL MEDICINE

## 2019-09-30 PROCEDURE — 90471 IMMUNIZATION ADMIN: CPT

## 2019-09-30 PROCEDURE — 77030018673: Performed by: INTERNAL MEDICINE

## 2019-09-30 PROCEDURE — 36415 COLL VENOUS BLD VENIPUNCTURE: CPT

## 2019-09-30 PROCEDURE — 77030022017 HC DRSG HEMO QCLOT ZMED -A: Performed by: INTERNAL MEDICINE

## 2019-09-30 PROCEDURE — 74011250636 HC RX REV CODE- 250/636: Performed by: INTERNAL MEDICINE

## 2019-09-30 PROCEDURE — 77030031139 HC SUT VCRL2 J&J -A: Performed by: INTERNAL MEDICINE

## 2019-09-30 PROCEDURE — 74011000250 HC RX REV CODE- 250: Performed by: INTERNAL MEDICINE

## 2019-09-30 PROCEDURE — 71045 X-RAY EXAM CHEST 1 VIEW: CPT

## 2019-09-30 PROCEDURE — C1892 INTRO/SHEATH,FIXED,PEEL-AWAY: HCPCS | Performed by: INTERNAL MEDICINE

## 2019-09-30 PROCEDURE — 77030019580 HC CBL PACE MEDT -B: Performed by: INTERNAL MEDICINE

## 2019-09-30 PROCEDURE — 65660000000 HC RM CCU STEPDOWN

## 2019-09-30 PROCEDURE — 85027 COMPLETE CBC AUTOMATED: CPT

## 2019-09-30 PROCEDURE — 77030039046 HC PAD DEFIB RADIOTRNSPNT CNMD -B: Performed by: INTERNAL MEDICINE

## 2019-09-30 PROCEDURE — A4565 SLINGS: HCPCS | Performed by: INTERNAL MEDICINE

## 2019-09-30 PROCEDURE — 99153 MOD SED SAME PHYS/QHP EA: CPT | Performed by: INTERNAL MEDICINE

## 2019-09-30 PROCEDURE — 74011636320 HC RX REV CODE- 636/320: Performed by: INTERNAL MEDICINE

## 2019-09-30 PROCEDURE — 0JH606Z INSERTION OF PACEMAKER, DUAL CHAMBER INTO CHEST SUBCUTANEOUS TISSUE AND FASCIA, OPEN APPROACH: ICD-10-PCS | Performed by: INTERNAL MEDICINE

## 2019-09-30 PROCEDURE — 99152 MOD SED SAME PHYS/QHP 5/>YRS: CPT | Performed by: INTERNAL MEDICINE

## 2019-09-30 PROCEDURE — 74011250636 HC RX REV CODE- 250/636

## 2019-09-30 PROCEDURE — 74011250637 HC RX REV CODE- 250/637: Performed by: INTERNAL MEDICINE

## 2019-09-30 PROCEDURE — 02H63JZ INSERTION OF PACEMAKER LEAD INTO RIGHT ATRIUM, PERCUTANEOUS APPROACH: ICD-10-PCS | Performed by: INTERNAL MEDICINE

## 2019-09-30 PROCEDURE — 77030002996 HC SUT SLK J&J -A: Performed by: INTERNAL MEDICINE

## 2019-09-30 DEVICE — IPG W1DR01 AZURE XT DR MRI WL USA
Type: IMPLANTABLE DEVICE | Site: CHEST  WALL | Status: FUNCTIONAL
Brand: AZURE™ XT DR MRI SURESCAN™

## 2019-09-30 DEVICE — LEAD PCMKR CAPSUR FIX NOVUS 52 --: Type: IMPLANTABLE DEVICE | Site: HEART | Status: FUNCTIONAL

## 2019-09-30 DEVICE — LEAD PCMKR 58CM -- CAPSURE SENSE MRI SURESCAN: Type: IMPLANTABLE DEVICE | Site: HEART | Status: FUNCTIONAL

## 2019-09-30 RX ORDER — SODIUM CHLORIDE 0.9 % (FLUSH) 0.9 %
5-40 SYRINGE (ML) INJECTION AS NEEDED
Status: DISCONTINUED | OUTPATIENT
Start: 2019-09-30 | End: 2019-09-30 | Stop reason: HOSPADM

## 2019-09-30 RX ORDER — VANCOMYCIN/0.9 % SOD CHLORIDE 1.5G/250ML
1.5 PLASTIC BAG, INJECTION (ML) INTRAVENOUS ONCE
Status: COMPLETED | OUTPATIENT
Start: 2019-09-30 | End: 2019-09-30

## 2019-09-30 RX ORDER — LIDOCAINE HYDROCHLORIDE AND EPINEPHRINE 10; 10 MG/ML; UG/ML
INJECTION, SOLUTION INFILTRATION; PERINEURAL AS NEEDED
Status: DISCONTINUED | OUTPATIENT
Start: 2019-09-30 | End: 2019-09-30 | Stop reason: HOSPADM

## 2019-09-30 RX ORDER — CIPROFLOXACIN 250 MG/1
250 TABLET, FILM COATED ORAL EVERY 12 HOURS
Status: DISCONTINUED | OUTPATIENT
Start: 2019-10-01 | End: 2019-10-02 | Stop reason: HOSPADM

## 2019-09-30 RX ORDER — SODIUM CHLORIDE 0.9 % (FLUSH) 0.9 %
5-40 SYRINGE (ML) INJECTION EVERY 8 HOURS
Status: DISCONTINUED | OUTPATIENT
Start: 2019-09-30 | End: 2019-09-30 | Stop reason: HOSPADM

## 2019-09-30 RX ORDER — FENTANYL CITRATE 50 UG/ML
INJECTION, SOLUTION INTRAMUSCULAR; INTRAVENOUS AS NEEDED
Status: DISCONTINUED | OUTPATIENT
Start: 2019-09-30 | End: 2019-09-30 | Stop reason: HOSPADM

## 2019-09-30 RX ORDER — SODIUM CHLORIDE 0.9 % (FLUSH) 0.9 %
5-40 SYRINGE (ML) INJECTION AS NEEDED
Status: DISCONTINUED | OUTPATIENT
Start: 2019-09-30 | End: 2019-10-02 | Stop reason: HOSPADM

## 2019-09-30 RX ORDER — SODIUM CHLORIDE 0.9 % (FLUSH) 0.9 %
5-40 SYRINGE (ML) INJECTION EVERY 8 HOURS
Status: DISCONTINUED | OUTPATIENT
Start: 2019-09-30 | End: 2019-10-02 | Stop reason: HOSPADM

## 2019-09-30 RX ADMIN — Medication 10 ML: at 22:02

## 2019-09-30 RX ADMIN — Medication 10 ML: at 22:01

## 2019-09-30 RX ADMIN — LEVETIRACETAM 250 MG: 250 TABLET ORAL at 22:19

## 2019-09-30 RX ADMIN — GABAPENTIN 300 MG: 300 CAPSULE ORAL at 21:59

## 2019-09-30 RX ADMIN — LEVETIRACETAM 250 MG: 250 TABLET ORAL at 09:03

## 2019-09-30 RX ADMIN — CARBOXYMETHYLCELLULOSE SODIUM 2 DROP: 5 SOLUTION/ DROPS OPHTHALMIC at 09:10

## 2019-09-30 RX ADMIN — SODIUM CHLORIDE 250 ML: 900 INJECTION, SOLUTION INTRAVENOUS at 05:39

## 2019-09-30 RX ADMIN — SODIUM CHLORIDE 250 ML: 900 INJECTION, SOLUTION INTRAVENOUS at 03:22

## 2019-09-30 RX ADMIN — Medication 10 ML: at 05:23

## 2019-09-30 RX ADMIN — CARBOXYMETHYLCELLULOSE SODIUM 2 DROP: 5 SOLUTION/ DROPS OPHTHALMIC at 21:59

## 2019-09-30 RX ADMIN — INFLUENZA VIRUS VACCINE 0.5 ML: 15; 15; 15; 15 SUSPENSION INTRAMUSCULAR at 21:52

## 2019-09-30 RX ADMIN — LEVOTHYROXINE SODIUM 100 MCG: 100 TABLET ORAL at 06:25

## 2019-09-30 NOTE — PROGRESS NOTES
Transitions of Care:  Patient transferred from Cardiac CATH lab s/p pacemaker insertion. Patient remains on telemetry and is a long term patient at [facility] and the plan is that she will return when medically stable.

## 2019-09-30 NOTE — PROGRESS NOTES
1550- report received from [facility], patient in bed, family at bedside, denies pain or distress, patient is alert to self only, Lung sound clear but dim, on RA, +  Bowel sounds, skin intact, no pressure Injuries, IV patent, Sinus humphrey on tele- leads and box changed.   fall intervention in  Place, call light in reach,     1620-patient alert, talking to daughter at bedside, HR 60    Patient fed by daughter,  Consumed 100%, patient resting, no distress    1830- resting with eye closed

## 2019-09-30 NOTE — PROGRESS NOTES
1930: Bedside shift change report received by Jamaal Berger (offgoing nurse). Report included the following information SBAR, Kardex, Procedure Summary, Intake/Output, MAR, Recent Results and Cardiac Rhythm SB .     0305: Paged on call physician for Dr. Levon Saldana regarding pt's HRs and BPs. Pt's HR has dropped as low as 24 and periodically drops and sustains in the 30s. Currently in the 46s. BPs are soft. Pt is sleeping but is arousable to voice. Difficult to ascertain mental status changes due to hx of dementia. Pt is pending PPM placement. Awaiting call back. 5707: MD responded to page. Updated to above information. MD verbalized understanding. Orders received, repeated back and verified for a 250 mL fluid bolus. MD states may repeat as needed. 0539: Fluid bolus repeated as pt's BPs are dropping again. 0700: BPs responding well to fluid. 0730: Bedside shift change report given to Taylor (oncoming nurse). Report included the following information SBAR, Kardex, Procedure Summary, Intake/Output, MAR, Recent Results and Cardiac Rhythm SB-SR.     ----------------  Care Plan 2032  Problem: Pressure Injury - Risk of  Goal: *Prevention of pressure injury  Description  Document Enrico Scale and appropriate interventions in the flowsheet. Outcome: Progressing Towards Goal  Note:   Pressure Injury Interventions:  Sensory Interventions: Keep linens dry and wrinkle-free, Minimize linen layers, Pad between skin to skin, Pressure redistribution bed/mattress (bed type), Turn and reposition approx. every two hours (pillows and wedges if needed)    Moisture Interventions: Limit adult briefs, Minimize layers, Maintain skin hydration (lotion/cream)    Activity Interventions: Increase time out of bed, Pressure redistribution bed/mattress(bed type), PT/OT evaluation    Mobility Interventions: HOB 30 degrees or less, Pressure redistribution bed/mattress (bed type), PT/OT evaluation, Turn and reposition approx.  every two hours(pillow and wedges)    Nutrition Interventions: Document food/fluid/supplement intake    Friction and Shear Interventions: Lift sheet, Minimize layers                Problem: Falls - Risk of  Goal: *Absence of Falls  Description  Document Jessica Fall Risk and appropriate interventions in the flowsheet.   Outcome: Progressing Towards Goal  Note:   Fall Risk Interventions:  Mobility Interventions: Communicate number of staff needed for ambulation/transfer, OT consult for ADLs, Patient to call before getting OOB, PT Consult for mobility concerns    Mentation Interventions: Bed/chair exit alarm, Adequate sleep, hydration, pain control, Door open when patient unattended, More frequent rounding, Room close to nurse's station, Update white board    Medication Interventions: Evaluate medications/consider consulting pharmacy, Patient to call before getting OOB, Teach patient to arise slowly    Elimination Interventions: Call light in reach, Bed/chair exit alarm, Patient to call for help with toileting needs, Stay With Me (per policy), Toileting schedule/hourly rounds

## 2019-09-30 NOTE — PROGRESS NOTES
Hospitalist Progress Note  Eder Gautam MD  Answering service: [phone] from in house phone      Date of Service:  2019  NAME:  Akua Hanson  :  3/8/1933  MRN:  [id number]      Admission Summary:   Mrs Palomo Best is an 79 y/o AAF with MHx of significant for heart failure, HTM ,DM, stroke and severe dementia presents to the hospital with c/o dizziness and unresponsiveness this morning. Patient is completely dependent on her ADL/IDL and bed bound on mechanical soft diet. She is a resident of [facility]. Patient is a poor historian due to dementia therefore all the information was obtained from ED report, daughter and nursing home report. Apparently patient was found unresponsive this am and was noted to have heart rate in the 40's with EKG showing junctional bradyarrhythmia during EMS arrival. Per staff at the nursing home patient was having lethargy, fatigue and dizziness last one day. Her medication include amlodipine which was given this morning. On arrival to ER, patient was awake. During my interview she was able to tell that she is in a hospital.     Interval history / Subjective:      Patient seen and examined this afternoon. Daughter at bedside.  She has no new complaint except feeling sore at left chest area     Assessment & Plan:     # Syncope due to cardiac origin  # AV Junctional bradyarrhythmia/SSS s/p PPM placement   - troponin negative   - keep K~4, MG~2   - hold amlodipine   - hold Plavix for surgery- re-initiate tomorrow   - s/p permanent pacemaker placement   - Cardiology input appt        # HTN: ct with Lisinopril   # DM: accucheck and SSI   # Heart failure: well compensated- ct with Lasix   # Severe dementia: stable      DVT prop: Heparin   Code: DNR     Dispo: d/c tomorrow     Baseline functional status: bed bound      Hospital Problems  Date Reviewed: 2017          Codes Class Noted POA    AV junctional bradycardia ICD-10-CM: R00.1  ICD-9-CM: 427.89  9/28/2019 Unknown                Review of Systems:   Not possible since patient has dementia     Vital Signs:    Last 24hrs VS reviewed since prior progress note. Most recent are:  Visit Vitals  /87 (BP 1 Location: Right arm, BP Patient Position: At rest)   Pulse 77   Temp 97.3 °F (36.3 °C)   Resp 18   Ht 5' 3\" (1.6 m)   Wt 87.1 kg (192 lb 0.3 oz)   SpO2 97%   Breastfeeding? No   BMI 34.01 kg/m²         Intake/Output Summary (Last 24 hours) at 9/30/2019 1637  Last data filed at 9/30/2019 0830  Gross per 24 hour   Intake 490 ml   Output    Net 490 ml        Physical Examination:             Constitutional:  No acute distress,   ENT:  Oral mucous moist, oropharynx benign. Neck supple,    Resp:  left arm on a sling, CTA bilaterally. No wheezing/rhonchi/rales. No accessory muscle use   CV:  Regular rhythm, normal rate, no murmurs, gallops, rubs    GI:  Soft, non distended, non tender. normoactive bowel sounds, no hepatosplenomegaly     Musculoskeletal:  No edema, warm, 2+ pulses throughout    Neurologic:  CN II-XII reviewed            Data Review:   I personally reviewed labs and imaging     Labs:     Recent Labs     09/30/19  0259 09/28/19  1419   WBC 7.9 8.9   HGB 10.4* 11.3*   HCT 34.5* 36.5    311     Recent Labs     09/30/19  0259 09/28/19  1522    141   K 4.0 4.0   * 108   CO2 22 27   BUN 11 11   CREA 0.62 0.71   GLU 76 100   CA 8.2* 8.9   MG  --  1.8     Recent Labs     09/28/19  1522   SGOT 12*   ALT 12      TBILI 0.2   TP 7.4   ALB 3.2*   GLOB 4.2*     Recent Labs     09/28/19  1419   INR 1.0   PTP 10.4      No results for input(s): FE, TIBC, PSAT, FERR in the last 72 hours. No results found for: FOL, RBCF   No results for input(s): PH, PCO2, PO2 in the last 72 hours.   Recent Labs     09/28/19  1640   TROIQ <0.05     Lab Results   Component Value Date/Time    Cholesterol, total 142 01/28/2015 03:59 AM    HDL Cholesterol 54 01/28/2015 03:59 AM    LDL, calculated 76.4 01/28/2015 03:59 AM    Triglyceride 58 01/28/2015 03:59 AM    CHOL/HDL Ratio 2.6 01/28/2015 03:59 AM     Lab Results   Component Value Date/Time    Glucose (POC) 97 09/28/2019 02:40 PM    Glucose (POC) 253 (H) 04/28/2017 11:57 AM    Glucose (POC) 273 (H) 04/28/2017 06:34 AM    Glucose (POC) 251 (H) 04/28/2017 12:38 AM    Glucose (POC) 257 (H) 04/27/2017 06:19 PM     Lab Results   Component Value Date/Time    Color YELLOW/STRAW 09/28/2019 03:11 PM    Appearance CLEAR 09/28/2019 03:11 PM    Specific gravity 1.014 09/28/2019 03:11 PM    pH (UA) 6.5 09/28/2019 03:11 PM    Protein 30 (A) 09/28/2019 03:11 PM    Glucose NEGATIVE  09/28/2019 03:11 PM    Ketone NEGATIVE  09/28/2019 03:11 PM    Bilirubin NEGATIVE  09/28/2019 03:11 PM    Urobilinogen 0.2 09/28/2019 03:11 PM    Nitrites NEGATIVE  09/28/2019 03:11 PM    Leukocyte Esterase NEGATIVE  09/28/2019 03:11 PM    Epithelial cells FEW 09/28/2019 03:11 PM    Bacteria NEGATIVE  09/28/2019 03:11 PM    WBC 0-4 09/28/2019 03:11 PM    RBC 0-5 09/28/2019 03:11 PM         Medications Reviewed:     Current Facility-Administered Medications   Medication Dose Route Frequency    sodium chloride (NS) flush 5-40 mL  5-40 mL IntraVENous Q8H    sodium chloride (NS) flush 5-40 mL  5-40 mL IntraVENous PRN    [START ON 10/1/2019] ciprofloxacin HCl (CIPRO) tablet 250 mg  250 mg Oral Q12H    sodium chloride (NS) flush 5-40 mL  5-40 mL IntraVENous Q8H    sodium chloride (NS) flush 5-40 mL  5-40 mL IntraVENous PRN    acetaminophen (TYLENOL) tablet 650 mg  650 mg Oral Q4H PRN    gabapentin (NEURONTIN) capsule 300 mg  300 mg Oral QHS    levothyroxine (SYNTHROID) tablet 100 mcg  100 mcg Oral ACB    magnesium oxide (MAG-OX) tablet 400 mg  400 mg Oral DAILY    polyethylene glycol (MIRALAX) packet 17 g  17 g Oral DAILY    carboxymethylcellulose sodium (REFRESH PLUS) 0.5 % ophthalmic solution 2 Drop  2 Drop Both Eyes BID    levETIRAcetam (KEPPRA) tablet 250 mg  250 mg Oral BID    influenza vaccine 2019-20 (6 mos+)(PF) (FLUARIX/FLULAVAL/FLUZONE QUAD) injection 0.5 mL  0.5 mL IntraMUSCular PRIOR TO DISCHARGE     ______________________________________________________________________  EXPECTED LENGTH OF STAY: - - -  ACTUAL LENGTH OF STAY:          2                 Nayana Hutchinson MD   Patient has given Verbal permission to discuss medical care with   persons present in the room and and also with contact as listed on face sheet.

## 2019-09-30 NOTE — PROGRESS NOTES
1730: Bedside shift change report given to Taylor SERVIN/Emilia Coreas (Student Nurse) (oncoming nurse) by Sharif Davis (offgoing nurse). Report included the following information SBAR, Kardex, MAR and Recent Results. 1101: Patient left from the floor to go to cath lab    1340: Patient is not coming back to this unit from cath lab, attempted to call report to 3N    1420: Attempted to call report to 3N    [phone]:  Attempted to call report to 3N

## 2019-09-30 NOTE — PROGRESS NOTES
Spiritual Care Assessment/Progress Note  [facility]      NAME: Niecy Price      MRN: [id number]  AGE: 80 y.o. SEX: female  Taoist Affiliation: Presbyterian   Language: English     9/30/2019     Total Time (in minutes): 5     Spiritual Assessment begun in [facility] 4 CV SERVICES UNIT through conversation with:         []Patient        [] Family    [] Friend(s)        Reason for Consult: Initial/Spiritual assessment, patient floor     Spiritual beliefs: (Please include comment if needed)     [] Identifies with a tabby tradition:         [] Supported by a tabby community:            [] Claims no spiritual orientation:           [] Seeking spiritual identity:                [] Adheres to an individual form of spirituality:           [x] Not able to assess:                           Identified resources for coping:      [] Prayer                               [] Music                  [] Guided Imagery     [] Family/friends                 [] Pet visits     [] Devotional reading                         [x] Unknown     [] Other:                                               Interventions offered during this visit: (See comments for more details)                Plan of Care:     [] Support spiritual and/or cultural needs    [] Support AMD and/or advance care planning process      [] Support grieving process   [] Coordinate Rites and/or Rituals    [] Coordination with community clergy   [] No spiritual needs identified at this time   [] Detailed Plan of Care below (See Comments)  [] Make referral to Music Therapy  [] Make referral to Pet Therapy     [] Make referral to Addiction services  [] Make referral to [facility]  [] Make referral to Spiritual Care Partner  [] No future visits requested        [x] Follow up visits as needed     Comments:  visit for initial spiritual assessment. Patient out of room for procedure at the time of this visit.   Will continue to follow up as needed and upon request as able. Visited by Rev. Stephanie Lopes MDiv, [facility], [facility] paging service: [phone])

## 2019-09-30 NOTE — PROGRESS NOTES
Transitions of Care:     -Patient anticipated to return to [facility] [address] patient, will have to go through [facility] for transportation, coordination, etc.     The CM left a voicemail for [address] [place] [address] 190 Worker, Virgil Arroyo, requesting a call back in order to discuss transitions of care. The CM also spoke with Rodolfo, Liaison with [facility], and do not anticipate any barriers regarding ability to accept patient back to [facility] so long as [facility] continues to authorize that level of care. NEAL Cline     10:44 a.m.- CM left additional voicemail for Radha Chavez,  Jannette Agarwal ([phone]). NEAL Cline    11:56 a.m.- The CM spoke with Virgil Arroyo, SW with [facility] confirmed that [facility] pays for LTC at [facility] verbalized patient can return to [facility] in [facility] with [address] asked Radha Chavez for DEBBIE to fax over correct insurance card information. Patient will require BLS transport to [facility]- transportation will be established by Jannette Agarwal. CM to call Virgil Arroyo upon discharge. CM inquired about AMD- Radha Chavez endorses that the patient revoked previous AMD, both children are decision makers.  NEAL Cline

## 2019-09-30 NOTE — PROGRESS NOTES
TRANSFER - IN REPORT:    Verbal report received from BRYCE Kenney on Nikko Scott, Procedure Pacemaker Implant , from the Cardiac Cath lab, for routine progression of care. Report consisted of patients Situation, Background, Assessment and Recommendations(SBAR). Information from the following report(s) Procedure Summary, MAR and Recent Results was reviewed with the receiving clinician. Opportunity for questions and clarification was provided. Assessment completed upon patients arrival to [address] [place] [address] and care assumed. Cardiac Cath Lab Recovery Arrival Note:     Nikko Scott arrived to [facility] recovery area. Patient procedure= . Patient on cardiac monitor, non-invasive blood pressure, Patient status doing well without problems. Patient is A&Ox 4. Patient reports no chest pain, no n/v. Procedure site without any bleeding and no hematoma.

## 2019-09-30 NOTE — PROGRESS NOTES
Cardiac Cath Lab Procedure Area Arrival Note:    Nicole Sotomayor arrived to Cardiac Cath Lab, Procedure Area. Patient identifiers verified with NAME and DATE OF BIRTH. Procedure verified with patient. Consent forms verified. Allergies verified. Patient informed of procedure and plan of care. Questions answered with review. Patient voiced understanding of procedure and plan of care. Patient on cardiac monitor, non-invasive blood pressure, SPO2 monitor. On RA and placed on O2 @ 2 lpm via NC.  IV of NSS on pump at 25 ml/hr. Patient status doing well without problems. Patient is A&Ox 1. Patient reports no chest pain or shortness of breath. Patient medicated during procedure with orders obtained and verified by Dr. Cristina Laguna. Refer to patients Cardiac Cath Lab PROCEDURE REPORT for vital signs, assessment, status, and response during procedure, printed at end of case. Printed report on chart or scanned into chart. 13:21-Transfer to [facility] from Procedure Area    Verbal report given to Aubrey Be on Nicole Sotomayor being transferred to Cardiac Cath Lab RR for routine progression of care   Patient is post PPI procedure. Patient stable upon transfer to . Report consisted of patients Situation, Background, Assessment and   Recommendations(SBAR). Information from the following report(s) SBAR and Procedure Summary was reviewed with the receiving nurse. Opportunity for questions and clarification was provided. Patient medicated during procedure with orders obtained and verified by Dr. Cristina Laguna. Refer to patient PROCEDURE REPORT for vital signs, assessment, status, and response during procedure.

## 2019-10-01 PROCEDURE — 77030039046 HC PAD DEFIB RADIOTRNSPNT CNMD -B: Performed by: INTERNAL MEDICINE

## 2019-10-01 PROCEDURE — 74011250636 HC RX REV CODE- 250/636: Performed by: INTERNAL MEDICINE

## 2019-10-01 PROCEDURE — 02WA3MZ REVISION OF CARDIAC LEAD IN HEART, PERCUTANEOUS APPROACH: ICD-10-PCS | Performed by: INTERNAL MEDICINE

## 2019-10-01 PROCEDURE — 33215 REPOSITION PACING-DEFIB LEAD: CPT | Performed by: INTERNAL MEDICINE

## 2019-10-01 PROCEDURE — 77030031139 HC SUT VCRL2 J&J -A: Performed by: INTERNAL MEDICINE

## 2019-10-01 PROCEDURE — 65660000000 HC RM CCU STEPDOWN

## 2019-10-01 PROCEDURE — 99152 MOD SED SAME PHYS/QHP 5/>YRS: CPT | Performed by: INTERNAL MEDICINE

## 2019-10-01 PROCEDURE — 77030019580 HC CBL PACE MEDT -B: Performed by: INTERNAL MEDICINE

## 2019-10-01 PROCEDURE — 74011250637 HC RX REV CODE- 250/637: Performed by: INTERNAL MEDICINE

## 2019-10-01 PROCEDURE — 77030018673: Performed by: INTERNAL MEDICINE

## 2019-10-01 PROCEDURE — 77030012935 HC DRSG AQUACEL BMS -B: Performed by: INTERNAL MEDICINE

## 2019-10-01 PROCEDURE — 99153 MOD SED SAME PHYS/QHP EA: CPT | Performed by: INTERNAL MEDICINE

## 2019-10-01 PROCEDURE — 74011000272 HC RX REV CODE- 272: Performed by: INTERNAL MEDICINE

## 2019-10-01 PROCEDURE — 74011000250 HC RX REV CODE- 250: Performed by: INTERNAL MEDICINE

## 2019-10-01 RX ORDER — LIDOCAINE HYDROCHLORIDE AND EPINEPHRINE 10; 10 MG/ML; UG/ML
INJECTION, SOLUTION INFILTRATION; PERINEURAL AS NEEDED
Status: DISCONTINUED | OUTPATIENT
Start: 2019-10-01 | End: 2019-10-01 | Stop reason: HOSPADM

## 2019-10-01 RX ORDER — SODIUM CHLORIDE 0.9 % (FLUSH) 0.9 %
5-40 SYRINGE (ML) INJECTION AS NEEDED
Status: DISCONTINUED | OUTPATIENT
Start: 2019-10-01 | End: 2019-10-02 | Stop reason: HOSPADM

## 2019-10-01 RX ORDER — SODIUM CHLORIDE 0.9 % (FLUSH) 0.9 %
5-40 SYRINGE (ML) INJECTION AS NEEDED
Status: DISCONTINUED | OUTPATIENT
Start: 2019-10-01 | End: 2019-10-01 | Stop reason: HOSPADM

## 2019-10-01 RX ORDER — FENTANYL CITRATE 50 UG/ML
INJECTION, SOLUTION INTRAMUSCULAR; INTRAVENOUS AS NEEDED
Status: DISCONTINUED | OUTPATIENT
Start: 2019-10-01 | End: 2019-10-01 | Stop reason: HOSPADM

## 2019-10-01 RX ORDER — SODIUM CHLORIDE 0.9 % (FLUSH) 0.9 %
5-40 SYRINGE (ML) INJECTION EVERY 8 HOURS
Status: DISCONTINUED | OUTPATIENT
Start: 2019-10-01 | End: 2019-10-01 | Stop reason: HOSPADM

## 2019-10-01 RX ORDER — SODIUM CHLORIDE 0.9 % (FLUSH) 0.9 %
5-40 SYRINGE (ML) INJECTION EVERY 8 HOURS
Status: DISCONTINUED | OUTPATIENT
Start: 2019-10-01 | End: 2019-10-02 | Stop reason: HOSPADM

## 2019-10-01 RX ORDER — VANCOMYCIN/0.9 % SOD CHLORIDE 1.5G/250ML
1.5 PLASTIC BAG, INJECTION (ML) INTRAVENOUS ONCE
Status: COMPLETED | OUTPATIENT
Start: 2019-10-01 | End: 2019-10-01

## 2019-10-01 RX ADMIN — Medication 10 ML: at 06:00

## 2019-10-01 RX ADMIN — LEVETIRACETAM 250 MG: 250 TABLET ORAL at 22:11

## 2019-10-01 RX ADMIN — CIPROFLOXACIN 250 MG: 250 TABLET, FILM COATED ORAL at 22:11

## 2019-10-01 RX ADMIN — LEVETIRACETAM 250 MG: 250 TABLET ORAL at 09:10

## 2019-10-01 RX ADMIN — Medication 10 ML: at 22:21

## 2019-10-01 RX ADMIN — POLYETHYLENE GLYCOL 3350 17 G: 17 POWDER, FOR SOLUTION ORAL at 08:51

## 2019-10-01 RX ADMIN — ACETAMINOPHEN 650 MG: 325 TABLET, FILM COATED ORAL at 09:23

## 2019-10-01 RX ADMIN — LEVOTHYROXINE SODIUM 100 MCG: 100 TABLET ORAL at 06:52

## 2019-10-01 RX ADMIN — CARBOXYMETHYLCELLULOSE SODIUM 2 DROP: 5 SOLUTION/ DROPS OPHTHALMIC at 09:00

## 2019-10-01 RX ADMIN — CARBOXYMETHYLCELLULOSE SODIUM 2 DROP: 5 SOLUTION/ DROPS OPHTHALMIC at 22:11

## 2019-10-01 RX ADMIN — CIPROFLOXACIN 250 MG: 250 TABLET, FILM COATED ORAL at 09:10

## 2019-10-01 RX ADMIN — MAGNESIUM OXIDE TAB 400 MG (241.3 MG ELEMENTAL MG) 400 MG: 400 (241.3 MG) TAB at 08:51

## 2019-10-01 RX ADMIN — GABAPENTIN 300 MG: 300 CAPSULE ORAL at 22:11

## 2019-10-01 NOTE — PROGRESS NOTES
Her ventricular lead moved overnight and needs to be repositioned.  Her atrial lead P wave went way down and may also need to be revisied

## 2019-10-01 NOTE — PROGRESS NOTES
TRANSFER - IN REPORT:    Verbal report received from [facility] on Sonali Person  being received from procedure for routine progression of care. Report consisted of patients Situation, Background, Assessment and Recommendations(SBAR). Information from the following report(s) Procedure Summary, Intake/Output, MAR, Recent Results and Med Rec Status was reviewed with the receiving clinician. Opportunity for questions and clarification was provided. Assessment completed upon patients arrival to [address] [place] [address] and care assumed. Cardiac Cath Lab Recovery Arrival Note:    Sonali Person arrived to [facility] recovery area. Patient procedure= lead revision V lead. Patient on cardiac monitor, non-invasive blood pressure, SPO2 monitor. On room air . IV  of vancomycin on pump at 1258 ml/hr. Patient status doing well without problems. Patient is A&O to name and . Patient reports no complaints. PROCEDURE SITE CHECK:    Procedure site:without any bleeding and or hematoma, ice bag applied, no  pain/discomfort reported at procedure site. No change in patient status. Continue to monitor patient and status.     Sling to to left arm

## 2019-10-01 NOTE — CDMP QUERY
Pt admitted for bradycardia/SSS and has had pacemaker inserted. History states she has a history of severe dementia and is complete care for all ADL's and bed bound. Nurses notes state she has contracted and weak extremities. Please further specify if you are treating/managing any of the following 
 
=> Functional quadriplegia 
=> Generalized weakness 
=> Other explanation 
=> Clinically unable to determine. Risk factors: previous stroke, elderly, bed bound Clinical Indicators: H and P states that patient is complete care for all ADL's and bed bound. Nursing assessments document same with complete care for all ADL's and contractures and weakness of all 4 extremities. Treatment: supportive care with complete assistance for all care from staff and family Please clarify and document your clinical opinion in the progress notes and discharge summary including the definitive and/or presumptive  diagnosis, (suspected or probable), related to the above clinical findings. Please include clinical findings supporting your diagnosis. Thank you for your time. Jaspal Lopez RN, BSN 
[phone]

## 2019-10-01 NOTE — PROGRESS NOTES
Assessment and documentation was a dual by Harsha Fang RN and Cj Gordon RN. I agree with documentation and assessment performed.

## 2019-10-01 NOTE — ADT AUTH CERT NOTES
Manuel Chin (9/29/2019) by Oriana García RN  
 
   
Review Entered Review Status 10/1/2019 08:04 In Primary  
   
Criteria Review REVIEW SUMMARY 
  
Patient: TIMOTHY WYATT Review Number: [id number] Review Status: In Primary 
  
Condition Specific: Yes 
  
Condition Level Of Care Code: ACUTE Condition Level Of Care Description: Acute 
  
  
OUTCOMES Outcome Type: Primary 
  
  
  
REVIEW DETAILS 
  
Service Date: 09/29/2019 Admit Date: 09/28/2019 Product: Klaudia Tomlin Adult Subset: Arrhythmia 
    (Symptom or finding within 24h) 
  (Excludes PO medications unless noted) Select Day, One: 
            [ ] Episode Day 2, One: 
                [ ] INTERMEDIATE, One: 
                    [ ] Partial Responder, not clinically stable for discharge and requires continued stay, >= One: 
                        [ ] Bradycardia, junctional rhythm or atrioventricular block, Both: 
                            [X] Continuous cardiac monitoring (excludes Holter) 
                            ~--Admin, IQ Admin Admin on 10- 08:04 AM--~ 
                            per attending  
                             
                            syncope d/t cardiac origin  
                            av junctional bradyarrhythmia /SSS  
                            trop neg  
                            keep k 4 mag 2  
                            hold amlopidine  
                            hold plavix for surgery tomorrow  
                            family agreed for pm plan tomorrow  
                            npo after midnite  
                            cardio following  
                             
                            htn cont lisionopril  
                            dm accuchecks ssi  
                            cont lasix  
                            severe dementia ~--Admin,  Pin Troy Jose on 10- 08:01 AM--~ 
                            tele  
                            narrow complex junctional rhythm  
                            pts sons and daughter wish to proceed with a PPM  
                             hr 38-64  
                            97.9 40 156/45 16 100% ra  
                            88.3 kg 
                             
                             
                             
  
Version: [employer]® 2018.1 Janae Chowdhury  © 2018 [employer] and/or one of its [place]. All Rights Reserved. CPT only © 2017 [employer]. All Rights Reserved.

## 2019-10-01 NOTE — PROGRESS NOTES
TRANSFER - OUT REPORT:    Verbal report given to Shad Kirby RN(name) on Alberta Ojeda  being transferred to [address] [facility](unit) for routine progression of care       Report consisted of patients Situation, Background, Assessment and   Recommendations(SBAR). Information from the following report(s) Procedure Summary, Intake/Output, MAR, Recent Results, Med Rec Status and Cardiac Rhythm paced was reviewed with the receiving nurse. Lines:   Peripheral IV 09/28/19 Right;Upper Arm (Active)   Site Assessment Clean, dry, & intact 10/1/2019  9:00 AM   Phlebitis Assessment 0 10/1/2019  9:00 AM   Infiltration Assessment 0 10/1/2019  9:00 AM   Dressing Status Clean, dry, & intact 10/1/2019  9:00 AM   Dressing Type Transparent;Tape 10/1/2019  9:00 AM   Hub Color/Line Status Blue;Capped 10/1/2019  9:00 AM   Action Taken Open ports on tubing capped 10/1/2019  9:00 AM   Alcohol Cap Used Yes 10/1/2019  9:00 AM       Peripheral IV 09/30/19 Left;Upper (Active)   Site Assessment Clean, dry, & intact 10/1/2019  9:00 AM   Phlebitis Assessment 0 10/1/2019  9:00 AM   Infiltration Assessment 0 10/1/2019  9:00 AM   Dressing Status Clean, dry, & intact 10/1/2019  9:00 AM   Dressing Type Transparent;Tape 10/1/2019  9:00 AM   Hub Color/Line Status Blue;Capped 10/1/2019  9:00 AM   Action Taken Open ports on tubing capped 9/30/2019  8:18 PM   Alcohol Cap Used Yes 10/1/2019  4:35 AM        Opportunity for questions and clarification was provided.       Patient transported with:   Tech     [phone]  Transferred via bed to room 362 with pt transporter

## 2019-10-01 NOTE — ROUTINE PROCESS
Bedside shift change report given to [address] [place] [address] (oncoming nurse) by Lila Cardozo RN (offgoing nurse). Report included the following information SBAR, Kardex and Cardiac Rhythm AV paced.

## 2019-10-01 NOTE — PROGRESS NOTES
Cardiac Cath Lab Procedure Area Arrival Note:    Royer Hernandez arrived to Cardiac Cath Lab, Procedure Area. Patient identifiers verified with NAME and DATE OF BIRTH. Procedure verified with patient. Consent forms verified. Allergies verified. Patient informed of procedure and plan of care. Questions answered with review. Patient voiced understanding of procedure and plan of care. Patient on cardiac monitor, non-invasive blood pressure, SPO2 monitor. On room air . Placed on   O2 @ 2 lpm via nasal cannula. IV of NS on pump at 25 ml/hr. Patient status doing well without problems. Patient is A&Ox 4. Patient reports no pain. Patient medicated during procedure with orders obtained and verified by Dr. Joe Schmitt. Refer to patients Cardiac Cath Lab PROCEDURE REPORT for vital signs, assessment, status, and response during procedure, printed at end of case. Printed report on chart or scanned into chart.

## 2019-10-01 NOTE — PROGRESS NOTES
Transfer to [address] [place] [address] from Procedure Area    Verbal report given to  Shahram Bradshaw RN on Kelly Ghotra being transferred to Cardiac Cath Lab  for routine post - op   Patient is post Pacemaker lead reposition procedure. Patient stable upon transfer to . Report consisted of patients Situation, Background, Assessment and   Recommendations(SBAR). Information from the following report(s) Procedure Summary, Intake/Output, MAR and Cardiac Rhythm Paced was reviewed with the receiving nurse. Opportunity for questions and clarification was provided. Patient medicated during procedure with orders obtained and verified by Dr. Mariam Tripp. Refer to patient PROCEDURE REPORT for vital signs, assessment, status, and response during procedure.

## 2019-10-01 NOTE — PROGRESS NOTES
Bedside and Verbal shift change report given to Soo Castellanos (oncoming nurse) by Sheri Muniz RN (offgoing nurse).  Report included the following information SBAR, Kardex, Intake/Output, MAR and Recent Results.

## 2019-10-01 NOTE — PROGRESS NOTES
Hospitalist Progress Note  Margarette Cortes MD  Answering service: [phone] from in house phone      Date of Service:  10/1/2019  NAME:  Nikko Scott  :  3/8/1933  MRN:  [id number]      Admission Summary:   Mrs Kulwant Cameron is an 79 y/o AAF with MHx of significant for heart failure, HTM ,DM, stroke and severe dementia presents to the hospital with c/o dizziness and unresponsiveness this morning. Patient is completely dependent on her ADL/IDL and bed bound on mechanical soft diet. She is a resident of [facility]. Patient is a poor historian due to dementia therefore all the information was obtained from ED report, daughter and nursing home report. Apparently patient was found unresponsive this am and was noted to have heart rate in the 40's with EKG showing junctional bradyarrhythmia during EMS arrival. Per staff at the nursing home patient was having lethargy, fatigue and dizziness last one day. Her medication include amlodipine which was given this morning. On arrival to ER, patient was awake. During my interview she was able to tell that she is in a hospital.     Interval history / Subjective:      Patient seen and examined . Daughter at bedside.    She has no new complaint except feeling sore at gum area   Ate broth this am     Assessment & Plan:     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   - troponin negative   - keep K~4, MG~2   - hold amlodipine   - hold Plavix for surgery- re-initiate tomorrow   - s/p permanent pacemaker placement  and needs leads replaced today 10/1 by Dr Dawson Carrasco     HTN: ct with Lisinopril     DM: accucheck and SSI      Heart failure: well compensated-   ct with Lasix     Seizures    keppra     Hypothyroidism   synthroid        Severe dementia: stable      DVT prop: Heparin , on hold for PCM   Code: DNR     Dispo: [facility] when cleared by cards     Baseline functional status: bed bound      Hospital Problems  Date Reviewed: 4/28/2017          Codes Class Noted POA    AV junctional bradycardia ICD-10-CM: R00.1  ICD-9-CM: 427.89  9/28/2019 Unknown                Review of Systems:   Not possible since patient has dementia     Vital Signs:    Last 24hrs VS reviewed since prior progress note. Most recent are:  Visit Vitals  /64 (BP 1 Location: Right arm, BP Patient Position: At rest)   Pulse 60   Temp 97.7 °F (36.5 °C)   Resp 17   Ht 5' 3\" (1.6 m)   Wt 87.4 kg (192 lb 10.9 oz)   SpO2 98%   Breastfeeding? No   BMI 34.13 kg/m²         Intake/Output Summary (Last 24 hours) at 10/1/2019 0926  Last data filed at 9/30/2019 1735  Gross per 24 hour   Intake 20 ml   Output    Net 20 ml        Physical Examination:             Constitutional:  No acute distress,   ENT:  Oral mucous moist, oropharynx benign. Neck supple,    Resp:  left arm on a sling, CTA bilaterally. No wheezing/rhonchi/rales. No accessory muscle use   CV:  Regular rhythm, normal rate, no murmurs, gallops, rubs    GI:  Soft, non distended, non tender. normoactive bowel sounds, no hepatosplenomegaly     Musculoskeletal:  No edema, warm, 2+ pulses throughout    Neurologic:  CN II-XII reviewed            Data Review:   I personally reviewed labs and imaging     Labs:     Recent Labs     09/30/19  0259 09/28/19  1419   WBC 7.9 8.9   HGB 10.4* 11.3*   HCT 34.5* 36.5    311     Recent Labs     09/30/19  0259 09/28/19  1522    141   K 4.0 4.0   * 108   CO2 22 27   BUN 11 11   CREA 0.62 0.71   GLU 76 100   CA 8.2* 8.9   MG  --  1.8     Recent Labs     09/28/19  1522   SGOT 12*   ALT 12      TBILI 0.2   TP 7.4   ALB 3.2*   GLOB 4.2*     Recent Labs     09/28/19  1419   INR 1.0   PTP 10.4      No results for input(s): FE, TIBC, PSAT, FERR in the last 72 hours. No results found for: FOL, RBCF   No results for input(s): PH, PCO2, PO2 in the last 72 hours.   Recent Labs     09/28/19  Carri ALLEN <0.05     Lab Results   Component Value Date/Time    Cholesterol, total 142 01/28/2015 03:59 AM    HDL Cholesterol 54 01/28/2015 03:59 AM    LDL, calculated 76.4 01/28/2015 03:59 AM    Triglyceride 58 01/28/2015 03:59 AM    CHOL/HDL Ratio 2.6 01/28/2015 03:59 AM     Lab Results   Component Value Date/Time    Glucose (POC) 97 09/28/2019 02:40 PM    Glucose (POC) 253 (H) 04/28/2017 11:57 AM    Glucose (POC) 273 (H) 04/28/2017 06:34 AM    Glucose (POC) 251 (H) 04/28/2017 12:38 AM    Glucose (POC) 257 (H) 04/27/2017 06:19 PM     Lab Results   Component Value Date/Time    Color YELLOW/STRAW 09/28/2019 03:11 PM    Appearance CLEAR 09/28/2019 03:11 PM    Specific gravity 1.014 09/28/2019 03:11 PM    pH (UA) 6.5 09/28/2019 03:11 PM    Protein 30 (A) 09/28/2019 03:11 PM    Glucose NEGATIVE  09/28/2019 03:11 PM    Ketone NEGATIVE  09/28/2019 03:11 PM    Bilirubin NEGATIVE  09/28/2019 03:11 PM    Urobilinogen 0.2 09/28/2019 03:11 PM    Nitrites NEGATIVE  09/28/2019 03:11 PM    Leukocyte Esterase NEGATIVE  09/28/2019 03:11 PM    Epithelial cells FEW 09/28/2019 03:11 PM    Bacteria NEGATIVE  09/28/2019 03:11 PM    WBC 0-4 09/28/2019 03:11 PM    RBC 0-5 09/28/2019 03:11 PM         Medications Reviewed:     Current Facility-Administered Medications   Medication Dose Route Frequency    sodium chloride (NS) flush 5-40 mL  5-40 mL IntraVENous Q8H    sodium chloride (NS) flush 5-40 mL  5-40 mL IntraVENous PRN    ciprofloxacin HCl (CIPRO) tablet 250 mg  250 mg Oral Q12H    sodium chloride (NS) flush 5-40 mL  5-40 mL IntraVENous Q8H    sodium chloride (NS) flush 5-40 mL  5-40 mL IntraVENous PRN    acetaminophen (TYLENOL) tablet 650 mg  650 mg Oral Q4H PRN    gabapentin (NEURONTIN) capsule 300 mg  300 mg Oral QHS    levothyroxine (SYNTHROID) tablet 100 mcg  100 mcg Oral ACB    magnesium oxide (MAG-OX) tablet 400 mg  400 mg Oral DAILY    polyethylene glycol (MIRALAX) packet 17 g  17 g Oral DAILY    carboxymethylcellulose sodium (REFRESH PLUS) 0.5 % ophthalmic solution 2 Drop  2 Drop Both Eyes BID    levETIRAcetam (KEPPRA) tablet 250 mg  250 mg Oral BID     ______________________________________________________________________  EXPECTED LENGTH OF STAY: - - -  ACTUAL LENGTH OF STAY:          Dellar Bence, MD   Patient has given Verbal permission to discuss medical care with   persons present in the room and and also with contact as listed on face sheet.

## 2019-10-02 VITALS
HEART RATE: 64 BPM | SYSTOLIC BLOOD PRESSURE: 176 MMHG | TEMPERATURE: 98 F | OXYGEN SATURATION: 99 % | RESPIRATION RATE: 14 BRPM | HEIGHT: 63 IN | BODY MASS INDEX: 35.55 KG/M2 | WEIGHT: 200.62 LBS | DIASTOLIC BLOOD PRESSURE: 86 MMHG

## 2019-10-02 PROCEDURE — 74011250637 HC RX REV CODE- 250/637: Performed by: INTERNAL MEDICINE

## 2019-10-02 RX ORDER — CIPROFLOXACIN 250 MG/1
250 TABLET, FILM COATED ORAL 2 TIMES DAILY
Qty: 14 TAB | Refills: 0 | Status: SHIPPED
Start: 2019-10-02 | End: 2019-10-09

## 2019-10-02 RX ORDER — LEVETIRACETAM 250 MG/1
250 TABLET ORAL 2 TIMES DAILY
Qty: 90 TAB | Refills: 0 | Status: SHIPPED
Start: 2019-10-02

## 2019-10-02 RX ADMIN — Medication 10 ML: at 06:41

## 2019-10-02 RX ADMIN — CARBOXYMETHYLCELLULOSE SODIUM 2 DROP: 5 SOLUTION/ DROPS OPHTHALMIC at 08:56

## 2019-10-02 RX ADMIN — POLYETHYLENE GLYCOL 3350 17 G: 17 POWDER, FOR SOLUTION ORAL at 08:51

## 2019-10-02 RX ADMIN — MAGNESIUM OXIDE TAB 400 MG (241.3 MG ELEMENTAL MG) 400 MG: 400 (241.3 MG) TAB at 08:52

## 2019-10-02 RX ADMIN — CIPROFLOXACIN 250 MG: 250 TABLET, FILM COATED ORAL at 08:56

## 2019-10-02 RX ADMIN — Medication 10 ML: at 13:05

## 2019-10-02 RX ADMIN — LEVOTHYROXINE SODIUM 100 MCG: 100 TABLET ORAL at 06:36

## 2019-10-02 RX ADMIN — Medication 10 ML: at 13:06

## 2019-10-02 RX ADMIN — LEVETIRACETAM 250 MG: 250 TABLET ORAL at 13:02

## 2019-10-02 NOTE — PROGRESS NOTES
She looks good and wound is clean with dressing on. On monitoring pace is working fine, Will interrogate the pacer this am. She could be discharged on Ciprofloxacin 250 mg BID for one week. She could be seen at our pacer clinic in one week and see me in one month.

## 2019-10-02 NOTE — ROUTINE PROCESS
Bedside and Verbal shift change report given to [employer] (oncoming nurse) by Gisella Cardozo (offgoing nurse). Report included the following information SBAR, Kardex, Recent Results and Cardiac Rhythm AV pace. Oleg Kramer

## 2019-10-02 NOTE — PROGRESS NOTES
Problem: Pressure Injury - Risk of  Goal: *Prevention of pressure injury  Description  Document Enrico Scale and appropriate interventions in the flowsheet. Outcome: Progressing Towards Goal  Note:   Pressure Injury Interventions:  Sensory Interventions: Assess changes in LOC, Check visual cues for pain, Minimize linen layers, Turn and reposition approx. every two hours (pillows and wedges if needed)    Moisture Interventions: Internal/External urinary devices, Check for incontinence Q2 hours and as needed, Maintain skin hydration (lotion/cream), Minimize layers    Activity Interventions: Pressure redistribution bed/mattress(bed type), PT/OT evaluation    Mobility Interventions: Pressure redistribution bed/mattress (bed type)    Nutrition Interventions: Offer support with meals,snacks and hydration, Document food/fluid/supplement intake    Friction and Shear Interventions: Lift sheet, Apply protective barrier, creams and emollients                Problem: Falls - Risk of  Goal: *Absence of Falls  Description  Document Jessica Fall Risk and appropriate interventions in the flowsheet.   Outcome: Progressing Towards Goal  Note:   Fall Risk Interventions:  Mobility Interventions: PT Consult for mobility concerns    Mentation Interventions: Bed/chair exit alarm    Medication Interventions: Bed/chair exit alarm    Elimination Interventions: Call light in reach              Problem: Pacer/ICD: Discharge Outcomes  Goal: *Hemodynamically stable  Outcome: Progressing Towards Goal  Goal: *Stable cardiac rhythm  Outcome: Progressing Towards Goal  Goal: *Lungs clear or at baseline  Outcome: Progressing Towards Goal  Goal: *No signs and symptoms of infection or wound complications  Outcome: Progressing Towards Goal     Problem: Heart Failure: Day 1  Goal: *Oxygen saturation within defined limits  Outcome: Progressing Towards Goal  Goal: *Hemodynamically stable  Outcome: Progressing Towards Goal  Goal: *Optimal pain control at patient's stated goal  Outcome: Progressing Towards Goal

## 2019-10-02 NOTE — DISCHARGE SUMMARY
Discharge Summary       PATIENT ID: Aracelis Aquino  MRN: [id number]   YOB: 1933    DATE OF ADMISSION: 9/28/2019  1:51 PM    DATE OF DISCHARGE:    PRIMARY CARE PROVIDER: Unknown, Provider     ATTENDING PHYSICIAN:   DISCHARGING PROVIDER: John Olvera MD    To contact this individual call [phone] and ask the  to page. If unavailable ask to be transferred the Adult Hospitalist Department. CONSULTATIONS: IP CONSULT TO CARDIOLOGY  IP CONSULT TO HOSPITALIST    PROCEDURES/SURGERIES: Procedure(s):  LEAD REPOSITION    ADMITTING [facility] COURSE:     Mrs Power Casas is an 81 y/o AAF with MHx of significant for heart failure, HTM ,DM, stroke and severe dementia presents to the hospital with c/o dizziness and unresponsiveness this morning. Patient is completely dependent on her ADL/IDL and bed bound on mechanical soft diet. She is a resident of [facility]. Patient is a poor historian due to dementia therefore all the information was obtained from ED report, daughter and nursing home report. Apparently patient was found unresponsive this am and was noted to have heart rate in the 40's with EKG showing junctional bradyarrhythmia during EMS arrival. Per staff at the nursing home patient was having lethargy, fatigue and dizziness last one day. Her medication include amlodipine which was given this morning. On arrival to ER, patient was awake. During my interview she was able to tell that she is in a hospital.     No results found for this or any previous visit (from the past 24 hour(s)).            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 9/30  - troponin negative   - keep K~4, MG~2   - hold amlodipine   - hold Plavix for surgery-restarted on discharge   - s/p permanent pacemaker placement 9/30 and needs leads [id number] by Dr Jaycee Laguna       HTN: ct with Lisinopril      DM: accucheck and SSI       Heart failure: well compensated-   ct with Lasix      Seizures keppra      Hypothyroidism   synthroid          Severe dementia: stable          DISCHARGE DIAGNOSES / PLAN:      Above      ADDITIONAL CARE RECOMMENDATIONS:        PENDING TEST RESULTS:   At the time of discharge the following test results are still pending:     FOLLOW UP APPOINTMENTS:    Follow-up Information     Follow up With Specialties Details Why Alf Tellez MD Cardiology In 1 week  [facility]  [facility]  [phone]               DIET: Cardiac Diet      ACTIVITY: Activity as tolerated    WOUND CARE:     EQUIPMENT needed:       DISCHARGE MEDICATIONS:  Current Discharge Medication List      START taking these medications    Details   levETIRAcetam (KEPPRA) 250 mg tablet Take 1 Tab by mouth two (2) times a day. Qty: 90 Tab, Refills: 0      ciprofloxacin HCl (CIPRO) 250 mg tablet Take 1 Tab by mouth two (2) times a day for 7 days. Qty: 14 Tab, Refills: 0         CONTINUE these medications which have NOT CHANGED    Details   albuterol (ACCUNEB) 1.25 mg/3 mL nebu 1.25 mg by Nebulization route every four (4) hours as needed. LORazepam (ATIVAN) 2 mg/mL injection 1 mg by IntraVENous route DIALYSIS PRN (seizure). omeprazole (PRILOSEC) 20 mg capsule Take 20 mg by mouth daily. senna-docusate (SENNA LAXATIVE-STOOL SOFTENER) 8.6-50 mg per tablet Take 1 Tab by mouth nightly.      magnesium oxide (MAG-OX) 400 mg tablet Take 400 mg by mouth daily. gabapentin (NEURONTIN) 300 mg capsule Take 300 mg by mouth nightly. polyethylene glycol (MIRALAX) 17 gram packet Take 17 g by mouth daily. Mix in 8 oz of water, juice, soda, coffee, or tea prior to administration      polyvinyl alcohol (LIQUIFILM TEARS) 1.4 % ophthalmic solution Administer 2 Drops to both eyes ACB/HS. acetaminophen (TYLENOL) 500 mg tablet Take 500 mg by mouth every eight (8) hours as needed for Pain.  Patient received afternoon dose at [facility]      levothyroxine (SYNTHROID) 100 mcg tablet Take 100 mcg by mouth Daily (before breakfast). amLODIPine (NORVASC) 10 mg tablet Take 10 mg by mouth nightly. clopidogrel (PLAVIX) 75 mg tablet Take 75 mg by mouth daily. STOP taking these medications       levETIRAcetam (KEPPRA XR) 500 mg ER tablet Comments:   Reason for Stopping:                 NOTIFY YOUR PHYSICIAN FOR ANY OF THE FOLLOWING:   Fever over 101 degrees for 24 hours. Chest pain, shortness of breath, fever, chills, nausea, vomiting, diarrhea, change in mentation, falling, weakness, bleeding. Severe pain or pain not relieved by medications. Or, any other signs or symptoms that you may have questions about. DISPOSITION:  x  Home With:   OT  PT  HH  RN       Long term SNF/Inpatient Rehab    Independent/assisted living    Hospice    Other:       PATIENT CONDITION AT DISCHARGE:     Functional status    Poor     Deconditioned    x Independent      Cognition    x Lucid     Forgetful     Dementia      Catheters/lines (plus indication)    Lua     PICC     PEG    x None      Code status   x  Full code     DNR      PHYSICAL EXAMINATION AT DISCHARGE:  General:          Alert,  And confused and slow to respond   HEENT:           Atraumatic, anicteric sclerae, pink conjunctivae                          No oral ulcers, mucosa moist, throat clear, dentition fair  Neck:               Supple, symmetrical  Lungs:             Clear to auscultation bilaterally. No Wheezing or Rhonchi. No rales. Chest wall:      No tenderness left arm in sling   Heart:              Regular  rhythm,  No  murmur   No edema  Abdomen:        Soft, non-tender. Not distended. Bowel sounds normal  Extremities:     mild swelling                           Skin turgor normal, Capillary refill normal  Skin:                Not pale. Not Jaundiced  No rashes   Psych:             Not anxious or agitated. Neurologic:      Alert,but oriented .       CHRONIC MEDICAL DIAGNOSES:  Problem List as of 10/2/2019 Date Reviewed: 4/28/2017 Codes Class Noted - Resolved    AV junctional bradycardia ICD-10-CM: R00.1  ICD-9-CM: 427.89  9/28/2019 - Present        Shortness of breath ICD-10-CM: R06.02  ICD-9-CM: 786.05  4/27/2017 - Present        Delirium ICD-10-CM: R41.0  ICD-9-CM: 780.09  4/27/2017 - Present        Debility ICD-10-CM: R53.81  ICD-9-CM: 799.3  4/27/2017 - Present        Goals of care, counseling/discussion ICD-10-CM: Z71.89  ICD-9-CM: V65.49  4/27/2017 - Present        Acute delirium ICD-10-CM: R41.0  ICD-9-CM: 780.09  4/22/2017 - Present              Greater than  30  minutes were spent with the patient on counseling and coordination of care    Signed:   Terri Allison MD  10/2/2019  12:35 PM

## 2019-10-02 NOTE — PROGRESS NOTES
EDWIN; Plan for discharge today to return to [address] [place] [address] [address] received notification from CM Coordination and Hospilaist that pt will discharge today. EKATERINA called Joyce April, SW for [facility], [phone]. VM left on her phone. Kathy Newman, RN ACM CRM    CM talked to [place]. She set up transportation [facility] [address] [facility] [facility]. They will call the unit. EKATERINA  callled [facility] and talked to [facility] in Admissions, [phone] . CM faxed discharge summary, H&, MARs, and Kardex to [employer] [phone]. Nurse to call report to [phone].

## 2019-10-02 NOTE — DISCHARGE INSTRUCTIONS
DISCHARGE SUMMARY from Nurse    PATIENT INSTRUCTIONS:    After general anesthesia or intravenous sedation, for 24 hours or while taking prescription Narcotics:  · Limit your activities  · Do not drive and operate hazardous machinery  · Do not make important personal or business decisions  · Do  not drink alcoholic beverages  · If you have not urinated within 8 hours after discharge, please contact your surgeon on call. Report the following to your surgeon:  · Excessive pain, swelling, redness or odor of or around the surgical area  · Temperature over 100.5  · Nausea and vomiting lasting longer than 4 hours or if unable to take medications  · Any signs of decreased circulation or nerve impairment to extremity: change in color, persistent  numbness, tingling, coldness or increase pain  · Any questions    What to do at Home:    *  Please give a list of your current medications to your Primary Care Provider. *  Please update this list whenever your medications are discontinued, doses are      changed, or new medications (including over-the-counter products) are added. *  Please carry medication information at all times in case of emergency situations. These are general instructions for a healthy lifestyle:    No smoking/ No tobacco products/ Avoid exposure to second hand smoke  Surgeon General's Warning:  Quitting smoking now greatly reduces serious risk to your health. Obesity, smoking, and sedentary lifestyle greatly increases your risk for illness    A healthy diet, regular physical exercise & weight monitoring are important for maintaining a healthy lifestyle    You may be retaining fluid if you have a history of heart failure or if you experience any of the following symptoms:  Weight gain of 3 pounds or more overnight or 5 pounds in a week, increased swelling in our hands or feet or shortness of breath while lying flat in bed.   Please call your doctor as soon as you notice any of these symptoms; do not wait until your next office visit. MyChart Activation    Thank you for requesting access to [employer]. Please follow the instructions below to securely access and download your online medical record. [employer] allows you to send messages to your doctor, view your test results, renew your prescriptions, schedule appointments, and more. How Do I Sign Up? 1. In your internet browser, go to [email or web address]  2. Click on the First Time User? Click Here link in the Sign In box. You will be redirect to the New Member Sign Up page. 3. Enter your [employer] Access Code exactly as it appears below. You will not need to use this code after youve completed the sign-up process. If you do not sign up before the expiration date, you must request a new code. [employer] Access Code: [email or web address]  Expires: 2019  1:52 PM (This is the date your [employer] access code will )    4. Enter the last four digits of your Social Security Number (xxxx) and Date of Birth (mm/dd/yyyy) as indicated and click Submit. You will be taken to the next sign-up page. 5. Create a [employer] ID. This will be your [employer] login ID and cannot be changed, so think of one that is secure and easy to remember. 6. Create a [employer] password. You can change your password at any time. 7. Enter your Password Reset Question and Answer. This can be used at a later time if you forget your password. 8. Enter your e-mail address. You will receive e-mail notification when new information is available in [address]. 9. Click Sign Up. You can now view and download portions of your medical record. 10. Click the Download Summary menu link to download a portable copy of your medical information. Additional Information    If you have questions, please visit the Frequently Asked Questions section of the [employer] website at [email or web address] Remember, [employer] is NOT to be used for urgent needs.  For medical emergencies, dial 911.      The discharge information has been reviewed with the patient. The patient verbalized understanding. Discharge medications reviewed with the patient and appropriate educational materials and side effects teaching were provided. ___________________________________________________________________________________________________________________________________        PATIENT INSTRUCTIONS POST-PACEMAKER IMPLANT    1. No heavy lifting or exercises with the left arm for 2 weeks. This includes the following:  Do not raise arm above the shoulder level to comb hair, pull on clothes, etc... Do not use the affected arm to pull up or push up from a seated or laying  down position. Do not allow anyone else to pull on the affected arm. 2.  Do not shower for 7 days after discharge from the hospital.  Sponge baths are O.K., provided the pacemaker site is kept dry and the  affected arm is not raised and movement is limited. 3.  Please do not remove the dressing. 4.  Do not drive for 1 week. 5.  Call Dr. Dawson Carrasco [phone] if you experience any of the following symptoms:  1. Redness at the pacemaker site  2. Swelling at or around the pacemaker or in the left arm  3. Pain around the pacemaker  4. Dizziness, lightheadedness, fainting spells  5. Lack of energy  6. Shortness of breath  7. Rapid heart rate  8. Chest or muscle twitches    6. Follow-up with Dr. Dawson Carrasco  in 7 days.         Medications to take at home: Ciprofloxacin 250 mg tablet; take one tablet twice a day for one week    DATE: __________  Physician: ______________________________           Discharge Instructions       PATIENT ID: Nikko Scott  MRN: [id number]   YOB: 1933    DATE OF ADMISSION: 9/28/2019  1:51 PM    DATE OF DISCHARGE: 10/2/2019    PRIMARY CARE PROVIDER: Unknown, Provider     ATTENDING PHYSICIAN: Vipul Quiles MD  DISCHARGING PROVIDER: Margarette Cortes MD    To contact this individual call [phone] and ask the  to page.   If unavailable ask to be transferred the Adult Hospitalist Department. DISCHARGE DIAGNOSES   SSS    CONSULTATIONS: IP CONSULT TO CARDIOLOGY  IP CONSULT TO HOSPITALIST    PROCEDURES/SURGERIES: Procedure(s):  LEAD REPOSITION    PENDING TEST RESULTS:   At the time of discharge the following test results are still pending:     FOLLOW UP APPOINTMENTS:   Follow-up Information     Follow up With Specialties Details Why Alf Tellez MD Cardiology In 1 week  [address]  [phone]             ADDITIONAL CARE RECOMMENDATIONS:   Please follow up with SUMAN Manzo    DIET: Cardiac Diet  Oral Nutritional Supplements:    ACTIVITY: Activity as tolerated    WOUND CARE:     EQUIPMENT needed:       DISCHARGE MEDICATIONS:   See Medication Reconciliation Form    · It is important that you take the medication exactly as they are prescribed. · Keep your medication in the bottles provided by the pharmacist and keep a list of the medication names, dosages, and times to be taken in your wallet. · Do not take other medications without consulting your doctor. NOTIFY YOUR PHYSICIAN FOR ANY OF THE FOLLOWING:   Fever over 101 degrees for 24 hours. Chest pain, shortness of breath, fever, chills, nausea, vomiting, diarrhea, change in mentation, falling, weakness, bleeding. Severe pain or pain not relieved by medications. Or, any other signs or symptoms that you may have questions about.       DISPOSITION:  x  Home With:   OT  PT  HH  RN       SNF/Inpatient Rehab/LTAC    Independent/assisted living    Hospice    Other:     CDMP Checked:   Yes x     PROBLEM LIST Updated:  Yes x       Signed:   Rolando Wilson MD  10/2/2019  12:34 PM

## 2025-04-09 NOTE — PROGRESS NOTES
[facility]  Pt RODOLFO to surgical unit. PCP: Abbe Carter PA-C     Last appt:  8/7/2024      Future Appointments   Date Time Provider Department Center   5/14/2025  2:30 PM Abbe Carter PA-C [facility]   10/13/2025  8:00 AM [facility]          Requested Prescriptions     Pending Prescriptions Disp Refills    DULERA 100-5 MCG/ACT inhaler [Pharmacy Med Name: DULERA 100 MCG-5 MCG INHALER] 13 each 11     Sig: INHALE 2 PUFFS INTO THE LUNGS TWICE A DAY

## (undated) DEVICE — SLING ORTHOPEDIC PCH UNIV 19.5X9 IN 2-39 IN ARM W/ FOAM STRP

## (undated) DEVICE — KIT INTRO 7FR L14CM DIA0.038IN PEEL AWAY DI-LOCK DIL CLOSE

## (undated) DEVICE — PENCIL ES L3M BTTN SWCH S STL HEX LOK BLDE ELECTRD HOLSTER

## (undated) DEVICE — DRAPE SURG W25XL50IN E OPN CIR BND BG

## (undated) DEVICE — 3M™ IOBAN™ 2 ANTIMICROBIAL INCISE DRAPE 6640EZ: Brand: IOBAN™ 2

## (undated) DEVICE — REM POLYHESIVE ADULT PATIENT RETURN ELECTRODE: Brand: VALLEYLAB

## (undated) DEVICE — KENDALL RADIOLUCENT FOAM MONITORING ELECTRODE RECTANGULAR SHAPE: Brand: KENDALL

## (undated) DEVICE — Device: Brand: PADPRO

## (undated) DEVICE — SUTURE COAT VCRL SZ 4-0 L18IN ABSRB UD L19MM PS-2 1/2 CIR J496G

## (undated) DEVICE — BULB SYRINGE, IRRIGATION WITH PROTECTIVE CAP, 60 CC, INDIVIDUALLY WRAPPED: Brand: DOVER

## (undated) DEVICE — CABLE PACE ALGTR CLP SAF 12FT --

## (undated) DEVICE — SUTURE VCRL SZ 3-0 L27IN ABSRB VLT L26MM SH 1/2 CIR J316H

## (undated) DEVICE — PACEMAKER PACK: Brand: MEDLINE INDUSTRIES, INC.

## (undated) DEVICE — DRESSING HEMSTAT W4XL4IN 4 PLY WHT IMPREG KAOLIN HYDRPHLC

## (undated) DEVICE — KIT INTRO 9FR L14CM DIA0.038IN PEEL AWAY DI-LOCK DIL CLOSE

## (undated) DEVICE — DRSG AQUACEL SURG 3.5X6IN -- CONVERT TO ITEM 369227

## (undated) DEVICE — SUTURE COAT VCRL PC 5 PRECIS COSM CONVENTIONAL CUT PRIM 3 8 J823H

## (undated) DEVICE — CTRL URINE TOX S2E X100 --

## (undated) DEVICE — 3M™ IOBAN™ 2 ANTIMICROBIAL INCISE DRAPE 6650EZ: Brand: IOBAN™ 2

## (undated) DEVICE — SUT SLK 0 30IN CT1 BLK --